# Patient Record
Sex: MALE | Race: WHITE | Employment: FULL TIME | URBAN - METROPOLITAN AREA
[De-identification: names, ages, dates, MRNs, and addresses within clinical notes are randomized per-mention and may not be internally consistent; named-entity substitution may affect disease eponyms.]

---

## 2019-05-06 ENCOUNTER — OFFICE VISIT (OUTPATIENT)
Dept: FAMILY MEDICINE CLINIC | Age: 59
End: 2019-05-06
Payer: COMMERCIAL

## 2019-05-06 VITALS
SYSTOLIC BLOOD PRESSURE: 152 MMHG | BODY MASS INDEX: 37.37 KG/M2 | OXYGEN SATURATION: 94 % | HEIGHT: 73 IN | DIASTOLIC BLOOD PRESSURE: 98 MMHG | HEART RATE: 74 BPM | RESPIRATION RATE: 16 BRPM | TEMPERATURE: 98 F | WEIGHT: 282 LBS

## 2019-05-06 DIAGNOSIS — M70.22 OLECRANON BURSITIS OF LEFT ELBOW: Primary | ICD-10-CM

## 2019-05-06 PROCEDURE — 96372 THER/PROPH/DIAG INJ SC/IM: CPT | Performed by: NURSE PRACTITIONER

## 2019-05-06 PROCEDURE — 99202 OFFICE O/P NEW SF 15 MIN: CPT | Performed by: NURSE PRACTITIONER

## 2019-05-06 RX ORDER — TRIAMCINOLONE ACETONIDE 40 MG/ML
40 INJECTION, SUSPENSION INTRA-ARTICULAR; INTRAMUSCULAR ONCE
Status: COMPLETED | OUTPATIENT
Start: 2019-05-06 | End: 2019-05-06

## 2019-05-06 RX ORDER — PREDNISONE 20 MG/1
40 TABLET ORAL DAILY
Qty: 10 TABLET | Refills: 0 | Status: SHIPPED | OUTPATIENT
Start: 2019-05-06 | End: 2019-05-11

## 2019-05-06 RX ORDER — LISINOPRIL 10 MG/1
TABLET ORAL
COMMUNITY
Start: 2017-02-08 | End: 2021-04-20 | Stop reason: SDUPTHER

## 2019-05-06 RX ORDER — ATORVASTATIN CALCIUM 20 MG/1
TABLET, FILM COATED ORAL
Status: ON HOLD | COMMUNITY
Start: 2017-02-08 | End: 2021-04-20 | Stop reason: HOSPADM

## 2019-05-06 RX ADMIN — TRIAMCINOLONE ACETONIDE 40 MG: 40 INJECTION, SUSPENSION INTRA-ARTICULAR; INTRAMUSCULAR at 11:05

## 2019-05-06 NOTE — PROGRESS NOTES
After obtaining consent, and per orders of [facility] CNP, injection of kenalog 40 given in Left upper quad. gluteus by Ilya Shore. Patient instructed to remain in clinic for 20 minutes afterwards, and to report any adverse reaction to me immediately.

## 2019-05-06 NOTE — PATIENT INSTRUCTIONS
Patient Education        Bursitis: Care Instructions  Your Care Instructions    A bursa is a small sac of fluid that helps the tissues around a joint slide over one another easily. Injury or overuse of a joint can cause pain, redness, and inflammation in the bursa (bursitis). Bursitis usually gets better if you avoid the activity that caused it. You can help prevent bursitis from coming back by doing stretching and strengthening exercises. You may also need to change the way you do some activities. Follow-up care is a key part of your treatment and safety. Be sure to make and go to all appointments, and call your doctor if you are having problems. It's also a good idea to know your test results and keep a list of the medicines you take. How can you care for yourself at home? · Put ice or a cold pack on the area for 10 to 20 minutes at a time. Try to do this every 1 to 2 hours for the next 3 days (when you are awake) or until the swelling goes down. Put a thin cloth between the ice and your skin. · After the 3 days of using ice, you may use heat on the area. You can use a hot water bottle; a warm, moist towel; or a heating pad set on low. You can also try alternating heat and ice. · Rest the area where you have pain. Stop any activities that cause pain. Switch to activities that do not stress the area. · Take pain medicines exactly as directed. ? If the doctor gave you a prescription medicine for pain, take it as prescribed. ? If you are not taking a prescription pain medicine, ask your doctor if you can take an over-the-counter medicine. ? Do not take two or more pain medicines at the same time unless the doctor told you to. Many pain medicines have acetaminophen, which is Tylenol. Too much acetaminophen (Tylenol) can be harmful. · To prevent stiffness, gently move the joint as much as you can without pain every day. As the pain gets better, keep doing range-of-motion exercises.  Ask your doctor for exercises that will make the muscles around the joint stronger. Do these as directed. · You can slowly return to the activity that caused the pain, but do it with less effort until you can do it without pain or swelling. Be sure to warm up before and stretch after you do the activity. When should you call for help? Call your doctor now or seek immediate medical care if:    · You have new or worse symptoms of infection, such as:  ? Increased pain, swelling, warmth, or redness. ? Red streaks leading from the area. ? Pus draining from the area. ? A fever.    Watch closely for changes in your health, and be sure to contact your doctor if:    · You do not get better as expected. Where can you learn more? Go to https://VT Silicon.[employer]. org and sign in to your AdultSpace account. Enter P613 in the Yesware box to learn more about \"Bursitis: Care Instructions. \"     If you do not have an account, please click on the \"Sign Up Now\" link. Current as of: September 20, 2018  Content Version: 11.9  © 4563-5379 [employer]. Care instructions adapted under license by [facility] ([facility]). If you have questions about a medical condition or this instruction, always ask your healthcare professional. [address] any warranty or liability for your use of this information. Patient Education        Bursitis of the Elbow: Care Instructions  Your Care Instructions  Bursitis is pain and swelling of the bursae. These are sacs of fluid that help your joints move smoothly. Olecranon bursitis is a type of bursitis that affects the back of the elbow. This is sometimes called Nicanor elbow because the bump that develops looks like the cartoon character Nicanor's elbow. Injury, overuse, or prolonged pressure on your elbow can cause this form of bursitis. Sometimes it happens when people have arthritis. It also can occur for unknown reasons.   Treatment may include draining fluid from the bursa with a needle. If your doctor thought there was infection, he or she may have prescribed antibiotics. You also may get shots of medicine into the bursa to help the swelling go down. Your elbow should get better in a few days or weeks. Follow-up care is a key part of your treatment and safety. Be sure to make and go to all appointments, and call your doctor if you are having problems. It's also a good idea to know your test results and keep a list of the medicines you take. How can you care for yourself at home? · Take pain medicines exactly as directed. ? If the doctor gave you a prescription medicine for pain, take it as prescribed. ? If you are not taking a prescription pain medicine, ask your doctor if you can take an over-the-counter medicine. ? Do not take two or more pain medicines at the same time unless the doctor told you to. Many pain medicines have acetaminophen, which is Tylenol. Too much acetaminophen (Tylenol) can be harmful. · If your doctor prescribed antibiotics, take them as directed. Do not stop taking them just because you feel better. You need to take the full course of antibiotics. · If your doctor gave you a sling, an elastic bandage, or a compression sleeve, wear it exactly as instructed. · Put ice or a cold pack on your elbow for 10 to 20 minutes at a time. Try to do this every 1 to 2 hours for the next 3 days (when you are awake) or until the swelling goes down. Put a thin cloth between the ice and your skin. · After 3 days, you can try heat, or alternate heat and ice. · Rest your elbow. Try to stop or reduce any activity that causes pain. · Wear elbow pads during physical activity to prevent injury. · Do not lean your elbows on tables or armrests. When should you call for help? Call your doctor now or seek immediate medical care if:    · You have new or worse symptoms of infection, such as:  ? Increased pain, swelling, warmth, or redness.   ? Red streaks leading from the area. ? Pus draining from the area. ? A fever.    Watch closely for changes in your health, and be sure to contact your doctor if:    · You do not get better as expected. Where can you learn more? Go to https://chpepapitoeb.Rivalfox. org and sign in to your FÃƒÂ©vrier 46hart account. Enter  in the [facility] box to learn more about \"Bursitis of the Elbow: Care Instructions. \"     If you do not have an account, please click on the \"Sign Up Now\" link. Current as of: September 20, 2018  Content Version: 11.9  © 1318-3977 [employer]. Care instructions adapted under license by [facility] ([facility]). If you have questions about a medical condition or this instruction, always ask your healthcare professional. [address] any warranty or liability for your use of this information.

## 2019-05-06 NOTE — PROGRESS NOTES
[address] [facility]   [address] [facility]  [address] [facility] [address]  Dept: [phone]  Dept Fax: [phone]    Tadeo Martin is a 59 y.o. male who presents today forhis medical conditions/complaints as noted below. Tadeo Martin is c/o of   Chief Complaint   Patient presents with    Joint Swelling     Woke up Saturday AM with L elbow swelling/mass. Very painful and lack of ROM. Took tylenol and used ice and swelling went down. This AM woke up and was swollen again. Hit elbow 5 days ago but did not relate symptoms per patient.  Hypertension     BP elevated today. Pt states did not take BP meds this AM.      HPI:     Arm Pain    The incident occurred 3 to 5 days ago (L elbow x's 4 days ). The incident occurred at home. The injury mechanism was a direct blow (Hit elbow on file cabinet and developed swelling x's 5 days later ). The pain is present in the left elbow. The quality of the pain is described as shooting. The pain does not radiate. The pain is at a severity of 8/10. The pain is mild. The pain has been fluctuating since the incident. Pertinent negatives include no chest pain, muscle weakness, numbness or tingling. The symptoms are aggravated by movement, palpation and lifting. He has tried acetaminophen and ice for the symptoms. The treatment provided mild relief. Past Medical History:   Diagnosis Date    CAD (coronary artery disease)     Hyperlipidemia     Hypertension       Past Surgical History:   Procedure Laterality Date    APPENDECTOMY      CARDIAC SURGERY      4 stents placed in 2002    TONSILLECTOMY      as a child       History reviewed. No pertinent family history.     Social History     Tobacco Use    Smoking status: Never Smoker    Smokeless tobacco: Never Used   Substance Use Topics    Alcohol use: Not Currently      Current Outpatient Medications   Medication Sig Dispense Refill    atorvastatin (LIPITOR) 20 MG tablet TK 1 T PO QD      lisinopril (PRINIVIL;ZESTRIL) 10 MG tablet TK 1 T PO DAILY      metoprolol tartrate (LOPRESSOR) 25 MG tablet TK ONE-HALF T BID PO      predniSONE (DELTASONE) 20 MG tablet Take 2 tablets by mouth daily for 5 days 10 tablet 0     No current facility-administered medications for this visit. No Known Allergies        Subjective:      Review of Systems   Constitutional: Negative for appetite change, chills, fatigue and fever. HENT: Negative for congestion, postnasal drip and sore throat. Eyes: Negative. Negative for discharge and itching. Respiratory: Negative for cough, chest tightness and shortness of breath. Cardiovascular: Negative for chest pain, palpitations and leg swelling. Gastrointestinal: Negative for abdominal pain, diarrhea, nausea and vomiting. Endocrine: Negative. Genitourinary: Negative for dysuria, frequency and urgency. Musculoskeletal: Positive for arthralgias and joint swelling. Negative for neck pain and neck stiffness. L elbow    Skin: Negative for rash. Allergic/Immunologic: Negative. Neurological: Negative for dizziness, tingling, weakness, light-headedness, numbness and headaches. Hematological: Negative for adenopathy. Psychiatric/Behavioral: Negative for confusion. Objective:      Physical Exam   Constitutional: He is oriented to person, place, and time. Vital signs are normal. He appears well-developed and well-nourished. HENT:   Head: Normocephalic. Right Ear: External ear normal.   Left Ear: External ear normal.   Nose: Nose normal.   Mouth/Throat: Oropharynx is clear and moist.   Eyes: Pupils are equal, round, and reactive to light. Conjunctivae and EOM are normal.   Neck: Normal range of motion. Neck supple. Cardiovascular: Normal rate, regular rhythm and normal heart sounds. Exam reveals no gallop and no friction rub. No murmur heard. Pulmonary/Chest: Effort normal and breath sounds normal. No respiratory distress. Abdominal: Soft. Bowel sounds are normal.   Musculoskeletal: Normal range of motion. Left forearm: He exhibits tenderness and swelling. Moderate swelling noted to L elbow. Tenderness with palpation    Lymphadenopathy:     He has no cervical adenopathy. Neurological: He is alert and oriented to person, place, and time. He has normal reflexes. No cranial nerve deficit. Coordination normal.   Skin: Skin is warm and dry. No rash noted. Psychiatric: He has a normal mood and affect. Thought content normal.     BP (!) 152/98 (Site: Right Upper Arm, Position: Sitting, Cuff Size: Large Adult)   Pulse 74   Temp 98 °F (36.7 °C) (Tympanic)   Resp 16   Ht 6' 1\" (1.854 m)   Wt 282 lb (127.9 kg)   SpO2 94%   BMI 37.21 kg/m²     Assessment:       Diagnosis Orders   1. Olecranon bursitis of left elbow  triamcinolone acetonide (KENALOG-40) injection 40 mg    predniSONE (DELTASONE) 20 MG tablet             Plan:     1.) In office Kenalog IM injection   2.) Start Prednisone   3.) May use Ibuprofen and/or Tylenol PRN pain and swelling   4.) Compression PRN for comfort and swelling   5.) Apply a compressive ACE bandage. Rest and elevate the affected painful area. Apply cold compresses intermittently as needed. As pain recedes, begin normal activities slowly as tolerated. Call if symptoms persist.   6.) Follow-up with PCP     Problem List     None           Patient given educationalmaterials - see patient instructions. Discussed use, benefit, and side effectsof prescribed medications. All patient questions answered. Pt verbalized understanding. Instructed to continue current medications, diet and exercise. Patient agreedwith treatment plan. Follow up as directed.      Electronically signed by NICKI Lyn CNP on 5/8/2019 at 3:52 PM

## 2019-05-08 ASSESSMENT — ENCOUNTER SYMPTOMS
SHORTNESS OF BREATH: 0
COUGH: 0
DIARRHEA: 0
EYES NEGATIVE: 1
EYE ITCHING: 0
ALLERGIC/IMMUNOLOGIC NEGATIVE: 1
NAUSEA: 0
EYE DISCHARGE: 0
CHEST TIGHTNESS: 0
SORE THROAT: 0
VOMITING: 0
ABDOMINAL PAIN: 0

## 2020-11-09 ENCOUNTER — OFFICE VISIT (OUTPATIENT)
Dept: PRIMARY CARE CLINIC | Age: 60
End: 2020-11-09
Payer: COMMERCIAL

## 2020-11-09 ENCOUNTER — HOSPITAL ENCOUNTER (OUTPATIENT)
Age: 60
Setting detail: SPECIMEN
Discharge: HOME | End: 2020-11-09
Payer: COMMERCIAL

## 2020-11-09 VITALS — OXYGEN SATURATION: 93 % | HEART RATE: 87 BPM | TEMPERATURE: 98.6 F

## 2020-11-09 PROCEDURE — 99213 OFFICE O/P EST LOW 20 MIN: CPT | Performed by: NURSE PRACTITIONER

## 2020-11-09 ASSESSMENT — PATIENT HEALTH QUESTIONNAIRE - PHQ9
2. FEELING DOWN, DEPRESSED OR HOPELESS: 0
1. LITTLE INTEREST OR PLEASURE IN DOING THINGS: 0
SUM OF ALL RESPONSES TO PHQ9 QUESTIONS 1 & 2: 0
SUM OF ALL RESPONSES TO PHQ QUESTIONS 1-9: 0

## 2020-11-09 NOTE — PATIENT INSTRUCTIONS
You were tested for COVID today. We will call you with results when they are available. If you have not heard from us in 7 days, please call our office. Patient was evaluated carside today during this pandemic covid 19 situation. Quarantine as directed  Await results  Increase fluids- stay hydrated  Good handwashing  Supportive care as discussed    If having symptoms- you need to be fever free for 24 hours, other symptoms resolved and at least 10 days passed since first symptom appeared before discontinuing  quarantine- CDC guidelines    tylenol as directed as needed over the counter if able to take  go to the ER for chest pain, short of breath, hard time breathing, persistent vomiting, feeling weaker or dehydrated, any worsening, change or concern  Follow up with primary care for re evaluation  [place] covid 19 call center - [phone]-ASK- [address]  Another good resource for information is coronavirus. [place].gov    If exposure, it is recommended 14 day quarantine

## 2020-11-10 ASSESSMENT — ENCOUNTER SYMPTOMS
VOMITING: 0
WHEEZING: 0
DIARRHEA: 0
COUGH: 1
SHORTNESS OF BREATH: 0

## 2020-11-10 NOTE — PROGRESS NOTES
[address]  [address] [id number]  Dept: [phone]  Dept Fax: [phone]    Cl Gage a 56 y.o. male who presents to the urgent care today for his medical conditions/complaintsas noted below. Luis Fernando Gonzalez is c/o of Concern For COVID-19 (Cough, body aches, chest tightness x 3 days )      HPI:     Cc cough, aches, chest tightness for 3 days  Denies vomiting, diarrhea    Cough   This is a new problem. Episode onset: 3 days. The problem has been waxing and waning. The cough is non-productive. Associated symptoms include myalgias and postnasal drip. Pertinent negatives include no chest pain, fever, shortness of breath or wheezing. Nothing aggravates the symptoms. Past Medical History:   Diagnosis Date    CAD (coronary artery disease)     Hyperlipidemia     Hypertension        Current Outpatient Medications   Medication Sig Dispense Refill    atorvastatin (LIPITOR) 20 MG tablet TK 1 T PO QD      lisinopril (PRINIVIL;ZESTRIL) 10 MG tablet TK 1 T PO DAILY      metoprolol tartrate (LOPRESSOR) 25 MG tablet TK ONE-HALF T BID PO       No current facility-administered medications for this visit. No Known Allergies    Subjective:     Review of Systems   Constitutional: Negative for fever. HENT: Positive for postnasal drip. Respiratory: Positive for cough. Negative for shortness of breath and wheezing. Cardiovascular: Negative for chest pain. Gastrointestinal: Negative for diarrhea and vomiting. Musculoskeletal: Positive for myalgias. All other systems reviewed and are negative. Objective:      Physical Exam  Vitals signs and nursing note reviewed. Constitutional:       General: He is not in acute distress. Appearance: Normal appearance. He is well-developed. He is not ill-appearing, toxic-appearing or diaphoretic. HENT:      Head: Normocephalic and atraumatic.       Nose: Nose normal.      Mouth/Throat: Mouth: Mucous membranes are moist.   Eyes:      General: No scleral icterus. Right eye: No discharge. Left eye: No discharge. Neck:      Musculoskeletal: Normal range of motion and neck supple. No neck rigidity. Cardiovascular:      Rate and Rhythm: Normal rate and regular rhythm. Heart sounds: Normal heart sounds. Pulmonary:      Effort: Pulmonary effort is normal. No respiratory distress. Breath sounds: Normal breath sounds. No stridor. No wheezing, rhonchi or rales. Abdominal:      General: Bowel sounds are normal.      Palpations: Abdomen is soft. Musculoskeletal: Normal range of motion. General: No signs of injury. Skin:     General: Skin is warm and dry. Coloration: Skin is not jaundiced or pale. Findings: No erythema or rash. Neurological:      General: No focal deficit present. Mental Status: He is alert and oriented to person, place, and time. Psychiatric:         Mood and Affect: Mood normal.         Behavior: Behavior normal.       Pulse 87   Temp 98.6 °F (37 °C) (Tympanic)   SpO2 93%     Assessment:          Diagnosis Orders   1. Upper respiratory tract infection, unspecified type  COVID-19 Ambulatory       Plan: You were tested for COVID today. We will call you with results when they are available. If you have not heard from us in 7 days, please call our office. Patient was evaluated carside today during this pandemic covid 19 situation.     Quarantine as directed  Await results  Increase fluids- stay hydrated  Good handwashing  Supportive care as discussed    If having symptoms- you need to be fever free for 24 hours, other symptoms resolved and at least 10 days passed since first symptom appeared before discontinuing  quarantine- CDC guidelines    tylenol as directed as needed over the counter if able to take  go to the ER for chest pain, short of breath, hard time breathing, persistent vomiting, feeling weaker or dehydrated, any worsening, change or concern  Follow up with primary care for re evaluation  [place] covid 19 call center - [phone]-ASK- [facility] (1-)  Another good resource for information is coronavirus. [place].gov    If exposure, it is recommended 14 day quarantine  Return for follow up with primary care in 7 days, worsening, change or concern. Patient given educational materials - see patientinstructions. Discussed use, benefit, and side effects of prescribed medications. All patient questions answered. Pt voiced understanding.     Electronically signed by NICKI Dillon 11/10/2020 at 11:04 AM

## 2020-11-11 LAB — SARS-COV-2, NAA: NOT DETECTED

## 2021-04-19 ENCOUNTER — APPOINTMENT (OUTPATIENT)
Dept: CT IMAGING | Age: 61
DRG: 069 | End: 2021-04-19
Payer: COMMERCIAL

## 2021-04-19 ENCOUNTER — APPOINTMENT (OUTPATIENT)
Dept: GENERAL RADIOLOGY | Age: 61
DRG: 069 | End: 2021-04-19
Payer: COMMERCIAL

## 2021-04-19 ENCOUNTER — HOSPITAL ENCOUNTER (INPATIENT)
Age: 61
LOS: 1 days | Discharge: HOME | DRG: 069 | End: 2021-04-20
Attending: EMERGENCY MEDICINE | Admitting: PSYCHIATRY & NEUROLOGY
Payer: COMMERCIAL

## 2021-04-19 DIAGNOSIS — I63.9 CEREBROVASCULAR ACCIDENT (CVA), UNSPECIFIED MECHANISM (HCC): Primary | ICD-10-CM

## 2021-04-19 LAB
% CKMB: 2.3 % (ref 0–3.5)
ABSOLUTE EOS #: 0.14 K/UL (ref 0–0.44)
ABSOLUTE IMMATURE GRANULOCYTE: 0.05 K/UL (ref 0–0.3)
ABSOLUTE LYMPH #: 3.22 K/UL (ref 1.1–3.7)
ABSOLUTE MONO #: 0.52 K/UL (ref 0.1–1.2)
ACETAMINOPHEN LEVEL: <5 UG/ML (ref 10–30)
ACETAMINOPHEN LEVEL: NORMAL UG/ML
ALLEN TEST: NORMAL
AMPHETAMINE: NORMAL
ANION GAP SERPL CALCULATED.3IONS-SCNC: 12 MMOL/L (ref 9–17)
ANION GAP: 8 MMOL/L (ref 7–16)
BARBITURATES: NORMAL
BASOPHILS # BLD: 1 % (ref 0–2)
BASOPHILS ABSOLUTE: 0.05 K/UL (ref 0–0.2)
BENZODIAZEPINES: NORMAL
BUN BLDV-MCNC: 15 MG/DL (ref 8–23)
BUN/CREAT BLD: ABNORMAL (ref 9–20)
BUPRENORPHINE: NORMAL
CALCIUM SERPL-MCNC: 9.7 MG/DL (ref 8.6–10.4)
CARBOXYHEMOGLOBIN: 2.1 % (ref 0–5)
CHLORIDE BLD-SCNC: 103 MMOL/L (ref 98–107)
CK MB: 3.5 NG/ML
CKMB INTERPRETATION: ABNORMAL
CO2: 24 MMOL/L (ref 20–31)
COCAINE: NORMAL
CREAT SERPL-MCNC: 0.76 MG/DL (ref 0.7–1.2)
DIFFERENTIAL TYPE: ABNORMAL
DRUGS OF ABUSE COMMENT: NORMAL
EOSINOPHILS RELATIVE PERCENT: 2 % (ref 1–4)
ETHANOL PERCENT: <0.01 %
ETHANOL PERCENT: NORMAL %
ETHANOL: <10 MG/DL
ETHANOL: NORMAL MG/DL
FIO2: NORMAL
GFR AFRICAN AMERICAN: >60 ML/MIN
GFR NON-AFRICAN AMERICAN: >60 ML/MIN
GFR NON-AFRICAN AMERICAN: >60 ML/MIN
GFR SERPL CREATININE-BSD FRML MDRD: >60 ML/MIN
GFR SERPL CREATININE-BSD FRML MDRD: ABNORMAL ML/MIN/{1.73_M2}
GFR SERPL CREATININE-BSD FRML MDRD: ABNORMAL ML/MIN/{1.73_M2}
GFR SERPL CREATININE-BSD FRML MDRD: NORMAL ML/MIN/{1.73_M2}
GLUCOSE BLD-MCNC: 113 MG/DL (ref 70–99)
GLUCOSE BLD-MCNC: 115 MG/DL (ref 74–100)
HCO3 VENOUS: 28.9 MMOL/L (ref 22–29)
HCT VFR BLD CALC: 50.7 % (ref 40.7–50.3)
HEMOGLOBIN: 16.7 G/DL (ref 13–17)
IMMATURE GRANULOCYTES: 1 %
INR BLD: 1.1
LYMPHOCYTES # BLD: 40 % (ref 24–43)
MCH RBC QN AUTO: 27.9 PG (ref 25.2–33.5)
MCHC RBC AUTO-ENTMCNC: 32.9 G/DL (ref 28.4–34.8)
MCV RBC AUTO: 84.6 FL (ref 82.6–102.9)
METHADONE: NORMAL
METHAMPHETAMINE: NORMAL
MODE: NORMAL
MONOCYTES # BLD: 6 % (ref 3–12)
MYOGLOBIN: 61 NG/ML (ref 28–72)
NEGATIVE BASE EXCESS, VEN: NORMAL (ref 0–2)
NRBC AUTOMATED: 0 PER 100 WBC
O2 DEVICE/FLOW/%: NORMAL
O2 SAT, VEN: 62 % (ref 60–85)
OPIATES: NORMAL
OXYCODONE: NORMAL
PARTIAL THROMBOPLASTIN TIME: 26.1 SEC (ref 20.5–30.5)
PATIENT TEMP: NORMAL
PCO2, VEN: 45.9 MM HG (ref 41–51)
PDW BLD-RTO: 13.2 % (ref 11.8–14.4)
PH VENOUS: 7.41 (ref 7.32–7.43)
PHENCYCLIDINE: NORMAL
PLATELET # BLD: 232 K/UL (ref 138–453)
PLATELET ESTIMATE: ABNORMAL
PMV BLD AUTO: 10.1 FL (ref 8.1–13.5)
PO2, VEN: 32.1 MM HG (ref 30–50)
POC CHLORIDE: 105 MMOL/L (ref 98–107)
POC CREATININE: 1.11 MG/DL (ref 0.51–1.19)
POC HEMATOCRIT: 53 % (ref 41–53)
POC HEMOGLOBIN: 17.9 G/DL (ref 13.5–17.5)
POC IONIZED CALCIUM: 1.12 MMOL/L (ref 1.15–1.33)
POC LACTIC ACID: 1.51 MMOL/L (ref 0.56–1.39)
POC PCO2 TEMP: NORMAL MM HG
POC PH TEMP: NORMAL
POC PO2 TEMP: NORMAL MM HG
POC POTASSIUM: 3.8 MMOL/L (ref 3.5–4.5)
POC SODIUM: 142 MMOL/L (ref 138–146)
POSITIVE BASE EXCESS, VEN: 3 (ref 0–3)
POTASSIUM SERPL-SCNC: 3.9 MMOL/L (ref 3.7–5.3)
PROTHROMBIN TIME: 11.4 SEC (ref 9.1–12.3)
RBC # BLD: 5.99 M/UL (ref 4.21–5.77)
RBC # BLD: ABNORMAL 10*6/UL
SALICYLATE LEVEL: <1 MG/DL (ref 3–10)
SALICYLATE LEVEL: NORMAL MG/DL
SAMPLE SITE: NORMAL
SEG NEUTROPHILS: 50 % (ref 36–65)
SEGMENTED NEUTROPHILS ABSOLUTE COUNT: 4.09 K/UL (ref 1.5–8.1)
SODIUM BLD-SCNC: 139 MMOL/L (ref 135–144)
THC: NORMAL
TOTAL CK: 149 U/L (ref 39–308)
TOTAL CO2, VENOUS: 30 MMOL/L (ref 23–30)
TOXIC TRICYCLIC SC,BLOOD: NEGATIVE
TOXIC TRICYCLIC SC,BLOOD: NORMAL
TROPONIN INTERP: ABNORMAL
TROPONIN T: ABNORMAL NG/ML
TROPONIN, HIGH SENSITIVITY: 8 NG/L (ref 0–22)
TSH SERPL DL<=0.05 MIU/L-ACNC: 1.35 MIU/L (ref 0.3–5)
WBC # BLD: 8.1 K/UL (ref 3.5–11.3)
WBC # BLD: ABNORMAL 10*3/UL

## 2021-04-19 PROCEDURE — 83874 ASSAY OF MYOGLOBIN: CPT

## 2021-04-19 PROCEDURE — 70496 CT ANGIOGRAPHY HEAD: CPT

## 2021-04-19 PROCEDURE — 71045 X-RAY EXAM CHEST 1 VIEW: CPT

## 2021-04-19 PROCEDURE — 82803 BLOOD GASES ANY COMBINATION: CPT

## 2021-04-19 PROCEDURE — 6360000004 HC RX CONTRAST MEDICATION: Performed by: STUDENT IN AN ORGANIZED HEALTH CARE EDUCATION/TRAINING PROGRAM

## 2021-04-19 PROCEDURE — C9248 INJ, CLEVIDIPINE BUTYRATE: HCPCS | Performed by: STUDENT IN AN ORGANIZED HEALTH CARE EDUCATION/TRAINING PROGRAM

## 2021-04-19 PROCEDURE — 85025 COMPLETE CBC W/AUTO DIFF WBC: CPT

## 2021-04-19 PROCEDURE — 99222 1ST HOSP IP/OBS MODERATE 55: CPT | Performed by: PSYCHIATRY & NEUROLOGY

## 2021-04-19 PROCEDURE — 99221 1ST HOSP IP/OBS SF/LOW 40: CPT | Performed by: INTERNAL MEDICINE

## 2021-04-19 PROCEDURE — 82435 ASSAY OF BLOOD CHLORIDE: CPT

## 2021-04-19 PROCEDURE — 6360000002 HC RX W HCPCS: Performed by: STUDENT IN AN ORGANIZED HEALTH CARE EDUCATION/TRAINING PROGRAM

## 2021-04-19 PROCEDURE — 82553 CREATINE MB FRACTION: CPT

## 2021-04-19 PROCEDURE — 93005 ELECTROCARDIOGRAM TRACING: CPT | Performed by: STUDENT IN AN ORGANIZED HEALTH CARE EDUCATION/TRAINING PROGRAM

## 2021-04-19 PROCEDURE — 83605 ASSAY OF LACTIC ACID: CPT

## 2021-04-19 PROCEDURE — 84132 ASSAY OF SERUM POTASSIUM: CPT

## 2021-04-19 PROCEDURE — 85610 PROTHROMBIN TIME: CPT

## 2021-04-19 PROCEDURE — 84443 ASSAY THYROID STIM HORMONE: CPT

## 2021-04-19 PROCEDURE — 85730 THROMBOPLASTIN TIME PARTIAL: CPT

## 2021-04-19 PROCEDURE — 82565 ASSAY OF CREATININE: CPT

## 2021-04-19 PROCEDURE — 70450 CT HEAD/BRAIN W/O DYE: CPT

## 2021-04-19 PROCEDURE — 84484 ASSAY OF TROPONIN QUANT: CPT

## 2021-04-19 PROCEDURE — 84295 ASSAY OF SERUM SODIUM: CPT

## 2021-04-19 PROCEDURE — 85014 HEMATOCRIT: CPT

## 2021-04-19 PROCEDURE — 99284 EMERGENCY DEPT VISIT MOD MDM: CPT

## 2021-04-19 PROCEDURE — 80307 DRUG TEST PRSMV CHEM ANLYZR: CPT

## 2021-04-19 PROCEDURE — BW28ZZZ COMPUTERIZED TOMOGRAPHY (CT SCAN) OF HEAD: ICD-10-PCS | Performed by: EMERGENCY MEDICINE

## 2021-04-19 PROCEDURE — 82947 ASSAY GLUCOSE BLOOD QUANT: CPT

## 2021-04-19 PROCEDURE — B030Y0Z MAGNETIC RESONANCE IMAGING (MRI) OF BRAIN USING OTHER CONTRAST, UNENHANCED AND ENHANCED: ICD-10-PCS | Performed by: EMERGENCY MEDICINE

## 2021-04-19 PROCEDURE — 82550 ASSAY OF CK (CPK): CPT

## 2021-04-19 PROCEDURE — 82375 ASSAY CARBOXYHB QUANT: CPT

## 2021-04-19 PROCEDURE — 2060000000 HC ICU INTERMEDIATE R&B

## 2021-04-19 PROCEDURE — 80179 DRUG ASSAY SALICYLATE: CPT

## 2021-04-19 PROCEDURE — G0480 DRUG TEST DEF 1-7 CLASSES: HCPCS

## 2021-04-19 PROCEDURE — 6370000000 HC RX 637 (ALT 250 FOR IP): Performed by: STUDENT IN AN ORGANIZED HEALTH CARE EDUCATION/TRAINING PROGRAM

## 2021-04-19 PROCEDURE — 80048 BASIC METABOLIC PNL TOTAL CA: CPT

## 2021-04-19 PROCEDURE — 80143 DRUG ASSAY ACETAMINOPHEN: CPT

## 2021-04-19 PROCEDURE — 82330 ASSAY OF CALCIUM: CPT

## 2021-04-19 RX ORDER — CLOPIDOGREL BISULFATE 75 MG/1
75 TABLET ORAL DAILY
Status: DISCONTINUED | OUTPATIENT
Start: 2021-04-20 | End: 2021-04-20 | Stop reason: HOSPADM

## 2021-04-19 RX ORDER — CLOPIDOGREL 300 MG/1
300 TABLET, FILM COATED ORAL ONCE
Status: COMPLETED | OUTPATIENT
Start: 2021-04-19 | End: 2021-04-19

## 2021-04-19 RX ORDER — DIPHENHYDRAMINE HYDROCHLORIDE 50 MG/ML
25 INJECTION INTRAMUSCULAR; INTRAVENOUS ONCE
Status: COMPLETED | OUTPATIENT
Start: 2021-04-19 | End: 2021-04-19

## 2021-04-19 RX ORDER — ONDANSETRON 2 MG/ML
4 INJECTION INTRAMUSCULAR; INTRAVENOUS EVERY 6 HOURS PRN
Status: DISCONTINUED | OUTPATIENT
Start: 2021-04-19 | End: 2021-04-20 | Stop reason: HOSPADM

## 2021-04-19 RX ORDER — DIPHENHYDRAMINE HYDROCHLORIDE 50 MG/ML
25 INJECTION INTRAMUSCULAR; INTRAVENOUS ONCE
Status: DISCONTINUED | OUTPATIENT
Start: 2021-04-19 | End: 2021-04-20 | Stop reason: HOSPADM

## 2021-04-19 RX ORDER — KETOROLAC TROMETHAMINE 30 MG/ML
30 INJECTION, SOLUTION INTRAMUSCULAR; INTRAVENOUS ONCE
Status: COMPLETED | OUTPATIENT
Start: 2021-04-19 | End: 2021-04-19

## 2021-04-19 RX ORDER — PROMETHAZINE HYDROCHLORIDE 12.5 MG/1
12.5 TABLET ORAL EVERY 6 HOURS PRN
Status: DISCONTINUED | OUTPATIENT
Start: 2021-04-19 | End: 2021-04-20 | Stop reason: HOSPADM

## 2021-04-19 RX ORDER — SODIUM CHLORIDE 0.9 % (FLUSH) 0.9 %
5-40 SYRINGE (ML) INJECTION PRN
Status: DISCONTINUED | OUTPATIENT
Start: 2021-04-19 | End: 2021-04-20 | Stop reason: HOSPADM

## 2021-04-19 RX ORDER — PROCHLORPERAZINE EDISYLATE 5 MG/ML
10 INJECTION INTRAMUSCULAR; INTRAVENOUS ONCE
Status: COMPLETED | OUTPATIENT
Start: 2021-04-19 | End: 2021-04-19

## 2021-04-19 RX ORDER — SODIUM CHLORIDE 0.9 % (FLUSH) 0.9 %
5-40 SYRINGE (ML) INJECTION EVERY 12 HOURS SCHEDULED
Status: DISCONTINUED | OUTPATIENT
Start: 2021-04-19 | End: 2021-04-20 | Stop reason: HOSPADM

## 2021-04-19 RX ORDER — SODIUM CHLORIDE 9 MG/ML
25 INJECTION, SOLUTION INTRAVENOUS PRN
Status: DISCONTINUED | OUTPATIENT
Start: 2021-04-19 | End: 2021-04-20 | Stop reason: HOSPADM

## 2021-04-19 RX ORDER — MAGNESIUM SULFATE IN WATER 40 MG/ML
2000 INJECTION, SOLUTION INTRAVENOUS ONCE
Status: COMPLETED | OUTPATIENT
Start: 2021-04-19 | End: 2021-04-19

## 2021-04-19 RX ORDER — ASPIRIN 81 MG/1
81 TABLET ORAL DAILY
Status: DISCONTINUED | OUTPATIENT
Start: 2021-04-20 | End: 2021-04-20 | Stop reason: HOSPADM

## 2021-04-19 RX ORDER — ASPIRIN 300 MG/1
300 SUPPOSITORY RECTAL DAILY
Status: DISCONTINUED | OUTPATIENT
Start: 2021-04-20 | End: 2021-04-20 | Stop reason: HOSPADM

## 2021-04-19 RX ORDER — ASPIRIN 325 MG
325 TABLET ORAL ONCE
Status: COMPLETED | OUTPATIENT
Start: 2021-04-19 | End: 2021-04-19

## 2021-04-19 RX ORDER — POLYETHYLENE GLYCOL 3350 17 G/17G
17 POWDER, FOR SOLUTION ORAL DAILY PRN
Status: DISCONTINUED | OUTPATIENT
Start: 2021-04-19 | End: 2021-04-20 | Stop reason: HOSPADM

## 2021-04-19 RX ADMIN — ASPIRIN 325 MG: 325 TABLET ORAL at 15:15

## 2021-04-19 RX ADMIN — MAGNESIUM SULFATE 2000 MG: 2 INJECTION INTRAVENOUS at 17:55

## 2021-04-19 RX ADMIN — CLOPIDOGREL BISULFATE 300 MG: 300 TABLET, FILM COATED ORAL at 15:15

## 2021-04-19 RX ADMIN — IOPAMIDOL 75 ML: 755 INJECTION, SOLUTION INTRAVENOUS at 14:21

## 2021-04-19 RX ADMIN — DIPHENHYDRAMINE HYDROCHLORIDE 25 MG: 50 INJECTION, SOLUTION INTRAMUSCULAR; INTRAVENOUS at 16:02

## 2021-04-19 RX ADMIN — PROCHLORPERAZINE EDISYLATE 10 MG: 5 INJECTION INTRAMUSCULAR; INTRAVENOUS at 16:02

## 2021-04-19 RX ADMIN — KETOROLAC TROMETHAMINE 30 MG: 30 INJECTION, SOLUTION INTRAMUSCULAR; INTRAVENOUS at 17:54

## 2021-04-19 RX ADMIN — CLEVIPIDINE 5 MG/HR: 0.5 EMULSION INTRAVENOUS at 14:59

## 2021-04-19 ASSESSMENT — ENCOUNTER SYMPTOMS
SORE THROAT: 0
ABDOMINAL PAIN: 0
SHORTNESS OF BREATH: 0
VOMITING: 0
EYE PAIN: 0
COUGH: 0
BACK PAIN: 0
SINUS PAIN: 0
DIARRHEA: 0
NAUSEA: 0

## 2021-04-19 ASSESSMENT — PAIN SCALES - GENERAL: PAINLEVEL_OUTOF10: 7

## 2021-04-19 NOTE — H&P
[id number] [facility] Neurology   [address] [place] [address]    HISTORY AND PHYSICAL EXAMINATION            Date:   4/19/2021  Patient name:  Kash Valenzuela  Date of admission:  4/19/2021  1:35 PM  MRN:   [id number]  Account:  [de-identified]  YOB: 1960  PCP:    No primary care provider on file. Room:   [facility]  Code Status:    No Order    Chief Complaint:     Chief Complaint   Patient presents with    Altered Mental Status       History Obtained From:     patient    History of Present Illness: The patient is a 56 y.o. Non-/non  male who presents with Altered Mental Status   and he is admitted to the hospital for the management of stroke    Kash Valenzuela is a 56 y.o. male who presents with a history of hypertension, hyperlipidemia coronary artery disease scented to the ED with last well-known 1 PM when he was at work and he noticed he could not get the words out and was confused. Given our to return back to his baseline and he decided to come to the ED for evaluation. The patient SBP is to be in 190s, back to his baseline with no neurological deficit. He is to take Lopressor 25 mg and lisinopril 10 mg but has been noncompliant with it he is not on any kind of anticoagulation or antiplatelet at home.        Date last known well: 4/19/2021  Time last known well: 1300  NIHSS:0  Blood Glucose: 113   Blood Pressure:  188/132     At home on AC/AP: No           Past Medical History:     Past Medical History:   Diagnosis Date    CAD (coronary artery disease)     Hyperlipidemia     Hypertension         Past Surgical History:     Past Surgical History:   Procedure Laterality Date    APPENDECTOMY      CARDIAC SURGERY      4 stents placed in 2002    TONSILLECTOMY      as a child        Medications Prior to Admission:     Prior to Admission medications    Medication Sig Start Date End Date Taking?  Authorizing Provider   atorvastatin (LIPITOR) 20 MG tablet TK 1 T PO QD 2/8/17 Historical Provider, MD   lisinopril (PRINIVIL;ZESTRIL) 10 MG tablet TK 1 T PO DAILY 17   Historical Provider, MD   metoprolol tartrate (LOPRESSOR) 25 MG tablet TK ONE-HALF T BID PO 17   Historical Provider, MD        Allergies:     Patient has no known allergies. Social History:     Tobacco:    reports that he has never smoked. He has never used smokeless tobacco.  Alcohol:      reports previous alcohol use. Drug Use:  has no history on file for drug. Family History:     History reviewed. No pertinent family history. Review of Systems:     ROS:  Constitutional  Negative for fever and chills    HEENT  Negative for ear discharge, ear pain, nosebleed    Eyes  Negative for photophobia, pain and discharge    Respiratory  Negative for hemoptysis and sputum    Cardiovascular  Negative for orthopnea, claudication and PND    Gastrointestinal  Negative for abdominal pain, diarrhea, blood in stool    Musculoskeletal  Negative for joint pain, negative for myalgia    Skin  Negative for rash or itching    Endo/heme/allergies  Negative for polydipsia, environmental allergy    Psychiatric/behavioral  Negative for suicidal ideation.  Patient is not anxious        Physical Exam:   BP (!) 223/154   Pulse 106   Temp 98.4 °F (36.9 °C) (Oral)   Resp 17   Ht 6' 1\" (1.854 m)   Wt 270 lb (122.5 kg)   SpO2 95%   BMI 35.62 kg/m²   Temp (24hrs), Av.4 °F (36.9 °C), Min:98.4 °F (36.9 °C), Max:98.4 °F (36.9 °C)    Recent Labs     21  1345   POCGLU 115*     No intake or output data in the 24 hours ending 21 1503            GENERAL  Appears comfortable and in no distress   HEENT  NC/ AT   HEART  S1 and S2 heard; palpation of pulses: radial pulse    NECK  Supple and no bruits heard   MENTAL STATUS:  Alert, oriented, intact memory, no confusion, normal speech, normal language, no hallucination or delusion   CRANIAL NERVES: II     -      Visual fields intact to confrontation  III,IV,VI -  PERR, EOMs full, no Creatinine W/GFR Point of Care    Collection Time: 04/19/21  1:45 PM   Result Value Ref Range    POC Creatinine 1.11 0.51 - 1.19 mg/dL    GFR Comment >60 >60 mL/min    GFR Non-African American >60 >60 mL/min    GFR Comment         SODIUM (POC)    Collection Time: 04/19/21  1:45 PM   Result Value Ref Range    POC Sodium 142 138 - 146 mmol/L   POTASSIUM (POC)    Collection Time: 04/19/21  1:45 PM   Result Value Ref Range    POC Potassium 3.8 3.5 - 4.5 mmol/L   CHLORIDE (POC)    Collection Time: 04/19/21  1:45 PM   Result Value Ref Range    POC Chloride 105 98 - 107 mmol/L   CALCIUM, IONIC (POC)    Collection Time: 04/19/21  1:45 PM   Result Value Ref Range    POC Ionized Calcium 1.12 (L) 1.15 - 1.33 mmol/L   Lactic Acid, POC    Collection Time: 04/19/21  1:45 PM   Result Value Ref Range    POC Lactic Acid 1.51 (H) 0.56 - 1.39 mmol/L   POCT Glucose    Collection Time: 04/19/21  1:45 PM   Result Value Ref Range    POC Glucose 115 (H) 74 - 100 mg/dL   Anion Gap (Calc) POC    Collection Time: 04/19/21  1:45 PM   Result Value Ref Range    Anion Gap 8 7 - 16 mmol/L   STROKE PANEL    Collection Time: 04/19/21  2:00 PM   Result Value Ref Range    Glucose 113 (H) 70 - 99 mg/dL    BUN 15 8 - 23 mg/dL    CREATININE 0.76 0.70 - 1.20 mg/dL    Bun/Cre Ratio NOT REPORTED 9 - 20    Calcium 9.7 8.6 - 10.4 mg/dL    Sodium 139 135 - 144 mmol/L    Potassium 3.9 3.7 - 5.3 mmol/L    Chloride 103 98 - 107 mmol/L    CO2 24 20 - 31 mmol/L    Anion Gap 12 9 - 17 mmol/L    GFR Non-African American >60 >60 mL/min    GFR African American >60 >60 mL/min    GFR Comment          GFR Staging NOT REPORTED     WBC 8.1 3.5 - 11.3 k/uL    RBC 5.99 (H) 4.21 - 5.77 m/uL    Hemoglobin 16.7 13.0 - 17.0 g/dL    Hematocrit 50.7 (H) 40.7 - 50.3 %    MCV 84.6 82.6 - 102.9 fL    MCH 27.9 25.2 - 33.5 pg    MCHC 32.9 28.4 - 34.8 g/dL    RDW 13.2 11.8 - 14.4 %    Platelets 121 913 - 841 k/uL    MPV 10.1 8.1 - 13.5 fL    NRBC Automated 0.0 0.0 per 100 WBC    Total CK 149 39 - 308 U/L    CK-MB 3.5 <10.5 ng/mL    % CKMB 2.3 0.0 - 3.5 %    CKMB Interpretation NORMAL ISOENZYME PATTERN     Differential Type NOT REPORTED     Seg Neutrophils 50 36 - 65 %    Lymphocytes 40 24 - 43 %    Monocytes 6 3 - 12 %    Eosinophils % 2 1 - 4 %    Basophils 1 0 - 2 %    Immature Granulocytes 1 (H) 0 %    Segs Absolute 4.09 1.50 - 8.10 k/uL    Absolute Lymph # 3.22 1.10 - 3.70 k/uL    Absolute Mono # 0.52 0.10 - 1.20 k/uL    Absolute Eos # 0.14 0.00 - 0.44 k/uL    Basophils Absolute 0.05 0.00 - 0.20 k/uL    Absolute Immature Granulocyte 0.05 0.00 - 0.30 k/uL    WBC Morphology NOT REPORTED     RBC Morphology NOT REPORTED     Platelet Estimate NOT REPORTED     Myoglobin 61 28 - 72 ng/mL    Protime 11.4 9.1 - 12.3 sec    INR 1.1     PTT 26.1 20.5 - 30.5 sec    Troponin, High Sensitivity 8 0 - 22 ng/L    Troponin T NOT REPORTED <0.03 ng/mL    Troponin Interp NOT REPORTED        Imaging/Diagnostics:      Assessment CT brain without contrast:  Impression   No acute intracranial abnormality.       Mild chronic microvascular disease.         CTA head/neck with and without contrast:  Impression   No acute abnormality or flow-limiting stenosis of the major arteries of the   head and neck.         MRI brain without contrast (limited stroke protocol):  Pending  Recommendations:      Assessment Recommendations:     Confusion and aphasia lasting an hour then resolving-etiology unclear     Other comorbidities include obesity, hyperlipidemia, hypertension.   DDX include ischemia/TIA versus seizures versus hypertensive urgency  CT head without contrast and CTA head and neck with contrast showed no acute intracranial abnormality or large vessel occlusion  Below the patient with dual antiplatelet therapy followed by aspirin 81 and Plavix 75 mg for 21 days  Follow-up MRI  Follow-up stroke work-up including HbA1c, lipid panel, 2D echo with bubble study  Internal  medicine consulted for blood pressure management  Will

## 2021-04-19 NOTE — ED NOTES
Bed: 14  Expected date:   Expected time:   Means of arrival:   Comments:     Irvin Whitfield RN  04/19/21 [id number]

## 2021-04-19 NOTE — ED NOTES
Pt resting in bed comfortably. Family at the bedside. Lights turned down per patient request. Pt on full cardiac monitor. Call light within reach.  Will continue plan of care     Geri Gomez RN  04/19/21 1864

## 2021-04-19 NOTE — ED PROVIDER NOTES
Faculty Sign-Out Attestation  Handoff taken on the following patient from prior Attending Physician: Archie Aldrich    I was available and discussed any additional care issues that arose and coordinated the management plans with the resident(s) caring for the patient during my duty period. Any areas of disagreement with residents documentation of care or procedures are noted on the chart. I was personally present for the key portions of any/all procedures during my duty period. I have documented in the chart those procedures where I was not present during the key portions. 26-year-old male with sudden onset aphasia, resolved after approximately 10 minutes. Similar episode 2 weeks ago. Blood pressure elevated, being started on Cleviprex drip. Admitting to neuro ICU for TIA work-up. MRI pending.     Michelle Berry MD  Attending Physician       Michelle Berry MD  04/19/21 6793

## 2021-04-19 NOTE — ED NOTES
Dr. Debbie Mahan Neurology notified regarding MRI can not be done d/t the patient not having information on cardiac stents.      Roman Mcknight RN  04/19/21 8731

## 2021-04-19 NOTE — FLOWSHEET NOTE
No.    REGISTRATION STAFF NOTIFIED? Yes    WHAT IS YOUR SPIRITUAL CARE PLAN FOR THIS PATIENT?:  Chaplains are available for further spiritual and emotional support as requested by the patient or his family.        Electronically signed by Naomi Myrick on 4/19/2021 at 4:03 PM.  Ed Aguirre  [phone]

## 2021-04-19 NOTE — ED PROVIDER NOTES
[facility] ED  Emergency Department Encounter  EmergencyMedicineResident     This patient was seen during the COVID-19 crisis. There were limited resources and those resources we did have had to be conserved for the sickest of patients. Pt Name: Spencer Ramirez  MRN: [id number]  [place] 1960  Date of evaluation: 4/19/21  PCP: No primary care provider on file. CHIEF COMPLAINT       Chief Complaint   Patient presents with    Altered Mental Status       HISTORY OF PRESENT ILLNESS  (Location/Symptom, Timing/Onset, Context/Setting, Quality, Duration, Modifying Factors, Severity.)      Spencer Ramirez is a 56 y.o. male who presents for evaluation of altered mental status. Patient reports that he had a episode at work where he was confused unable to provide directions to his employee. He states that he was painting in a closed bathroom. I asked him if he believed that it could be due to intoxication from paint fumes he says no in fact he has been a  for 35 years has never had anything like this happen to him before aside from an episode 2 weeks ago when he also had a bout of confusion. Patient states he has a history of hypertension has been out of his medications. Patient denies any focal neurologic deficits at this time. He states that all of his confusion has resolved. Patient states that he did have a heart attack some 20 years ago where he required 3 stents. PAST MEDICAL / SURGICAL /SOCIAL / FAMILY HISTORY      has a past medical history of CAD (coronary artery disease), Hyperlipidemia, and Hypertension. has a past surgical history that includes Cardiac surgery; Appendectomy; and Tonsillectomy.       Social History     Socioeconomic History    Marital status:      Spouse name: Not on file    Number of children: Not on file    Years of education: Not on file    Highest education level: Not on file   Occupational History    Not on file   Social Needs  Financial resource strain: Not on file    Food insecurity     Worry: Not on file     Inability: Not on file   [employer] needs     Medical: Not on file     Non-medical: Not on file   Tobacco Use    Smoking status: Never Smoker    Smokeless tobacco: Never Used   Substance and Sexual Activity    Alcohol use: Not Currently    Drug use: Not on file    Sexual activity: Not on file   Lifestyle    Physical activity     Days per week: Not on file     Minutes per session: Not on file    Stress: Not on file   Relationships    Social connections     Talks on phone: Not on file     Gets together: Not on file     Attends Oriental orthodox service: Not on file     Active member of club or organization: Not on file     Attends meetings of clubs or organizations: Not on file     Relationship status: Not on file    Intimate partner violence     Fear of current or ex partner: Not on file     Emotionally abused: Not on file     Physically abused: Not on file     Forced sexual activity: Not on file   Other Topics Concern    Not on file   Social History Narrative    Not on file       History reviewed. No pertinent family history. Allergies:  Patient has no known allergies. Home Medications:  Prior to Admission medications    Medication Sig Start Date End Date Taking? Authorizing Provider   atorvastatin (LIPITOR) 20 MG tablet TK 1 T PO QD 2/8/17   Historical Provider, MD   lisinopril (PRINIVIL;ZESTRIL) 10 MG tablet TK 1 T PO DAILY 2/8/17   Historical Provider, MD   metoprolol tartrate (LOPRESSOR) 25 MG tablet TK ONE-HALF T BID PO 2/8/17   Historical Provider, MD       REVIEW OF SYSTEMS    (2-9 systems for level 4, 10 or more forlevel 5)      Review of Systems   Constitutional: Negative for activity change, chills and fever. HENT: Negative for congestion, sinus pain and sore throat. Eyes: Negative for pain and visual disturbance. Respiratory: Negative for cough and shortness of breath.     Cardiovascular: Negative for chest pain. Gastrointestinal: Negative for abdominal pain, diarrhea, nausea and vomiting. Genitourinary: Negative for difficulty urinating, dysuria and hematuria. Musculoskeletal: Negative for back pain and myalgias. Skin: Negative for rash and wound. Neurological: Negative for dizziness, light-headedness and headaches. Psychiatric/Behavioral: Positive for confusion. Negative for agitation. PHYSICAL EXAM   (up to 7 for level 4, 8 or more forlevel 5)      ED TRIAGE VITALS BP: (!) 188/132, Temp: 98.4 °F (36.9 °C), Pulse: 106, Resp: 17, SpO2: 95 %    Vitals:    04/19/21 1341 04/19/21 1342 04/19/21 1343 04/19/21 1451   BP: (!) 188/132   (!) 223/154   Pulse:       Resp:       Temp:  98.4 °F (36.9 °C)     TempSrc:  Oral     SpO2: 95%      Weight:   270 lb (122.5 kg)    Height:   6' 1\" (1.854 m)          Physical Exam  Vitals signs and nursing note reviewed. Constitutional:       Appearance: Normal appearance. He is obese. HENT:      Head: Normocephalic and atraumatic. Nose: Nose normal.      Mouth/Throat:      Mouth: Mucous membranes are moist.   Eyes:      General: No visual field deficit. Extraocular Movements: Extraocular movements intact. Pupils: Pupils are equal, round, and reactive to light. Neck:      Musculoskeletal: Normal range of motion. Cardiovascular:      Rate and Rhythm: Normal rate and regular rhythm. Pulses: Normal pulses. Heart sounds: Normal heart sounds. Pulmonary:      Effort: Pulmonary effort is normal.      Breath sounds: Normal breath sounds. Abdominal:      General: Abdomen is flat. Palpations: Abdomen is soft. Musculoskeletal: Normal range of motion. Skin:     General: Skin is warm and dry. Capillary Refill: Capillary refill takes less than 2 seconds. Neurological:      General: No focal deficit present. Mental Status: He is alert and oriented to person, place, and time. Mental status is at baseline.       GCS: GCS eye subscore is 4. GCS verbal subscore is 5. GCS motor subscore is 6. Cranial Nerves: No cranial nerve deficit, dysarthria or facial asymmetry. Motor: No weakness. Coordination: Romberg sign negative. Coordination normal.      Deep Tendon Reflexes: Reflexes normal.   Psychiatric:         Mood and Affect: Mood normal.         Behavior: Behavior normal.           DIFFERENTIAL  DIAGNOSIS     PLAN (LABS / IMAGING / EKG):  Orders Placed This Encounter   Procedures    XR CHEST PORTABLE    CT Head WO Contrast    CTA HEAD NECK W CONTRAST    MRI BRAIN W WO CONTRAST    STROKE PANEL    Hemoglobin and hematocrit, blood    SODIUM (POC)    POTASSIUM (POC)    CHLORIDE (POC)    CALCIUM, IONIC (POC)    Telemetry Monitoring    Inpatient consult to Stroke Team    Inpatient consult to Internal Medicine    Venous Blood Gas, POC    Creatinine W/GFR Point of Care    Lactic Acid, POC    POCT Glucose    Anion Gap (Calc) POC    EKG 12 Lead    PATIENT STATUS (FROM ED OR OR/PROCEDURAL) Inpatient       MEDICATIONS ORDERED:  ED Medication Orders (From admission, onward)    Start Ordered     Status Ordering Provider    04/19/21 1500 04/19/21 1453  clevidipine (CLEVIPREX) infusion  CONTINUOUS      Last MAR action: New Bag - by Gurdeep Woods on 04/19/21 at 2908 [facility] OPAL [place]    04/19/21 1401 04/19/21 1402  iopamidol (ISOVUE-370) 76 % injection 75 mL  IMG ONCE PRN      Last MAR action: Given - by Edis Gavin on 04/19/21 at 1421 OPAL PAGE          DDX: Hypertensive emergency, CVA    DIAGNOSTIC RESULTS / EMERGENCY DEPARTMENT COURSE / MDM     IMPRESSION & INITIAL PLAN:  This 58-year-old male presenting returned today for evaluation of altered mental status. It has resolved at this time however he did have about 45 minutes ago he was unable to provide instructions for his employer at work. States he has similar so 2 weeks prior to this.   History of hypertension CAD requiring PCI some 20 years ago 3.70 k/uL    Absolute Mono # 0.52 0.10 - 1.20 k/uL    Absolute Eos # 0.14 0.00 - 0.44 k/uL    Basophils Absolute 0.05 0.00 - 0.20 k/uL    Absolute Immature Granulocyte 0.05 0.00 - 0.30 k/uL    WBC Morphology NOT REPORTED     RBC Morphology NOT REPORTED     Platelet Estimate NOT REPORTED     Myoglobin 61 28 - 72 ng/mL    Protime 11.4 9.1 - 12.3 sec    INR 1.1     PTT 26.1 20.5 - 30.5 sec    Troponin, High Sensitivity 8 0 - 22 ng/L    Troponin T NOT REPORTED <0.03 ng/mL    Troponin Interp NOT REPORTED    Hemoglobin and hematocrit, blood   Result Value Ref Range    POC Hemoglobin 17.9 (H) 13.5 - 17.5 g/dL    POC Hematocrit 53 41 - 53 %   SODIUM (POC)   Result Value Ref Range    POC Sodium 142 138 - 146 mmol/L   POTASSIUM (POC)   Result Value Ref Range    POC Potassium 3.8 3.5 - 4.5 mmol/L   CHLORIDE (POC)   Result Value Ref Range    POC Chloride 105 98 - 107 mmol/L   CALCIUM, IONIC (POC)   Result Value Ref Range    POC Ionized Calcium 1.12 (L) 1.15 - 1.33 mmol/L   Venous Blood Gas, POC   Result Value Ref Range    pH, Norman 7.407 7.320 - 7.430    pCO2, Norman 45.9 41.0 - 51.0 mm Hg    pO2, Norman 32.1 30 - 50 mm Hg    HCO3, Venous 28.9 22.0 - 29.0 mmol/L    Total CO2, Venous 30 23.0 - 30.0 mmol/L    Negative Base Excess, Norman NOT REPORTED 0.0 - 2.0    Positive Base Excess, Norman 3 0.0 - 3.0    O2 Sat, Norman 62 60.0 - 85.0 %    O2 Device/Flow/% NOT REPORTED     Lg Test NOT REPORTED     Sample Site NOT REPORTED     Mode NOT REPORTED     FIO2 NOT REPORTED     Pt Temp NOT REPORTED     POC pH Temp NOT REPORTED     POC pCO2 Temp NOT REPORTED mm Hg    POC pO2 Temp NOT REPORTED mm Hg   Creatinine W/GFR Point of Care   Result Value Ref Range    POC Creatinine 1.11 0.51 - 1.19 mg/dL    GFR Comment >60 >60 mL/min    GFR Non-African American >60 >60 mL/min    GFR Comment         Lactic Acid, POC   Result Value Ref Range    POC Lactic Acid 1.51 (H) 0.56 - 1.39 mmol/L   POCT Glucose   Result Value Ref Range    POC Glucose 115 (H) 74 - 100 mg/dL   Anion Gap (Calc) POC   Result Value Ref Range    Anion Gap 8 7 - 16 mmol/L       RADIOLOGY:  CTA HEAD NECK W CONTRAST   Final Result   No acute abnormality or flow-limiting stenosis of the major arteries of the   head and neck. CT Head WO Contrast   Final Result   No acute intracranial abnormality. Mild chronic microvascular disease. Findings reported to Dr. Biju Murray at 2:23 p.m. on April 19, 2021. XR CHEST PORTABLE   Final Result   No acute cardiopulmonary pathology. MRI BRAIN W WO CONTRAST    (Results Pending)       CONSULTS:  IP CONSULT TO STROKE TEAM  IP CONSULT TO INTERNAL MEDICINE    CRITICAL CARE:  See attending physician note    FINAL IMPRESSION      1. Cerebrovascular accident (CVA), unspecified mechanism ([address]          DISPOSITION / [address] Admitted 04/19/2021 02:29:18 PM      PATIENT REFERRED TO:  No follow-up provider specified.     DISCHARGE MEDICATIONS:  New Prescriptions    No medications on file     Modified Medications    No medications on file        Aidan Gutiérrez MD  Emergency Medicine Resident    (Please note that portions of this note were completed with a voice recognition program.  Efforts were made to edit the dictations but occasionally words are mis-transcribed.)       Aidan Gutiérrez MD  Resident  04/19/21 1642

## 2021-04-19 NOTE — ED NOTES
Pt arrived to the ED with complaints of confusion. Pt stated that the confusion started 45 min ago when he was painting. Pt stated the he wasn't able to express in word what he wanted his co worker to do. Pt stated that he has a history of high blood pressure and that he doesn't take his meds. Pt stated that he has no confusion at this time. Pt is alert and oriented x 4. Pt stated he was concerned about the confusion and wants to figure out what is is. Pt stated that he has a cardiac history and has had 4 stents placed. Pt denies any chest pain at this time. Pt has no facial drooping, no weakness and no difficulty with speech. Pt placed on bedside monitor, EKG, performed resident at the bedside.       Artur Saucedo RN  04/19/21 9943

## 2021-04-19 NOTE — CARE COORDINATION
Case Management Initial Discharge Plan  [address],             Met with:patient to discuss discharge plans. Information verified: address, contacts, phone number, , insurance Yes    Emergency Contact/Next of Kin name & number:   Selvin Quintana    Spouse    [phone]         PCP: Dr Emelyn Velazquez  Date of last visit: years    Insurance Provider: [id number] [employer].    Discharge Planning    Living Arrangements:  Spouse/Significant Other   Support Systems:  Family Members    Home has 1 stories  2 stairs to climb to get into front door, 0stairs to climb to reach second floor  Location of bedroom/bathroom in home main    Patient able to perform ADL's:Independent    Current Services (outpatient & in home) none  DME equipment: none  DME provider: none    Receiving oral anticoagulation therapy? No    If indicated:   Physician managing anticoagulation treatment: n/a  Where does patient obtain lab work for ATC treatment? n/a      Potential Assistance Needed:  N/A    Patient agreeable to home care: No  [place] of choice provided:  n/a    Prior SNF/Rehab Placement and Facility: none  Agreeable to SNF/Rehab: No  [place] of choice provided: n/a     Evaluation: no    Expected Discharge date:       Patient expects to be discharged to:  home  Follow Up Appointment: Best Day/ Time:      Transportation provider: wife  Transportation arrangements needed for discharge: No    Readmission Risk              Risk of Unplanned Readmission:        7             Does patient have a readmission risk score greater than 14?: No  If yes, follow-up appointment must be made within 7 days of discharge.      Goals of Care: to get well      Discharge Plan: Plan is to return home independently pcp established has transportation          Electronically signed by Av Rodriguez RN on 21 at 5:12 PM EDT

## 2021-04-19 NOTE — ED NOTES
Pt resting in bed comfortably. Pt on full cardiac monitor. Call light within reach. Family at the bedside. Lights turned down per family request. Bed locked and in the lowest position. No complaints at this time.  Will continue plan of care      Kannan Pace RN  04/19/21 0250

## 2021-04-19 NOTE — CONSULTS
Endovascular Note    [x]   Stroke Alert          []   Stroke Priority          []    Stroke Consult   Paged @ [phone]  ER Room # 14  Arrival to patient bedside @ [phone]  4/19/2021 1:58 PM    Pt Name: Rona Lawrence  MRN: [id number]  YOB: 1960  Date of evaluation: 4/19/2021  Primary Care Physician: No primary care provider on file. Rona Lawrence is a 56 y.o. male who presents with a history of hypertension, hyperlipidemia coronary artery disease scented to the ED with last well-known 1 PM when he was at work and he noticed he could not get the words out and was confused. Given our to return back to his baseline and he decided to come to the ED for evaluation. The patient SBP is to be in 190s, back to his baseline with no neurological deficit. He is to take Lopressor 25 mg and lisinopril 10 mg but has been noncompliant with it he is not on any kind of anticoagulation or antiplatelet at home. Date last known well: 4/19/2021  Time last known well: 1300  NIHSS:0  Blood Glucose: 113   Blood Pressure:  188/132    At home on AC/AP: No                Allergies  has No Known Allergies. Medications  Prior to Admission medications    Medication Sig Start Date End Date Taking? Authorizing Provider   atorvastatin (LIPITOR) 20 MG tablet TK 1 T PO QD 2/8/17   Historical Provider, MD   lisinopril (PRINIVIL;ZESTRIL) 10 MG tablet TK 1 T PO DAILY 2/8/17   Historical Provider, MD   metoprolol tartrate (LOPRESSOR) 25 MG tablet TK ONE-HALF T BID PO 2/8/17   Historical Provider, MD    Scheduled Meds:  Continuous Infusions:  PRN Meds:.    Past Medical History   has a past medical history of CAD (coronary artery disease), Hyperlipidemia, and Hypertension.     Social History:  Social History     Tobacco History     Smoking Status  Never Smoker    Smokeless Tobacco Use  Never Used          Alcohol History     Alcohol Use Status  Not Currently          Drug Use     Drug Use Status  Not Asked          Sexual Activity Sexually Active  Not Asked                  Family History:  History reviewed. No pertinent family history. OBJECTIVE  BP (!) 188/132   Pulse 106   Temp 98.4 °F (36.9 °C) (Oral)   Resp 17   Ht 6' 1\" (1.854 m)   Wt 270 lb (122.5 kg)   SpO2 95%   BMI 35.62 kg/m²     ROS  CONSTITUTIONAL: negative for fatigue and malaise   EYES: negative for double vision and photophobia    HEENT: negative for tinnitus and sore throat   RESPIRATORY: negative for cough, shortness of breath   CARDIOVASCULAR: negative for chest pain, palpitations   GASTROINTESTINAL: negative for nausea, vomiting   GENITOURINARY: negative for incontinence   MUSCULOSKELETAL: negative for neck or back pain   NEUROLOGICAL: negative for seizures   PSYCHIATRIC: negative for agitated     Review of systems otherwise negative.               PHYSICAL EXAM    GENERAL  Appears comfortable and in no distress   HEENT  NC/ AT   HEART  S1 and S2 heard; palpation of pulses: radial pulse    NECK  Supple and no bruits heard   MENTAL STATUS:  Alert, oriented, intact memory, no confusion, normal speech, normal language, no hallucination or delusion   CRANIAL NERVES: II     -      Visual fields intact to confrontation  III,IV,VI -  PERR, EOMs full, no ptosis  V     -     Normal facial sensation   VII    -     Normal facial symmetry  VIII   -     Intact hearing   IX,X -     Symmetrical palate  XI    -     Symmetrical shoulder shrug  XII   -     Midline tongue, no atrophy    MOTOR FUNCTION: RUE: Significant for good strength of grade 5/5 in proximal and distal muscle groups   LUE: Significant for good strength of grade 5/5 in proximal and distal muscle groups   RLE: Significant for good strength of grade 5/5 in proximal and distal muscle groups   LLE: Significant for good strength of grade 5/5 in proximal and distal muscle groups      Normal bulk, normal tone and no involuntary movements, no tremor   SENSORY FUNCTION:  Normal touch, normal pin, normal vibration, normal TIA     Other Symptoms                 Speech Disturbances W/O Weakness   Unilateral Weakness     [x] 0  [] 1  [] 2   Duration of symptoms     < 10 Minutes                                     10-59 Minutes   ? 61 Minutes     [] 0  [x] 1  [] 2   Diabetes     No                    Yes     [x] 0  [] 1   TOTAL 3   0-3 Points: Low Risk -> Work up could be done OPD   2-Day Stroke Risk: 1%   7- Day Stroke Risk: 1.2%   90 Days Stroke Risk: 3.1%    4-5 Points: Moderate Risk    2-Day Stroke Risk: 4.1%   7- Day Stroke Risk: 5.9%    90 Days Stroke Risk: 9.8%    6-7 Points: High Risk -> Warrant Admission for Workup   2-Day Stroke Risk: 8.1%   7- Day Stroke Risk: 11.7%   90 Days Stroke Risk: 17.8%      Imaging:  CT brain without contrast:  Impression   No acute intracranial abnormality.       Mild chronic microvascular disease. CTA head/neck with and without contrast:  Impression   No acute abnormality or flow-limiting stenosis of the major arteries of the   head and neck. MRI brain without contrast (limited stroke protocol):  Pending    Intervention Assessment:  Last Known Well (date and time): 1300  Candidate for IV tPA therapy     Yes []         No  [x] symptoms resolved      Candidate for Thrombectomy    Yes []      No [x] due to the following exclusion criteria: no LVO        ASSESSMENT RECOMMENDATIONS:    Confusion and aphasia lasting an hour then resolving-etiology unclear    Other comorbidities include obesity, hyperlipidemia, hypertension. DDX include ischemia/TIA versus seizures versus hypertensive urgency  CT head without contrast and CTA head and neck with contrast showed no acute intracranial abnormality or large vessel occlusion  . Patient with dual antiplatelet therapy followed by aspirin 81 mg Plavix 75 mg for 21 days then aspirin 81 only  Follow-up EEG to rule out seizures  Follow-up MRI  Follow-up stroke work-up including HbA1c, lipid panel, 2D echo with bubble study  Internal  medicine consulted for blood pressure management  PT/OT/SLP          Additional recommendations may follow    Please contact [facility] with any changes in patients neurologic status.                Disposition:  [x] Neuro stepdown-prefer [facility]  [] ICU Status - prefer Neuro ICU (5b)  [] Internal Medicine Team  [] Observation Status        Discussed with Dr. Horacio FONTANEZ MD  Neurology Resident,PGY-4    Electronically signed 4/19/2021 at 1:58 PM

## 2021-04-19 NOTE — ED PROVIDER NOTES
[facility] [address] [facility]     Emergency Department     Faculty Attestation    I performed a history and physical examination of the patient and discussed management with the resident. I reviewed the residents note and agree with the documented findings and plan of care. Any areas of disagreement are noted on the chart. I was personally present for the key portions of any procedures. I have documented in the chart those procedures where I was not present during the key portions. I have reviewed the emergency nurses triage note. I agree with the chief complaint, past medical history, past surgical history, allergies, medications, social and family history as documented unless otherwise noted below. For Physician Assistant/ Nurse Practitioner cases/documentation I have personally evaluated this patient and have completed at least one if not all key elements of the E/M (history, physical exam, and MDM). Additional findings are as noted. This patient was evaluated in the Emergency Department for symptoms described in the history of present illness. He/she was evaluated in the context of the global COVID-19 pandemic, which necessitated consideration that the patient might be at risk for infection with the SARS-CoV-2 virus that causes COVID-19. Institutional protocols and algorithms that pertain to the evaluation of patients at risk for COVID-19 are in a state of rapid change based on information released by regulatory bodies including the CDC and federal and state organizations. These policies and algorithms were followed during the patient's care in the ED. Here with episode of confusion difficulty speaking that is since resolved. Began suddenly while at work today as a  was trying to explain to his fellow  what needed to be done and the words just \"would not come out. \"  Patient was aware of this was concerned troubled by it has since resolved. No headache, no vision changes no neck pain. Of note patient had a similar episode approximately 2 weeks ago that lasted 15 minutes. He did not seek medical attention at that time. Patient has a history of hypertension but has not been taking his medication for over a year. At baseline now here with the son who agrees normal speech. Normal mental status on exam normal pupils. No neuro deficits. However given onset of symptoms with hypertension will call stroke alert. EKG interpretation: Sinus rhythm 105. Normal intervals. Normal axis no acute ST or T changes.     Critical Care     none    Devante Berry MD, Mary Lozoya  Attending Emergency  Physician             Devante Berry MD  04/19/21 [id number]

## 2021-04-19 NOTE — ED NOTES
Per MRI tech, unable to perform MRI d/t cardiac stent placement (4) 15-18 years ago and pt does not have information/card on him.       Caroline Franco RN  04/19/21 3121

## 2021-04-19 NOTE — CONSULTS
[address]     Department of Internal Medicine - Staff Internal Medicine Teaching Service          ADMISSION NOTE/HISTORY AND PHYSICAL EXAMINATION   Date: 4/19/2021  Patient Name: Elmer Green  Date of admission: 4/19/2021  1:35 PM  YOB: 1960  PCP: No primary care provider on file. History Obtained From:  patient    Consult Reason:     Consult Reason: Management of high blood pressure    HISTORY OF PRESENTING ILLNESS     The patient is a pleasant 56 y.o. male with a history of cardiac stents placed 15 to 18 years ago, hypertension not on medication at home, presents with a chief complaint of confusion for 45 minutes, which resolved spontaneously, is currently alert, oriented x3. Patient reports he was not able to express in words to his coworker whatever he wants to say to him. 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 Patient does complain of headache 7 out of 10 in intensity. Denies chest pain, shortness of breath, fever, chills, nausea, vomiting. No focal deficits. Stroke alert was called. Neurology consulted from the ER. CT head without contrast, CTA head neck with contrast negative for any acute intracranial hemorrhage. MRI brain ordered, but was not done due to incomplete information on his cardiac stents. Of note, patient reports he is supposed to be on Lopressor 25 mg and lisinopril 10 mg at home, is noncompliant with his medication. Does not take any other medication at home. In the ER, patient's blood pressure is 220/141, tachycardic pulse in the 100s, saturating well on room air, afebrile. Chest x-ray reviewed with no acute cardiopulmonary pathology. Physical examination is unremarkable.     Review of Systems:  General ROS: Completed and except as mentioned above were Vitals: BP (!) 157/95   Pulse 85   Temp 98.4 °F (36.9 °C) (Oral)   Resp 19   Ht 6' 1\" (1.854 m)   Wt 270 lb (122.5 kg)   SpO2 94%   BMI 35.62 kg/m²   Tmax: Temp (24hrs), Av.4 °F (36.9 °C), Min:98.4 °F (36.9 °C), Max:98.4 °F (36.9 °C)    Last Body weight:   Wt Readings from Last 3 Encounters:   21 270 lb (122.5 kg)   19 282 lb (127.9 kg)     Body Mass Index : Body mass index is 35.62 kg/m². PHYSICAL EXAMINATION:  Constitutional: This is a well developed, well nourished, 35-39.9 - Obesity Grade II 56y.o. year old male who is alert, oriented, cooperative and in no apparent distress. Head:normocephalic and atraumatic. EENT:  PERRLA. No conjunctival injections. Septum was midline, mucosa was without erythema, exudates or cobblestoning. No thrush was noted. Neck: Supple without thyromegaly. No elevated JVP. Trachea was midline. Respiratory: Chest was symmetrical without dullness to percussion. Breath sounds bilaterally were clear to auscultation. There were no wheezes, rhonchi or rales. There is no intercostal retraction or use of accessory muscles. No egophony noted. Cardiovascular: Regular without murmur, clicks, gallops or rubs. Abdomen: Slightly rounded and soft without organomegaly. No rebound, rigidity or guarding was appreciated. Lymphatic: No lymphadenopathy. Musculoskeletal: Normal curvature of the spine. No gross muscle weakness. Extremities:  No lower extremity edema, ulcerations, tenderness, varicosities or erythema. Muscle size, tone and strength are normal.  No involuntary movements are noted. Skin:  Warm and dry. Good color, turgor and pigmentation. No lesions or scars.   No cyanosis or clubbing  Neurological/Psychiatric: The patient's general behavior, level of consciousness, thought content and emotional status is normal.          INVESTIGATIONS     Laboratory Testing:     Recent Results (from the past 24 hour(s))   Venous Blood Gas, POC Range    Glucose 113 (H) 70 - 99 mg/dL    BUN 15 8 - 23 mg/dL    CREATININE 0.76 0.70 - 1.20 mg/dL    Bun/Cre Ratio NOT REPORTED 9 - 20    Calcium 9.7 8.6 - 10.4 mg/dL    Sodium 139 135 - 144 mmol/L    Potassium 3.9 3.7 - 5.3 mmol/L    Chloride 103 98 - 107 mmol/L    CO2 24 20 - 31 mmol/L    Anion Gap 12 9 - 17 mmol/L    GFR Non-African American >60 >60 mL/min    GFR African American >60 >60 mL/min    GFR Comment          GFR Staging NOT REPORTED     WBC 8.1 3.5 - 11.3 k/uL    RBC 5.99 (H) 4.21 - 5.77 m/uL    Hemoglobin 16.7 13.0 - 17.0 g/dL    Hematocrit 50.7 (H) 40.7 - 50.3 %    MCV 84.6 82.6 - 102.9 fL    MCH 27.9 25.2 - 33.5 pg    MCHC 32.9 28.4 - 34.8 g/dL    RDW 13.2 11.8 - 14.4 %    Platelets 200 914 - 261 k/uL    MPV 10.1 8.1 - 13.5 fL    NRBC Automated 0.0 0.0 per 100 WBC    Total  39 - 308 U/L    CK-MB 3.5 <10.5 ng/mL    % CKMB 2.3 0.0 - 3.5 %    CKMB Interpretation NORMAL ISOENZYME PATTERN     Differential Type NOT REPORTED     Seg Neutrophils 50 36 - 65 %    Lymphocytes 40 24 - 43 %    Monocytes 6 3 - 12 %    Eosinophils % 2 1 - 4 %    Basophils 1 0 - 2 %    Immature Granulocytes 1 (H) 0 %    Segs Absolute 4.09 1.50 - 8.10 k/uL    Absolute Lymph # 3.22 1.10 - 3.70 k/uL    Absolute Mono # 0.52 0.10 - 1.20 k/uL    Absolute Eos # 0.14 0.00 - 0.44 k/uL    Basophils Absolute 0.05 0.00 - 0.20 k/uL    Absolute Immature Granulocyte 0.05 0.00 - 0.30 k/uL    WBC Morphology NOT REPORTED     RBC Morphology NOT REPORTED     Platelet Estimate NOT REPORTED     Myoglobin 61 28 - 72 ng/mL    Protime 11.4 9.1 - 12.3 sec    INR 1.1     PTT 26.1 20.5 - 30.5 sec    Troponin, High Sensitivity 8 0 - 22 ng/L    Troponin T NOT REPORTED <0.03 ng/mL    Troponin Interp NOT REPORTED    TSH with Reflex    Collection Time: 04/19/21  2:00 PM   Result Value Ref Range    TSH 1.35 0.30 - 5.00 mIU/L       Imaging:   Ct Head Wo Contrast    Result Date: 4/19/2021  No acute intracranial abnormality. Mild chronic microvascular disease. Findings reported to Dr. Clifton Watkins at 2:23 p.m. on April 19, 2021. Xr Chest Portable    Result Date: 4/19/2021  No acute cardiopulmonary pathology. Cta Head Neck W Contrast    Result Date: 4/19/2021  No acute abnormality or flow-limiting stenosis of the major arteries of the head and neck. ASSESSMENT & PLAN     ASSESSMENT / PLAN:     Thank you for allowing us to see Chayito Che in consultation for management of high blood pressure in the setting of ischemic stroke vs TIA vs seizures. Hypertensive emergency  -Allow permissive hypertension with SBP titrated to not less than 180 for 24 hours (goal -220)  -Discontinue clevidipine drip, current , discussed with RN in the ED  -CT head, CTA head reviewed. -Follow-up on MRI brain, stroke work-up  -After 24 hours, will start on lisinopril for good long-term management.  -Follow-up on echo, blood tox  -TSH within normal limits  -EKG reviewed -sinus rhythm, no acute ST-T wave changes noted. Leandro Theodore MD  Internal Medicine Resident, PGY-1  [facility];  [place], [place]  4/19/2021, 7:14 PM

## 2021-04-19 NOTE — ED NOTES
Patient is achieving very good glycemic control based upon self-reported home glucose readings. Check glycohemoglobin level today. Continue consistent carbohydrate diet. Pt resting in bed on full monitor. Call light within reach. Pt has no complaints currently. Bed locked and in the lowest position.  Will continue plan of care     Jannette Dumont RN  04/19/21 [id number]

## 2021-04-20 ENCOUNTER — APPOINTMENT (OUTPATIENT)
Dept: CT IMAGING | Age: 61
DRG: 069 | End: 2021-04-20
Payer: COMMERCIAL

## 2021-04-20 VITALS
WEIGHT: 270 LBS | RESPIRATION RATE: 20 BRPM | SYSTOLIC BLOOD PRESSURE: 151 MMHG | TEMPERATURE: 97.8 F | DIASTOLIC BLOOD PRESSURE: 106 MMHG | HEART RATE: 74 BPM | OXYGEN SATURATION: 95 % | HEIGHT: 73 IN | BODY MASS INDEX: 35.78 KG/M2

## 2021-04-20 PROBLEM — G45.9 TIA (TRANSIENT ISCHEMIC ATTACK): Status: ACTIVE | Noted: 2021-04-20

## 2021-04-20 LAB
CHOLESTEROL/HDL RATIO: 4.5
CHOLESTEROL: 165 MG/DL
EKG ATRIAL RATE: 105 BPM
EKG P AXIS: 14 DEGREES
EKG P-R INTERVAL: 148 MS
EKG Q-T INTERVAL: 316 MS
EKG QRS DURATION: 94 MS
EKG QTC CALCULATION (BAZETT): 417 MS
EKG R AXIS: 2 DEGREES
EKG T AXIS: -98 DEGREES
EKG VENTRICULAR RATE: 105 BPM
ESTIMATED AVERAGE GLUCOSE: 111 MG/DL
HBA1C MFR BLD: 5.5 % (ref 4–6)
HCT VFR BLD CALC: 50.5 % (ref 40.7–50.3)
HDLC SERPL-MCNC: 37 MG/DL
HEMOGLOBIN: 16.3 G/DL (ref 13–17)
LDL CHOLESTEROL: 86 MG/DL (ref 0–130)
LV EF: 53 %
LVEF MODALITY: NORMAL
MCH RBC QN AUTO: 27.4 PG (ref 25.2–33.5)
MCHC RBC AUTO-ENTMCNC: 32.3 G/DL (ref 28.4–34.8)
MCV RBC AUTO: 84.9 FL (ref 82.6–102.9)
NRBC AUTOMATED: 0 PER 100 WBC
PDW BLD-RTO: 13.3 % (ref 11.8–14.4)
PLATELET # BLD: 193 K/UL (ref 138–453)
PMV BLD AUTO: 9.4 FL (ref 8.1–13.5)
RBC # BLD: 5.95 M/UL (ref 4.21–5.77)
TRIGL SERPL-MCNC: 212 MG/DL
VLDLC SERPL CALC-MCNC: ABNORMAL MG/DL (ref 1–30)
WBC # BLD: 8.2 K/UL (ref 3.5–11.3)

## 2021-04-20 PROCEDURE — 83036 HEMOGLOBIN GLYCOSYLATED A1C: CPT

## 2021-04-20 PROCEDURE — 92523 SPEECH SOUND LANG COMPREHEN: CPT

## 2021-04-20 PROCEDURE — 97161 PT EVAL LOW COMPLEX 20 MIN: CPT

## 2021-04-20 PROCEDURE — 6370000000 HC RX 637 (ALT 250 FOR IP): Performed by: STUDENT IN AN ORGANIZED HEALTH CARE EDUCATION/TRAINING PROGRAM

## 2021-04-20 PROCEDURE — 99223 1ST HOSP IP/OBS HIGH 75: CPT | Performed by: PSYCHIATRY & NEUROLOGY

## 2021-04-20 PROCEDURE — 6370000000 HC RX 637 (ALT 250 FOR IP): Performed by: FAMILY MEDICINE

## 2021-04-20 PROCEDURE — 97116 GAIT TRAINING THERAPY: CPT

## 2021-04-20 PROCEDURE — 70450 CT HEAD/BRAIN W/O DYE: CPT

## 2021-04-20 PROCEDURE — 93306 TTE W/DOPPLER COMPLETE: CPT

## 2021-04-20 PROCEDURE — 99232 SBSQ HOSP IP/OBS MODERATE 35: CPT | Performed by: INTERNAL MEDICINE

## 2021-04-20 PROCEDURE — 80061 LIPID PANEL: CPT

## 2021-04-20 PROCEDURE — 85027 COMPLETE CBC AUTOMATED: CPT

## 2021-04-20 RX ORDER — ASPIRIN 81 MG/1
81 TABLET, CHEWABLE ORAL DAILY
Qty: 30 TABLET | Refills: 3 | Status: CANCELLED | OUTPATIENT
Start: 2021-04-20

## 2021-04-20 RX ORDER — ATORVASTATIN CALCIUM 40 MG/1
40 TABLET, FILM COATED ORAL NIGHTLY
Qty: 30 TABLET | Refills: 3 | Status: ON HOLD | OUTPATIENT
Start: 2021-04-20 | End: 2021-09-14 | Stop reason: HOSPADM

## 2021-04-20 RX ORDER — ASPIRIN 81 MG/1
81 TABLET, CHEWABLE ORAL DAILY
Qty: 30 TABLET | Refills: 3 | Status: ON HOLD | OUTPATIENT
Start: 2021-04-20 | End: 2022-04-21 | Stop reason: HOSPADM

## 2021-04-20 RX ORDER — ATORVASTATIN CALCIUM 40 MG/1
40 TABLET, FILM COATED ORAL NIGHTLY
Status: DISCONTINUED | OUTPATIENT
Start: 2021-04-20 | End: 2021-04-20 | Stop reason: HOSPADM

## 2021-04-20 RX ORDER — ATORVASTATIN CALCIUM 40 MG/1
40 TABLET, FILM COATED ORAL NIGHTLY
Qty: 30 TABLET | Refills: 3 | Status: CANCELLED | OUTPATIENT
Start: 2021-04-20

## 2021-04-20 RX ORDER — CLOPIDOGREL BISULFATE 75 MG/1
75 TABLET ORAL DAILY
Qty: 19 TABLET | Refills: 0 | Status: SHIPPED | OUTPATIENT
Start: 2021-04-21 | End: 2021-09-13

## 2021-04-20 RX ORDER — LISINOPRIL 10 MG/1
10 TABLET ORAL DAILY
Qty: 30 TABLET | Refills: 3 | Status: SHIPPED | OUTPATIENT
Start: 2021-04-20 | End: 2021-09-13

## 2021-04-20 RX ORDER — ACETAMINOPHEN 325 MG/1
650 TABLET ORAL EVERY 4 HOURS PRN
Status: DISCONTINUED | OUTPATIENT
Start: 2021-04-20 | End: 2021-04-20 | Stop reason: HOSPADM

## 2021-04-20 RX ORDER — CLOPIDOGREL BISULFATE 75 MG/1
75 TABLET ORAL DAILY
Qty: 21 TABLET | Refills: 0 | Status: CANCELLED | OUTPATIENT
Start: 2021-04-21 | End: 2021-05-12

## 2021-04-20 RX ORDER — LISINOPRIL 10 MG/1
10 TABLET ORAL DAILY
Status: DISCONTINUED | OUTPATIENT
Start: 2021-04-20 | End: 2021-04-20 | Stop reason: HOSPADM

## 2021-04-20 RX ORDER — LISINOPRIL 10 MG/1
10 TABLET ORAL DAILY
Qty: 30 TABLET | Refills: 3 | Status: CANCELLED | OUTPATIENT
Start: 2021-04-21

## 2021-04-20 RX ORDER — ASPIRIN 81 MG/1
81 TABLET, CHEWABLE ORAL DAILY
COMMUNITY
End: 2021-04-20 | Stop reason: SDUPTHER

## 2021-04-20 RX ADMIN — METOPROLOL TARTRATE 12.5 MG: 25 TABLET ORAL at 16:43

## 2021-04-20 RX ADMIN — CLOPIDOGREL BISULFATE 75 MG: 75 TABLET ORAL at 10:13

## 2021-04-20 RX ADMIN — ACETAMINOPHEN 650 MG: 325 TABLET ORAL at 02:59

## 2021-04-20 RX ADMIN — LISINOPRIL 10 MG: 10 TABLET ORAL at 13:00

## 2021-04-20 NOTE — PROGRESS NOTES
[address] [facility]  Speech Language Pathology    Date: 4/20/2021  Patient Name: Elmer Green  YOB: 1960   AGE: 56 y.o. MRN: [id number]        Patient Not Available for Speech Therapy     Due to:  [] Testing  [] Hemodialysis  [] Cancelled by RN  [] Surgery   [] Intubation/Sedation/Pain Medication  [] Medical instability  [x] Other: Pt at ECHO.  ST to continue to follow     Next scheduled treatment: 4/20 in PM as able; 4/21   Completed by: CARLOS Anderson

## 2021-04-20 NOTE — PROGRESS NOTES
Present: Yes(wife)  Social/Functional History  Lives With: Spouse  Vision  Vision: Within Functional Limits  Hearing  Hearing: Exceptions to [facility]  Hearing Exceptions: Hard of hearing/hearing concerns        Objective:     Oral/Motor  Oral Motor: Within functional limits       Motor Speech  Motor Speech: Within Functional Limits       Cognition:      Orientation  Overall Orientation Status: Within Normal Limits  Attention  Attention: Within Functional Limits  Memory  Memory: Within Funtional Limits  Problem Solving  Problem Solving: Within Functional Limits  Abstract Reasoning  Abstract Reasoning: Within Functional Limits  Safety/Judgement  Safety/Judgement: Within Functional Limits   Sequencing: Within Functional Limits       Prognosis:  Speech Therapy Prognosis  Prognosis: Good  Prognosis Considerations: Age;Previous Level of Function  Individuals consulted  Consulted and agree with results and recommendations: Patient    Education:  Patient Education: yes  Patient Education Response: Verbalizes understanding          Therapy Time:   Individual Concurrent Group Co-treatment   Time In 1350         Time Out 1400         Minutes 10                 Completed by:  Rebecca Kramer  Clinician    Cosigned By: Krysta Reynolds S.CCC/SLP        4/20/2021 2:38 PM

## 2021-04-20 NOTE — PROGRESS NOTES
 diphenhydrAMINE  25 mg Intravenous Once     PRN Meds include: acetaminophen, sodium chloride flush, sodium chloride, promethazine **OR** ondansetron, polyethylene glycol    Objective:   BP (!) 159/102   Pulse 79   Temp 98.4 °F (36.9 °C) (Oral)   Resp 16   Ht 6' 1\" (1.854 m)   Wt 270 lb (122.5 kg)   SpO2 90%   BMI 35.62 kg/m²     Blood pressure range: Systolic (25QSA), ALN:140 , Min:140 , WGX:320   ; Diastolic (63KIX), TQ, Min:91, Max:154      ROS:  CONSTITUTIONAL: negative for fatigue and malaise   EYES: negative for double vision and photophobia    HEENT: negative for tinnitus and sore throat   RESPIRATORY: negative for cough, shortness of breath   CARDIOVASCULAR: negative for chest pain, palpitations, or syncope   GASTROINTESTINAL: negative for abdominal pain, nausea, vomiting, diarrhea, or constipation    GENITOURINARY: negative for incontinence or retention    MUSCULOSKELETAL: negative for neck or back pain, negative for extremity pain   NEUROLOGICAL: Negative for seizures, headaches, weakness, numbness, confusion, aphasia, dysarthria   PSYCHIATRIC: negative for agitation, hallucination, SI/HI   SKIN Negative for spontaneous contusions, rashes, or lesions        NEUROLOGIC EXAMINATION  GENERAL  Appears comfortable and in no distress   HEENT  NC/ AT   HEART  S1 and S2 heard; palpation of pulses: radial pulse    NECK  Supple and no bruits heard   MENTAL STATUS:  Alert, oriented, intact memory, no confusion, normal speech, normal language, no hallucination or delusion   CRANIAL NERVES: II     -      Visual fields intact to confrontation  III,IV,VI -  PERR, EOMs full, no ptosis  V     -     Normal facial sensation   VII    -     Normal facial symmetry  VIII   -     Intact hearing   IX,X -     Symmetrical palate  XI    -     Symmetrical shoulder shrug  XII   -     Midline tongue, no atrophy    MOTOR FUNCTION: RUE: Significant for good strength of grade 5/5 in proximal and distal muscle groups   LUE: Significant for good strength of grade 5/5 in proximal and distal muscle groups   RLE: Significant for good strength of grade 5/5 in proximal and distal muscle groups   LLE: Significant for good strength of grade 5/5 in proximal and distal muscle groups      Normal bulk, normal tone and no involuntary movements, no tremor   SENSORY FUNCTION:  Normal touch, normal pin, normal vibration, normal proprioception   CEREBELLAR FUNCTION:  Intact fine motor control over upper limbs and lower limbs   REFLEX FUNCTION:  Symmetric in upper and lower extremities, no Babinski sign   STATION and GAIT  Not tested     Data:    Lab Results:   CBC:   Recent Labs     04/19/21  1400 04/20/21  0502   WBC 8.1 8.2   HGB 16.7 16.3    193     BMP:    Recent Labs     04/19/21  1345 04/19/21  1400   NA  --  139   K  --  3.9   CL  --  103   CO2  --  24   BUN  --  15   CREATININE 1.11 0.76   GLUCOSE  --  113*         Lab Results   Component Value Date    CHOL 165 04/20/2021    LDLCHOLESTEROL 86 04/20/2021    HDL 37 (L) 04/20/2021    TRIG 212 (H) 04/20/2021    TSH 1.35 04/19/2021    INR 1.1 04/19/2021    LABA1C 5.5 04/20/2021       No results found for: PHENYTOIN, PHENYTOIN, VALPROATE, CBMZ    IMAGING  CT Head WO Contrast 4/19/2021  Impression   No acute intracranial abnormality.       Mild chronic microvascular disease.       Findings reported to Dr. Katarzyna Mcmullen at 2:23 p.m. on April 19, 2021. CTA Head Neck W Contrast 4/19/2021  Impression   No acute abnormality or flow-limiting stenosis of the major arteries of the   head and neck. Assessment and Plan  20-year-old male with past medical history of hypertension, hyperlipidemia, CAD status post stent placement about 20 yrs ago who presented with transient episode of expressive aphasia with resolution in about 30 to 45 minutes after onset. Noted to also have hypertensive urgency.     // Transient aphasia with resolution, likely TIA  - A1c 5.5, LDL 86  - CT Head mild chronic microvascular disease  - CTA Head Neck no acute abnormality or flow-limiting stenosis  - Unable to obtain MRI brain as patient does not have stent cards and unknown compatibility. We will repeat head CT  - 2D echo pending  - We will continue dual antiplatelet therapy of aspirin 81 and Plavix 75 for 21 days followed by monotherapy of aspirin 81 only thereafter.  - Continue high intensity statin, lipitor 40mg    //HTN, CAD  - IM on board for medical mgmt  - ok to normalize BP    Further recommendations/modifications may follow after discussing with attending physician.     Aman Muhammad MD   Neurology PGY-2  4/20/2021  10:19 AM

## 2021-04-20 NOTE — PROGRESS NOTES
negative   HEENT ROS: Completed and except as mentioned above were negative   Allergy and Immunology ROS:  Completed and except as mentioned above were negative  Hematological and Lymphatic ROS:  Completed and except as mentioned above were negative  Respiratory ROS:  Completed and except as mentioned above were negative  Cardiovascular ROS:  Completed and except as mentioned above were negative  Gastrointestinal ROS: Completed and except as mentioned above were negative  Genito-Urinary ROS:  Completed and except as mentioned above were negative  Musculoskeletal ROS:  Completed and except as mentioned above were negative  Neurological ROS: Improved headache. Positive for confusion, dysarthria for 45 minutes, which is currently resolved. Completed and except as mentioned above were negative  Skin & Dermatological ROS:  Completed and except as mentioned above were negative  Psychological ROS:  Completed and except as mentioned above were negative    PAST MEDICAL HISTORY     Past Medical History:   Diagnosis Date    CAD (coronary artery disease)     Hyperlipidemia     Hypertension        PAST SURGICAL HISTORY     Past Surgical History:   Procedure Laterality Date    APPENDECTOMY      CARDIAC SURGERY      4 stents placed in 2002    TONSILLECTOMY      as a child       ALLERGIES     Patient has no known allergies. MEDICATIONS PRIOR TO ADMISSION     Prior to Admission medications    Medication Sig Start Date End Date Taking? 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 No pertinent family history.     PHYSICAL EXAM     Vitals: BP (!) 159/102   Pulse 79   Temp 98.4 °F (36.9 °C) (Oral)   Resp 16   Ht 6' 1\" (1.854 m)   Wt 270 lb (122.5 kg)   SpO2 90%   BMI 35.62 kg/m²   Tmax: Temp (24hrs), Av.4 °F (36.9 °C), Min:98.4 °F (36.9 °C), Max:98.4 °F (36.9 °C)    Last Body weight:   Wt Readings from Last 3 Encounters:   21 270 lb (122.5 kg)   19 282 lb (127.9 kg)     Body Mass Index : Body mass index is 35.62 kg/m². PHYSICAL EXAMINATION:  Constitutional: This is a well developed, well nourished, 35-39.9 - Obesity Grade II 56y.o. year old male who is alert, oriented, cooperative and in no apparent distress. Head:normocephalic and atraumatic. EENT:  PERRLA. No conjunctival injections. Septum was midline, mucosa was without erythema, exudates or cobblestoning. No thrush was noted. Neck: Supple without thyromegaly. No elevated JVP. Trachea was midline. Respiratory: Chest was symmetrical without dullness to percussion. Breath sounds bilaterally were clear to auscultation. There were no wheezes, rhonchi or rales. There is no intercostal retraction or use of accessory muscles. No egophony noted. Cardiovascular: Regular without murmur, clicks, gallops or rubs. Abdomen: Slightly rounded and soft without organomegaly. No rebound, rigidity or guarding was appreciated. Lymphatic: No lymphadenopathy. Musculoskeletal: Normal curvature of the spine. No gross muscle weakness. Extremities:  No lower extremity edema, ulcerations, tenderness, varicosities or erythema. Muscle size, tone and strength are normal.  No involuntary movements are noted. Skin:  Warm and dry. Good color, turgor and pigmentation. No lesions or scars.   No cyanosis or clubbing  Neurological/Psychiatric: The patient's general behavior, level of consciousness, thought content and emotional status is normal.          INVESTIGATIONS     Laboratory Testing:     Recent Results (from the past 24 hour(s))   EKG 12 Lead    Collection Time: 21 1:39 PM   Result Value Ref Range    Ventricular Rate 105 BPM    Atrial Rate 105 BPM    P-R Interval 148 ms    QRS Duration 94 ms    Q-T Interval 316 ms    QTc Calculation (Bazett) 417 ms    P Axis 14 degrees    R Axis 2 degrees    T Axis -98 degrees   Venous Blood Gas, POC    Collection Time: 04/19/21  1:45 PM   Result Value Ref Range    pH, Norman 7.407 7.320 - 7.430    pCO2, Norman 45.9 41.0 - 51.0 mm Hg    pO2, Norman 32.1 30 - 50 mm Hg    HCO3, Venous 28.9 22.0 - 29.0 mmol/L    Total CO2, Venous 30 23.0 - 30.0 mmol/L    Negative Base Excess, Norman NOT REPORTED 0.0 - 2.0    Positive Base Excess, Norman 3 0.0 - 3.0    O2 Sat, Norman 62 60.0 - 85.0 %    O2 Device/Flow/% NOT REPORTED     Lg Test NOT REPORTED     Sample Site NOT REPORTED     Mode NOT REPORTED     FIO2 NOT REPORTED     Pt Temp NOT REPORTED     POC pH Temp NOT REPORTED     POC pCO2 Temp NOT REPORTED mm Hg    POC pO2 Temp NOT REPORTED mm Hg   Hemoglobin and hematocrit, blood    Collection Time: 04/19/21  1:45 PM   Result Value Ref Range    POC Hemoglobin 17.9 (H) 13.5 - 17.5 g/dL    POC Hematocrit 53 41 - 53 %   Creatinine W/GFR Point of Care    Collection Time: 04/19/21  1:45 PM   Result Value Ref Range    POC Creatinine 1.11 0.51 - 1.19 mg/dL    GFR Comment >60 >60 mL/min    GFR Non-African American >60 >60 mL/min    GFR Comment         SODIUM (POC)    Collection Time: 04/19/21  1:45 PM   Result Value Ref Range    POC Sodium 142 138 - 146 mmol/L   POTASSIUM (POC)    Collection Time: 04/19/21  1:45 PM   Result Value Ref Range    POC Potassium 3.8 3.5 - 4.5 mmol/L   CHLORIDE (POC)    Collection Time: 04/19/21  1:45 PM   Result Value Ref Range    POC Chloride 105 98 - 107 mmol/L   CALCIUM, IONIC (POC)    Collection Time: 04/19/21  1:45 PM   Result Value Ref Range    POC Ionized Calcium 1.12 (L) 1.15 - 1.33 mmol/L   Lactic Acid, POC    Collection Time: 04/19/21  1:45 PM   Result Value Ref Range    POC Lactic Acid 1.51 (H) 0.56 - 1.39 mmol/L   POCT Glucose ng/mL    Troponin Interp NOT REPORTED    TSH with Reflex    Collection Time: 04/19/21  2:00 PM   Result Value Ref Range    TSH 1.35 0.30 - 5.00 mIU/L   TOX SCR, BLD, ED    Collection Time: 04/19/21  2:00 PM   Result Value Ref Range    Acetaminophen Level <5 (L) 10 - 30 ug/mL    Ethanol <10 <10 mg/dL    Ethanol percent <5.662 <3.747 %    Salicylate Lvl <1 (L) 3 - 10 mg/dL    Toxic Tricyclic Sc,Blood NEGATIVE NEGATIVE   Carboxyhemoglobin    Collection Time: 04/19/21  7:11 PM   Result Value Ref Range    Carboxyhemoglobin 2.1 0 - 5 %   TOX SCR, COMPLETE BL    Collection Time: 04/19/21  8:00 PM   Result Value Ref Range    Acetaminophen Level PENDING ug/mL    THC NO SAMPLE RECEIVED     Cocaine NO SAMPLE RECEIVED     Opiates NO SAMPLE RECEIVED     Phencyclidine NO SAMPLE RECEIVED     Amphetamine NO SAMPLE RECEIVED     Barbiturates NO SAMPLE RECEIVED     Benzodiazepines NO SAMPLE RECEIVED     Methadone NO SAMPLE RECEIVED     Oxycodone NO SAMPLE RECEIVED     Methamphetamine NO SAMPLE RECEIVED     Buprenorphine NO SAMPLE RECEIVED     Drugs of Abuse Comment NO SAMPLE RECEIVED     Ethanol PENDING mg/dL    Ethanol percent PENDING %    Salicylate Lvl PENDING mg/dL    Toxic Tricyclic Sc,Blood PENDING    CBC    Collection Time: 04/20/21  5:02 AM   Result Value Ref Range    WBC 8.2 3.5 - 11.3 k/uL    RBC 5.95 (H) 4.21 - 5.77 m/uL    Hemoglobin 16.3 13.0 - 17.0 g/dL    Hematocrit 50.5 (H) 40.7 - 50.3 %    MCV 84.9 82.6 - 102.9 fL    MCH 27.4 25.2 - 33.5 pg    MCHC 32.3 28.4 - 34.8 g/dL    RDW 13.3 11.8 - 14.4 %    Platelets 597 030 - 164 k/uL    MPV 9.4 8.1 - 13.5 fL    NRBC Automated 0.0 0.0 per 100 WBC   Hemoglobin A1c    Collection Time: 04/20/21  5:02 AM   Result Value Ref Range    Hemoglobin A1C 5.5 4.0 - 6.0 %    Estimated Avg Glucose 111 mg/dL   Lipid panel - fasting    Collection Time: 04/20/21  5:02 AM   Result Value Ref Range    Cholesterol 165 <200 mg/dL    HDL 37 (L) >40 mg/dL    LDL Cholesterol 86 0 - 130 mg/dL Chol/HDL Ratio 4.5 <5    Triglycerides 212 (H) <150 mg/dL    VLDL NOT REPORTED (H) 1 - 30 mg/dL       Imaging:   Ct Head Wo Contrast    Result Date: 4/19/2021  No acute intracranial abnormality. Mild chronic microvascular disease. Findings reported to Dr. Octavio Cash at 2:23 p.m. on April 19, 2021. Xr Chest Portable    Result Date: 4/19/2021  No acute cardiopulmonary pathology. Cta Head Neck W Contrast    Result Date: 4/19/2021  No acute abnormality or flow-limiting stenosis of the major arteries of the head and neck. ASSESSMENT & PLAN     ASSESSMENT / PLAN:     Thank you for allowing us to see Doug Burroughs in consultation for management of high blood pressure in the setting of ischemic stroke vs TIA vs seizures. Hypertensive emergency  -Allow permissive hypertension with SBP titrated to not less than 180 for 24 hours (goal -220)  -Discontinue clevidipine drip, current , discussed with RN in the ED  -CT head, CTA head reviewed. -Follow-up on MRI brain, stroke work-up  -After 24 hours, will start on lisinopril for good long-term management.  -Follow-up on echo, blood tox  -TSH within normal limits  -EKG reviewed -sinus rhythm, no acute ST-T wave changes noted. Cristine Vasques MD  Internal Medicine Resident, PGY-1  [facility];  [place], [place]  4/20/2021, 12:12 PM

## 2021-04-20 NOTE — ED NOTES
Pt resting in bed, NAD noted rr even and non labored. Bed locked in lowest position, environment free of clutter. Call light within reach, will continue to monitor.        Galina Collazo RN  04/20/21 1061

## 2021-04-20 NOTE — DISCHARGE SUMMARY
Neurology Discharge Summary     Patient Identification:  Racheal Loving is a 56 y.o. male. :  1960  Admit Date:  2021  Discharge date and time: 2021  7:01 PM   Attending Provider: LUIS ALBERTO Conway MD  Account Number: [de-identified]                                   Admission Diagnoses:   Stroke of unknown etiology [facility]) [I63.9]  Stroke of unknown etiology [facility]) [I63.9]    Discharge Diagnoses:  Principal Problem:    TIA (transient ischemic attack)  Active Problems:    Cerebrovascular accident (CVA) ([address]    Hypertensive urgency  Resolved Problems:    * No resolved hospital problems. *      Discharge Medications:    Discharge Medication List as of 2021  6:44 PM           Details   clopidogrel (PLAVIX) 75 MG tablet Take 1 tablet by mouth daily for 19 days, Disp-19 tablet, R-0Normal              Details   atorvastatin (LIPITOR) 40 MG tablet Take 1 tablet by mouth nightly, Disp-30 tablet, R-3Normal              Details   aspirin 81 MG chewable tablet Take 81 mg by mouth dailyHistorical Med      lisinopril (PRINIVIL;ZESTRIL) 10 MG tablet TK 1 T PO DAILYHistorical Med      metoprolol tartrate (LOPRESSOR) 25 MG tablet TK ONE-HALF T BID POHistorical Med           Discharge Medication List as of 2021  6:44 PM            Consults:   Internal Medicine    Hospital Course:  56 y.o. male admitted on 2021 with past medical history of hypertension, hyperlipidemia, CAD status post stent placement about 20 years ago who reports not taking home medications for at least a few months if not more. He presented after experiencing word finding difficulty or difficulty expressing his thoughts. This lasted for about 30 to 45 minutes. No other associated symptoms or weakness. Was seen as a stroke alert and had a NIHSS of 0  CT head did not show any acute abnormality however did have mild chronic microvascular disease. CTA head and neck showed no acute abnormality or flow-limiting stenosis.   MRI unable to be obtained due to unknown compatibility of cardiac stents and patient does not have stent card. Repeat CT Head done post 24 hrs with no acute abnormality  2D Echo completed: EF 60% and negative bubble study  A1c 5.5, LDL 86  Placed on ASA and Plavix for 21 days followed by monotherapy of ASA 81mg thereafter. Also resumed on atorvastatin 40mg daily  Refilled BP medications of lisinopril 10mg and lopressor 12.5mg BID  Worked with therapies    Significant Diagnostics:   As above    Discharge Condition:  Stable    Disposition:   home    Patient Instructions: Take ASA 81mg and Plavix 75mg for total 21 days duration followed by ASA 81 monotherapy. Follow up: Follow-up With  Details  Why  Contact Info   Chano Patrick MD  Go on 6/2/2021  Neurology follow up @ 2:30PM  [facility] [address]   Gregg King MD  Schedule an appointment as soon as possible for a visit in 2 weeks  follow up of CAD and HTN  [address] [phone]       Time Spent on discharge is  33 mins in patient examination, evaluation, counseling as well as medication reconciliation, prescriptions for required medications, discharge plan and follow up.     Kayleigh Graves MD  Neurology PGY-2  04/20/21

## 2021-04-20 NOTE — PROGRESS NOTES
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 No strength deficits were observed. Since patient negotiates ladders and requires independent gait for return to work, will plan to treat for stair negotiation and gait while in acute care. Prognosis: Good  Decision Making: Low Complexity  Clinical Presentation: stable  PT Education: Goals;Gait Training;Plan of Care;General Safety; Functional Mobility Training  REQUIRES PT FOLLOW UP: Yes  Activity Tolerance  Activity Tolerance: Patient Tolerated treatment well       Patient Diagnosis(es): The encounter diagnosis was Cerebrovascular accident (CVA), unspecified mechanism ([address] has a past medical history of CAD (coronary artery disease), Hyperlipidemia, and Hypertension. has a past surgical history that includes Cardiac surgery; Appendectomy; and Tonsillectomy.     Restrictions  Restrictions/Precautions  Restrictions/Precautions: Up as Tolerated  Position Activity Restriction  Other position/activity restrictions: SBP goal 180-220  Vision/Hearing  Vision: Within Functional Limits(Denies any visual field cuts or blurriness.)     Subjective  General  Chart Reviewed: Yes  Patient assessed for rehabilitation services?: Yes  Family / Caregiver Present: No  Follows Commands: Within Functional Limits  Subjective  Subjective: Agreeable to PT eval  Pain Screening  Patient Currently in Pain: Denies

## 2021-04-22 NOTE — CARE COORDINATION
..Stroke Follow Up Phone Call    Called phone number on record - No answer.      []Attempt #1    [x]Attempt #2 (final attempt)      Electronically signed by Josh Helms RN on 4/22/21 at 2:21 PM EDT

## 2021-09-13 ENCOUNTER — HOSPITAL ENCOUNTER (INPATIENT)
Dept: CARDIAC CATH/INVASIVE PROCEDURES | Age: 61
LOS: 1 days | Discharge: HOME | DRG: 247 | End: 2021-09-14
Attending: INTERNAL MEDICINE | Admitting: INTERNAL MEDICINE
Payer: COMMERCIAL

## 2021-09-13 ENCOUNTER — HOSPITAL ENCOUNTER (OUTPATIENT)
Dept: CT IMAGING | Age: 61
Discharge: HOME | DRG: 247 | End: 2021-09-15
Attending: INTERNAL MEDICINE
Payer: COMMERCIAL

## 2021-09-13 DIAGNOSIS — I25.10 CAD IN NATIVE ARTERY: Primary | ICD-10-CM

## 2021-09-13 LAB
ACTIVATED CLOTTING TIME: 239 SEC (ref 79–149)
CHOLESTEROL/HDL RATIO: 4.1
CHOLESTEROL: 152 MG/DL
GFR NON-AFRICAN AMERICAN: >60 ML/MIN
GFR SERPL CREATININE-BSD FRML MDRD: >60 ML/MIN
GFR SERPL CREATININE-BSD FRML MDRD: NORMAL ML/MIN/{1.73_M2}
GLUCOSE BLD-MCNC: 119 MG/DL (ref 74–100)
HDLC SERPL-MCNC: 37 MG/DL
LDL CHOLESTEROL: 90 MG/DL (ref 0–130)
PLATELET # BLD: 200 K/UL (ref 138–453)
POC BUN: 15 MG/DL (ref 8–26)
POC CHLORIDE: 106 MMOL/L (ref 98–107)
POC CREATININE: 0.85 MG/DL (ref 0.51–1.19)
POC HEMATOCRIT: 48 % (ref 41–53)
POC HEMOGLOBIN: 16.2 G/DL (ref 13.5–17.5)
POC POTASSIUM: 4.4 MMOL/L (ref 3.5–4.5)
POC SODIUM: 143 MMOL/L (ref 138–146)
TRIGL SERPL-MCNC: 125 MG/DL
VLDLC SERPL CALC-MCNC: ABNORMAL MG/DL (ref 1–30)

## 2021-09-13 PROCEDURE — 84295 ASSAY OF SERUM SODIUM: CPT

## 2021-09-13 PROCEDURE — 83036 HEMOGLOBIN GLYCOSYLATED A1C: CPT

## 2021-09-13 PROCEDURE — 36415 COLL VENOUS BLD VENIPUNCTURE: CPT

## 2021-09-13 PROCEDURE — 93458 L HRT ARTERY/VENTRICLE ANGIO: CPT | Performed by: INTERNAL MEDICINE

## 2021-09-13 PROCEDURE — 6360000002 HC RX W HCPCS

## 2021-09-13 PROCEDURE — 6360000004 HC RX CONTRAST MEDICATION

## 2021-09-13 PROCEDURE — C1874 STENT, COATED/COV W/DEL SYS: HCPCS

## 2021-09-13 PROCEDURE — 82947 ASSAY GLUCOSE BLOOD QUANT: CPT

## 2021-09-13 PROCEDURE — 6370000000 HC RX 637 (ALT 250 FOR IP): Performed by: STUDENT IN AN ORGANIZED HEALTH CARE EDUCATION/TRAINING PROGRAM

## 2021-09-13 PROCEDURE — 6370000000 HC RX 637 (ALT 250 FOR IP)

## 2021-09-13 PROCEDURE — 82565 ASSAY OF CREATININE: CPT

## 2021-09-13 PROCEDURE — 7100000001 HC PACU RECOVERY - ADDTL 15 MIN

## 2021-09-13 PROCEDURE — 6360000002 HC RX W HCPCS: Performed by: STUDENT IN AN ORGANIZED HEALTH CARE EDUCATION/TRAINING PROGRAM

## 2021-09-13 PROCEDURE — C1725 CATH, TRANSLUMIN NON-LASER: HCPCS

## 2021-09-13 PROCEDURE — 93005 ELECTROCARDIOGRAM TRACING: CPT | Performed by: STUDENT IN AN ORGANIZED HEALTH CARE EDUCATION/TRAINING PROGRAM

## 2021-09-13 PROCEDURE — 4A023N8 MEASUREMENT OF CARDIAC SAMPLING AND PRESSURE, BILATERAL, PERCUTANEOUS APPROACH: ICD-10-PCS | Performed by: INTERNAL MEDICINE

## 2021-09-13 PROCEDURE — 82435 ASSAY OF BLOOD CHLORIDE: CPT

## 2021-09-13 PROCEDURE — C1760 CLOSURE DEV, VASC: HCPCS

## 2021-09-13 PROCEDURE — 85049 AUTOMATED PLATELET COUNT: CPT

## 2021-09-13 PROCEDURE — 80061 LIPID PANEL: CPT

## 2021-09-13 PROCEDURE — C1894 INTRO/SHEATH, NON-LASER: HCPCS

## 2021-09-13 PROCEDURE — C1887 CATHETER, GUIDING: HCPCS

## 2021-09-13 PROCEDURE — 7100000000 HC PACU RECOVERY - FIRST 15 MIN

## 2021-09-13 PROCEDURE — 1200000000 HC SEMI PRIVATE

## 2021-09-13 PROCEDURE — 99223 1ST HOSP IP/OBS HIGH 75: CPT | Performed by: PSYCHIATRY & NEUROLOGY

## 2021-09-13 PROCEDURE — 85347 COAGULATION TIME ACTIVATED: CPT

## 2021-09-13 PROCEDURE — 2709999900 HC NON-CHARGEABLE SUPPLY

## 2021-09-13 PROCEDURE — 84132 ASSAY OF SERUM POTASSIUM: CPT

## 2021-09-13 PROCEDURE — 2500000003 HC RX 250 WO HCPCS

## 2021-09-13 PROCEDURE — 2580000003 HC RX 258: Performed by: INTERNAL MEDICINE

## 2021-09-13 PROCEDURE — B2151ZZ FLUOROSCOPY OF LEFT HEART USING LOW OSMOLAR CONTRAST: ICD-10-PCS | Performed by: INTERNAL MEDICINE

## 2021-09-13 PROCEDURE — 84520 ASSAY OF UREA NITROGEN: CPT

## 2021-09-13 PROCEDURE — 027035Z DILATION OF CORONARY ARTERY, ONE ARTERY WITH TWO DRUG-ELUTING INTRALUMINAL DEVICES, PERCUTANEOUS APPROACH: ICD-10-PCS | Performed by: INTERNAL MEDICINE

## 2021-09-13 PROCEDURE — 92928 PRQ TCAT PLMT NTRAC ST 1 LES: CPT | Performed by: INTERNAL MEDICINE

## 2021-09-13 PROCEDURE — C1769 GUIDE WIRE: HCPCS

## 2021-09-13 PROCEDURE — 70450 CT HEAD/BRAIN W/O DYE: CPT

## 2021-09-13 PROCEDURE — 85014 HEMATOCRIT: CPT

## 2021-09-13 PROCEDURE — B2111ZZ FLUOROSCOPY OF MULTIPLE CORONARY ARTERIES USING LOW OSMOLAR CONTRAST: ICD-10-PCS | Performed by: INTERNAL MEDICINE

## 2021-09-13 RX ORDER — SODIUM CHLORIDE 9 MG/ML
INJECTION, SOLUTION INTRAVENOUS CONTINUOUS
Status: DISCONTINUED | OUTPATIENT
Start: 2021-09-13 | End: 2021-09-14 | Stop reason: HOSPADM

## 2021-09-13 RX ORDER — SODIUM CHLORIDE 9 MG/ML
25 INJECTION, SOLUTION INTRAVENOUS PRN
Status: DISCONTINUED | OUTPATIENT
Start: 2021-09-13 | End: 2021-09-14 | Stop reason: HOSPADM

## 2021-09-13 RX ORDER — LOSARTAN POTASSIUM 50 MG/1
50 TABLET ORAL DAILY
Status: DISCONTINUED | OUTPATIENT
Start: 2021-09-13 | End: 2021-09-14 | Stop reason: HOSPADM

## 2021-09-13 RX ORDER — ASPIRIN 81 MG/1
81 TABLET, CHEWABLE ORAL DAILY
Status: DISCONTINUED | OUTPATIENT
Start: 2021-09-13 | End: 2021-09-14 | Stop reason: HOSPADM

## 2021-09-13 RX ORDER — HYDRALAZINE HYDROCHLORIDE 20 MG/ML
10 INJECTION INTRAMUSCULAR; INTRAVENOUS ONCE
Status: COMPLETED | OUTPATIENT
Start: 2021-09-13 | End: 2021-09-13

## 2021-09-13 RX ORDER — LOSARTAN POTASSIUM 50 MG/1
50 TABLET ORAL DAILY
COMMUNITY

## 2021-09-13 RX ORDER — HYDRALAZINE HYDROCHLORIDE 20 MG/ML
10 INJECTION INTRAMUSCULAR; INTRAVENOUS EVERY 6 HOURS PRN
Status: DISCONTINUED | OUTPATIENT
Start: 2021-09-13 | End: 2021-09-14 | Stop reason: HOSPADM

## 2021-09-13 RX ORDER — ATORVASTATIN CALCIUM 40 MG/1
40 TABLET, FILM COATED ORAL NIGHTLY
Status: DISCONTINUED | OUTPATIENT
Start: 2021-09-13 | End: 2021-09-14

## 2021-09-13 RX ORDER — SODIUM CHLORIDE 0.9 % (FLUSH) 0.9 %
5-40 SYRINGE (ML) INJECTION PRN
Status: DISCONTINUED | OUTPATIENT
Start: 2021-09-13 | End: 2021-09-14 | Stop reason: HOSPADM

## 2021-09-13 RX ORDER — ACETAMINOPHEN 325 MG/1
650 TABLET ORAL EVERY 4 HOURS PRN
Status: DISCONTINUED | OUTPATIENT
Start: 2021-09-13 | End: 2021-09-14 | Stop reason: HOSPADM

## 2021-09-13 RX ORDER — SODIUM CHLORIDE 0.9 % (FLUSH) 0.9 %
5-40 SYRINGE (ML) INJECTION EVERY 12 HOURS SCHEDULED
Status: DISCONTINUED | OUTPATIENT
Start: 2021-09-13 | End: 2021-09-14 | Stop reason: HOSPADM

## 2021-09-13 RX ADMIN — HYDRALAZINE HYDROCHLORIDE 10 MG: 20 INJECTION, SOLUTION INTRAMUSCULAR; INTRAVENOUS at 13:18

## 2021-09-13 RX ADMIN — DESMOPRESSIN ACETATE 40 MG: 0.2 TABLET ORAL at 21:00

## 2021-09-13 RX ADMIN — SODIUM CHLORIDE: 9 INJECTION, SOLUTION INTRAVENOUS at 08:26

## 2021-09-13 RX ADMIN — ACETAMINOPHEN 650 MG: 325 TABLET ORAL at 18:27

## 2021-09-13 RX ADMIN — ACETAMINOPHEN 650 MG: 325 TABLET ORAL at 12:56

## 2021-09-13 RX ADMIN — METOPROLOL TARTRATE 12.5 MG: 25 TABLET ORAL at 21:05

## 2021-09-13 RX ADMIN — LOSARTAN POTASSIUM 50 MG: 50 TABLET, FILM COATED ORAL at 21:06

## 2021-09-13 ASSESSMENT — PAIN SCALES - GENERAL
PAINLEVEL_OUTOF10: 6
PAINLEVEL_OUTOF10: 0
PAINLEVEL_OUTOF10: 7

## 2021-09-13 NOTE — H&P
[facility]    Consult / H&P               Today's Date: 9/13/2021  Patient Name: Norma Yoo  Date of admission: 9/13/2021  7:40 AM  Patient's age: 59 y.o., 1960  Admission Dx: Abnormal stress ECG [R94.39]    Reason for Consult:  Cardiac evaluation    Requesting Physician: No admitting provider for patient encounter. CHIEF COMPLAINT:  DANIEL     History Obtained From:  patient    HISTORY OF PRESENT ILLNESS:      58 yo presented with SOB   Had stress test done in office and was abnormal.   Has h/o PCI to LAD in past     Past Medical History:   has a past medical history of CAD (coronary artery disease), Cerebral artery occlusion with cerebral infarction ([facility] Hyperlipidemia, and Hypertension. Past Surgical History:   has a past surgical history that includes Appendectomy; Tonsillectomy; Cardiac surgery; and Cardiac catheterization (09/13/2021). Home Medications:    Prior to Admission medications    Medication Sig Start Date End Date Taking? Authorizing Provider   atorvastatin (LIPITOR) 40 MG tablet Take 1 tablet by mouth nightly 4/20/21  Yes Alyssia Yates MD   aspirin 81 MG chewable tablet Take 1 tablet by mouth daily 4/20/21  Yes Carolyn Rivera MD   metoprolol tartrate (LOPRESSOR) 25 MG tablet Take 0.5 tablets by mouth 2 times daily 4/20/21  Yes Carolyn Rivera MD      Current Facility-Administered Medications: 0.9 % sodium chloride infusion, , IntraVENous, Continuous    Allergies:  Patient has no known allergies. Social History:   reports that he has quit smoking. He has never used smokeless tobacco. He reports current alcohol use. Family History: family history is not on file. No h/o sudden cardiac death. No for premature CAD    REVIEW OF SYSTEMS:    Constitutional: there has been no unanticipated weight loss. There's been No change in energy level, No change in activity level. Eyes: No visual changes or diplopia. No scleral icterus.   ENT: No Diagnosis    Cerebrovascular accident (CVA) ([facility] [address] [facility]    Hypertensive urgency    TIA (transient ischemic attack)       1. CAD with PTCA and stent of the LAD in 2002. 2. Repeat heart catheterization in 2005 suggesting patent stent of the LAD, also required PTCA  and stent of the RCA. 3. LV dysfunction with EF about 35%. 4. Hypertension. 5. Hyperlipidemia. 6. Stress test in May 2011 suggesting Mild anterior ischemia, with infero - apical infarction, EF 36%. 7. Stress test April 2017 revealing moderate apical transmural infarct with apical hypokinesis and EF of 41%. 8. Echocardiogram 4/2021: preserved LV function, EF 50%, mild MR.  9. TIA in 4/2021. Intermediate Risk (1-3% annual death or MI)  Impression 1. Moderate sized/intensity apical infarct with mild per infarct ischemia (mixed defect). 2. Moderately reduced LV function with EF 35%, with global hypokinesis. 1. Abnormal stress test   2. CAD s/p PCI to LAD   3. Systolic dysdunction    RECOMMENDATIONS:  Proceed with cardiac cath  Follow post cath orders    Pre Procedure Conscious Sedation Data:  ASA Class:    [] I [] II [] III [] IV    Mallampati Class:  [x] I [] II [] III [] IV      Need cardiac cath for risk stratification. Risk and benefits of cardiac catheterization were discussed in detail. Risk of bleeding, requiring blood transfusion, vascular complication requiring surgery, renal insufficieny with need of dialysis, CVA, MI, death and anesthesia complications including intubation were discussed. Patient agrees to proceed and verbalizes understanding. Discussed with patient and Nurse. Electronically signed by Mercedes Vail MD on 9/13/2021 at 8:31 AM    [facility]      [phone]      I reviewed the patient history personally, and examined by me during the visit. I have reviewed the H&P/Consult / Progress note as completed, and made appropriate changes to the patient exam and treatment plans.       I have reviewed the case in details including physical exam and treatment plan with resident / fellow / NP    Patient treatment plan was explained to patient, correction in notes was made as appropriate, and discussed final arrangement based on  my evaluation and exam.    Additional Recommendations:      Carline Trent MD  [facility]

## 2021-09-13 NOTE — CONSULTS
Department of Neurology                                         Resident Consult Note                      Reason for Consult:  Word finding difficulty   Requesting Physician:  Dr. Michael Peña MD  History Obtained From:  patient, spouse, electronic medical record    CHIEF COMPLAINT:       Word finding difficulty     HISTORY OF PRESENT ILLNESS:       The patient is a 59 y.o. male pmh coronary artery disease s/p multiple stents in the past, also electively admitted for cardiac cath today, s/p 2 RCA stents this morning. History of TIA with word finding difficulty in April, CT CTA unremarkable and unable to get MRI brain due to old stents. Supposed to be on antiplatelets at home, but saying last few weeks has not been taking them. Last well-known 3 PM, when nursing wife noticed patient was having some word find difficulty. Patient also endorsed he feels out of breath at that time especially when dozing off and waking up short of breath, possible sleep apnea already undergoing evaluation. Word finding difficulty resolved in 10 to 15 minutes. No other focal neurological signs or symptoms. Patient was loaded with Brilinta 180 mg today after cardiac cath. NIH Stroke Scale Total 0        ABCD2 Score  (Estimate Risk of Stroke after TIA)   POINTS   Age    < 60   ? 60     [] 0  [x] 1   BP:     SBP <140 or DBP < 90   SBP ? 140 or DBP ? 90       [] 0  [x] 1   Clinical Features of TIA     Other Symptoms                 Speech Disturbances W/O Weakness   Unilateral Weakness     [] 0  [x] 1  [] 2   Duration of symptoms     < 10 Minutes                                     10-59 Minutes   ?  61 Minutes     [] 0  [x] 1  [] 2   Diabetes     No                    Yes     [] 0  [x] 1   TOTAL 5   0-3 Points: Low Risk -> Work up could be done OPD   2-Day Stroke Risk: 1%   7- Day Stroke Risk: 1.2%   90 Days Stroke Risk: 3.1%    4-5 Points: Moderate Risk    2-Day Stroke Risk: 4.1%   7- Day Stroke Risk: 5. 9%    90 Days Stroke Risk: 9.8%    6-7 Points: High Risk -> Warrant Admission for Workup   2-Day Stroke Risk: 8.1%   7- Day Stroke Risk: 11.7%   90 Days Stroke Risk: 17.8%        Modified [place] Score Scale:     [x] Zero: No symptoms at all   [] 1: No significant disability despite symptoms; able to carry out all usual duties and activities   [] 2: Slight disability; unable to carry out all previous activities, but able to look after own affairs without assistance   [] 3:Moderate disability; requiring some help, but able to walk without assistance   [] 4: Moderately severe disability; unable to walk and attend to bodily needs without assistance   [] 5:Severe disability; bedridden, incontinent and requiring constant nursing care and attention         PAST MEDICAL HISTORY :       Past Medical History:        Diagnosis Date    CAD (coronary artery disease)     Cerebral artery occlusion with cerebral infarction ([facility] [address] 04/2021    TIA    Hyperlipidemia     Hypertension        Past Surgical History:        Procedure Laterality Date    APPENDECTOMY      CARDIAC CATHETERIZATION  09/13/2021    LORAINE MID RCA LORIANE DISTAL RCA    CARDIAC SURGERY      4 stents placed in 2002    TONSILLECTOMY      as a child       Social History:   Social History     Socioeconomic History    Marital status:      Spouse name: Not on file    Number of children: Not on file    Years of education: Not on file    Highest education level: Not on file   Occupational History    Not on file   Tobacco Use    Smoking status: Former Smoker    Smokeless tobacco: Never Used   Vaping Use    Vaping Use: Never assessed   Substance and Sexual Activity    Alcohol use: Yes     Comment: OCCAISIONALLY    Drug use: Not on file    Sexual activity: Not on file   Other Topics Concern    Not on file   Social History Narrative    Not on file     Social Determinants of Health     Financial Resource Strain:     Difficulty of Paying Living Expenses: Food Insecurity:     Worried About Running Out of Food in the Last Year:     [address] in the Last Year:    Transportation Needs:     Lack of Transportation (Medical):  Lack of Transportation (Non-Medical):    Physical Activity:     Days of Exercise per Week:     Minutes of Exercise per Session:    Stress:     Feeling of Stress :    Social Connections:     Frequency of Communication with Friends and Family:     Frequency of Social Gatherings with Friends and Family:     Attends Mu-ism Services:     Active Member of Clubs or Organizations:     Attends Club or Organization Meetings:     Marital Status:    Intimate Partner Violence:     Fear of Current or Ex-Partner:     Emotionally Abused:     Physically Abused:     Sexually Abused:        Family History:   History reviewed. No pertinent family history. Allergies:  Patient has no known allergies. Home Medications:  Prior to Admission medications    Medication Sig Start Date End Date Taking?  Authorizing Provider   losartan (COZAAR) 50 MG tablet Take 50 mg by mouth daily   Yes Historical Provider, MD   atorvastatin (LIPITOR) 40 MG tablet Take 1 tablet by mouth nightly 4/20/21  Yes Lesa Lui MD   aspirin 81 MG chewable tablet Take 1 tablet by mouth daily 4/20/21  Yes Florida Barnes MD   metoprolol tartrate (LOPRESSOR) 25 MG tablet Take 0.5 tablets by mouth 2 times daily 4/20/21  Yes Florida Barnes MD       Current Medications:   Current Facility-Administered Medications: 0.9 % sodium chloride infusion, , IntraVENous, Continuous  acetaminophen (TYLENOL) tablet 650 mg, 650 mg, Oral, Q4H PRN    REVIEW OF SYSTEMS:       CONSTITUTIONAL:  Positive for fatigue and malaise   EYES: negative for double vision and photophobia    HEENT: negative for tinnitus and sore throat   RESPIRATORY:  Positive for shortness of breath   CARDIOVASCULAR: negative for chest pain, palpitations   GASTROINTESTINAL: negative for nausea, vomiting GENITOURINARY: negative for incontinence   MUSCULOSKELETAL: negative for neck or back pain   NEUROLOGICAL: negative for seizures   PSYCHIATRIC:  Positive for fatigue     Review of systems otherwise negative. PHYSICAL EXAM:       BP (!) 168/111   Pulse 83   Temp 98.4 °F (36.9 °C) (Oral)   Resp 16   Ht 6' 1\" (1.854 m)   Wt 287 lb (130.2 kg)   SpO2 98%   BMI 37.87 kg/m²     CONSTITUTIONAL:  Well developed, well nourished, alert and oriented x 3, in no acute distress. GCS 15, nontoxic. No dysarthria, no aphasia. EOMI.     HEAD:  normocephalic, atraumatic    EYES:  PERRLA, EOMI.   ENT:  moist mucous membranes   NECK:  supple, symmetric, no midline tenderness to palpation    BACK:  without midline tenderness, step-offs or deformities    LUNGS:  Equal air entry bilaterally   CARDIOVASCULAR:  normal s1 / s2   ABDOMEN:  Soft, no rigidity   NEUROLOGIC:  Mental Status:  A & O x3,awake             Cranial Nerves:    cranial nerves II-XII are grossly intact    Motor Exam:    Drift:  absent  Tone:  normal    Motor exam is symmetrical 5 out of 5 all extremities bilaterally    Sensory:    Touch:    Right Upper Extremity:  normal  Left Upper Extremity:  normal  Right Lower Extremity:  normal  Left Lower Extremity:  normal    Deep Tendon Reflexes:    Right Bicep:  1+  Left Bicep:  1+  Right Knee:  1+  Left Knee:  1+    Plantar Response:  Right:  downgoing  Left:  downgoing    Clonus:  absent  Salinas's:  absent    Coordination/Dysmetria:  Heel to Shin:  Right:  normal  Left:  normal  Finger to Nose:   Right:  normal  Left:  normal   Dysdiadochokinesia:  absent       SKIN:  no rash      LABS AND IMAGING:     CBC with Differential:    Lab Results   Component Value Date    WBC 8.2 04/20/2021    RBC 5.95 04/20/2021    HGB 16.3 04/20/2021    HCT 50.5 04/20/2021     09/13/2021    MCV 84.9 04/20/2021    MCH 27.4 04/20/2021    MCHC 32.3 04/20/2021    RDW 13.3 04/20/2021    LYMPHOPCT 40 04/19/2021    MONOPCT 6 04/19/2021 BASOPCT 1 04/19/2021    MONOSABS 0.52 04/19/2021    LYMPHSABS 3.22 04/19/2021    EOSABS 0.14 04/19/2021    BASOSABS 0.05 04/19/2021    DIFFTYPE NOT REPORTED 04/19/2021     BMP:    Lab Results   Component Value Date     04/19/2021    K 3.9 04/19/2021     04/19/2021    CO2 24 04/19/2021    BUN 15 04/19/2021    CREATININE 0.85 09/13/2021    CREATININE 0.76 04/19/2021    CALCIUM 9.7 04/19/2021    GFRAA >60 04/19/2021    LABGLOM >60 09/13/2021    GLUCOSE 113 04/19/2021       Radiology Review:    CT HEAD WO CONTRAST    Result Date: 9/13/2021  EXAMINATION: CT OF THE HEAD WITHOUT CONTRAST  9/13/2021 4:00 pm TECHNIQUE: CT of the head was performed without the administration of intravenous contrast. Dose modulation, iterative reconstruction, and/or weight based adjustment of the mA/kV was utilized to reduce the radiation dose to as low as reasonably achievable. COMPARISON: CT head without contrast, 04/20/2021 in HISTORY: ORDERING SYSTEM PROVIDED HISTORY: stroke alert TECHNOLOGIST PROVIDED HISTORY: stroke alert FINDINGS: BRAIN/VENTRICLES: No mass effect, edema or hemorrhage is seen. Mild volume loss is seen in the cerebrum with mild chronic microvascular ischemic changes. Vertebrobasilar dolichoectasia is noted. No hydrocephalus or extra-axial fluid is seen. ORBITS: The visualized portion of the orbits demonstrate no acute abnormality. SINUSES: Mild mucosal thickening was seen in the anterior ethmoid air 1. The remainder of the visualized paranasal sinuses and the mastoids are grossly clear. SOFT TISSUES/SKULL:  No acute abnormality of the visualized skull or soft tissues. No acute intracranial abnormality.        ASSESSMENT AND PLAN:       Patient Active Problem List   Diagnosis    Cerebrovascular accident (CVA) ([address]    Hypertensive urgency    TIA (transient ischemic attack)    CAD in native artery       59 y.o. male pmh coronary artery disease s/p multiple stents in the past, also electively admitted for cardiac cath today, s/p 2 RCA stents this morning. History of TIA with word finding difficulty in April too, CT CTA unremarkable then and unable to get MRI brain due to old stents. Supposed to be on antiplatelets at home, but saying last few weeks has not been taking them. Last well-known 3 PM, TIA with word finding difficulty lasting 10-15 minutes, back to baseline by the time of eval. ABCD2 score 5. Supposed to now be on asa and brilinta for cardiac stents for 1 year. Appropriate for TIA too, along with lipitor 40mg. - Discussed with Dr. Allie Bonilla. CT head no acute abnormality. - recommend MRI Brain WO if able. - Fasting Lipid panel, hgba1c. Increase lipitor to 80 mg if LDL > 70   - PT, OT, Speech eval    - Telemetry    - Neuro checks per protocol  - We recommend SBP < 200  - Blood glucose goal less than 180  - Please avoid dextrose containing solutions      Thank you for your consult.        Elisabeth Myers MD   9/13/2021  4:53 PM

## 2021-09-13 NOTE — CONSULTS
Department of Endovascular Neurosurgery                                          Resident Consult Note                      Reason for Consult:  Word finding difficulty   Requesting Physician:  Dr. Rigoberto Del Cid MD  Endovascular Neurosurgeon:   [x]Dr. Dee Dee Gonzales  []Dr. Alexa Heredia    History Obtained From:  patient, spouse, electronic medical record    CHIEF COMPLAINT:       Word finding difficulty     HISTORY OF PRESENT ILLNESS:       The patient is a 59 y.o. male pmh coronary artery disease s/p multiple stents in the past, also electively admitted for cardiac cath today, s/p 2 RCA stents this morning. History of TIA with word finding difficulty in April, CT CTA unremarkable and unable to get MRI brain due to old stents. Supposed to be on antiplatelets at home, but saying last few weeks has not been taking them. Last well-known 3 PM, when nursing wife noticed patient was having some word find difficulty. Patient also endorsed he feels out of breath at that time especially when dozing off and waking up short of breath, possible sleep apnea already undergoing evaluation. Word finding difficulty resolved in 10 to 15 minutes. No other focal neurological signs or symptoms. Patient was loaded with Brilinta 180 mg today after cardiac cath. NIH Stroke Scale Total 0        ABCD2 Score  (Estimate Risk of Stroke after TIA)   POINTS   Age    < 60   ? 60     [] 0  [x] 1   BP:     SBP <140 or DBP < 90   SBP ? 140 or DBP ? 90       [] 0  [x] 1   Clinical Features of TIA     Other Symptoms                 Speech Disturbances W/O Weakness   Unilateral Weakness     [] 0  [x] 1  [] 2   Duration of symptoms     < 10 Minutes                                     10-59 Minutes   ?  61 Minutes     [] 0  [x] 1  [] 2   Diabetes     No                    Yes     [] 0  [x] 1   TOTAL 5   0-3 Points: Low Risk -> Work up could be done OPD   2-Day Stroke Risk: 1%   7- Day Stroke Risk: 1.2%   90 Days Stroke Risk: 3.1%    4-5 Points: Moderate Risk    2-Day Stroke Risk: 4.1%   7- Day Stroke Risk: 5.9%    90 Days Stroke Risk: 9.8%    6-7 Points: High Risk -> Warrant Admission for Workup   2-Day Stroke Risk: 8.1%   7- Day Stroke Risk: 11.7%   90 Days Stroke Risk: 17.8%        Modified [place] Score Scale:     [x] Zero: No symptoms at all   [] 1: No significant disability despite symptoms; able to carry out all usual duties and activities   [] 2: Slight disability; unable to carry out all previous activities, but able to look after own affairs without assistance   [] 3:Moderate disability; requiring some help, but able to walk without assistance   [] 4: Moderately severe disability; unable to walk and attend to bodily needs without assistance   [] 5:Severe disability; bedridden, incontinent and requiring constant nursing care and attention         PAST MEDICAL HISTORY :       Past Medical History:        Diagnosis Date    CAD (coronary artery disease)     Cerebral artery occlusion with cerebral infarction ([facility] [address] 04/2021    TIA    Hyperlipidemia     Hypertension        Past Surgical History:        Procedure Laterality Date    APPENDECTOMY      CARDIAC CATHETERIZATION  09/13/2021    LORAINE MID RCA LORAINE DISTAL RCA    CARDIAC SURGERY      4 stents placed in 2002    TONSILLECTOMY      as a child       Social History:   Social History     Socioeconomic History    Marital status:      Spouse name: Not on file    Number of children: Not on file    Years of education: Not on file    Highest education level: Not on file   Occupational History    Not on file   Tobacco Use    Smoking status: Former Smoker    Smokeless tobacco: Never Used   Vaping Use    Vaping Use: Never assessed   Substance and Sexual Activity    Alcohol use: Yes     Comment: OCCAISIONALLY    Drug use: Not on file    Sexual activity: Not on file   Other Topics Concern    Not on file   Social History Narrative    Not on file     Social Determinants of Health palpitations   GASTROINTESTINAL: negative for nausea, vomiting   GENITOURINARY: negative for incontinence   MUSCULOSKELETAL: negative for neck or back pain   NEUROLOGICAL: negative for seizures   PSYCHIATRIC:  Positive for fatigue     Review of systems otherwise negative. PHYSICAL EXAM:       BP (!) 168/111   Pulse 83   Temp 98.4 °F (36.9 °C) (Oral)   Resp 16   Ht 6' 1\" (1.854 m)   Wt 287 lb (130.2 kg)   SpO2 98%   BMI 37.87 kg/m²     CONSTITUTIONAL:  Well developed, well nourished, alert and oriented x 3, in no acute distress. GCS 15, nontoxic. No dysarthria, no aphasia. EOMI.     HEAD:  normocephalic, atraumatic    EYES:  PERRLA, EOMI.   ENT:  moist mucous membranes   NECK:  supple, symmetric, no midline tenderness to palpation    BACK:  without midline tenderness, step-offs or deformities    LUNGS:  Equal air entry bilaterally   CARDIOVASCULAR:  normal s1 / s2   ABDOMEN:  Soft, no rigidity   NEUROLOGIC:  Mental Status:  A & O x3,awake             Cranial Nerves:    cranial nerves II-XII are grossly intact    Motor Exam:    Drift:  absent  Tone:  normal    Motor exam is symmetrical 5 out of 5 all extremities bilaterally    Sensory:    Touch:    Right Upper Extremity:  normal  Left Upper Extremity:  normal  Right Lower Extremity:  normal  Left Lower Extremity:  normal    Deep Tendon Reflexes:    Right Bicep:  1+  Left Bicep:  1+  Right Knee:  1+  Left Knee:  1+    Plantar Response:  Right:  downgoing  Left:  downgoing    Clonus:  absent  Salinas's:  absent    Coordination/Dysmetria:  Heel to Shin:  Right:  normal  Left:  normal  Finger to Nose:   Right:  normal  Left:  normal   Dysdiadochokinesia:  absent       SKIN:  no rash      LABS AND IMAGING:     CBC with Differential:    Lab Results   Component Value Date    WBC 8.2 04/20/2021    RBC 5.95 04/20/2021    HGB 16.3 04/20/2021    HCT 50.5 04/20/2021     09/13/2021    MCV 84.9 04/20/2021    MCH 27.4 04/20/2021    MCHC 32.3 04/20/2021    RDW 13.3 04/20/2021    LYMPHOPCT 40 04/19/2021    MONOPCT 6 04/19/2021    BASOPCT 1 04/19/2021    MONOSABS 0.52 04/19/2021    LYMPHSABS 3.22 04/19/2021    EOSABS 0.14 04/19/2021    BASOSABS 0.05 04/19/2021    DIFFTYPE NOT REPORTED 04/19/2021     BMP:    Lab Results   Component Value Date     04/19/2021    K 3.9 04/19/2021     04/19/2021    CO2 24 04/19/2021    BUN 15 04/19/2021    CREATININE 0.85 09/13/2021    CREATININE 0.76 04/19/2021    CALCIUM 9.7 04/19/2021    GFRAA >60 04/19/2021    LABGLOM >60 09/13/2021    GLUCOSE 113 04/19/2021       Radiology Review:    CT HEAD WO CONTRAST    Result Date: 9/13/2021  EXAMINATION: CT OF THE HEAD WITHOUT CONTRAST  9/13/2021 4:00 pm TECHNIQUE: CT of the head was performed without the administration of intravenous contrast. Dose modulation, iterative reconstruction, and/or weight based adjustment of the mA/kV was utilized to reduce the radiation dose to as low as reasonably achievable. COMPARISON: CT head without contrast, 04/20/2021 in HISTORY: ORDERING SYSTEM PROVIDED HISTORY: stroke alert TECHNOLOGIST PROVIDED HISTORY: stroke alert FINDINGS: BRAIN/VENTRICLES: No mass effect, edema or hemorrhage is seen. Mild volume loss is seen in the cerebrum with mild chronic microvascular ischemic changes. Vertebrobasilar dolichoectasia is noted. No hydrocephalus or extra-axial fluid is seen. ORBITS: The visualized portion of the orbits demonstrate no acute abnormality. SINUSES: Mild mucosal thickening was seen in the anterior ethmoid air 1. The remainder of the visualized paranasal sinuses and the mastoids are grossly clear. SOFT TISSUES/SKULL:  No acute abnormality of the visualized skull or soft tissues. No acute intracranial abnormality.        ASSESSMENT AND PLAN:       Patient Active Problem List   Diagnosis    C[facility]vascular accident (CVA) ([facility] [address]    Hypertensive urgency    TIA (transient ischemic attack)    CAD in native artery       59 y.o. male pmh coronary artery disease s/p multiple stents in the past, also electively admitted for cardiac cath today, s/p 2 RCA stents this morning. History of TIA with word finding difficulty in April too, CT CTA unremarkable then and unable to get MRI brain due to old stents. Supposed to be on antiplatelets at home, but saying last few weeks has not been taking them. Last well-known 3 PM, TIA with word finding difficulty lasting 10-15 minutes, back to baseline by the time of eval. ABCD2 score 5. Supposed to now be on asa and brilinta for cardiac stents for 1 year. Appropriate for TIA too, along with lipitor 40mg. - Discussed with Dr. Susan Lynn. CT head no acute abnormality. - recommend MRI Brain WO if able. - Fasting Lipid panel, hgba1c. Increase lipitor to 80 mg if LDL > 70   - PT, OT, Speech eval    - Telemetry    - Neuro checks per protocol  - We recommend SBP < 200  - Blood glucose goal less than 180  - Please avoid dextrose containing solutions    Additional recommendations may follow    Please contact [facility] with any changes in patients neurologic status. Thank you for your consult.        Trev Arnett MD   9/13/2021  4:53 PM

## 2021-09-13 NOTE — PROGRESS NOTES
Patient admitted, consent signed and questions answered. Patient ready for procedure. Call light to reach with side rails up 2 of 2. Right wrist and bilat groin hairs clipped. Family at bedside with patient. History and physical needed.

## 2021-09-13 NOTE — PROGRESS NOTES
Speech expressive aphasia and saying incorrect words in sentences. Dr Jacqueline Richmond visited. Stroke alert called. Neuro team visits. Alert and oriented x4 Able to move all extremetries x4 . C/o shortness of breath.

## 2021-09-13 NOTE — PROGRESS NOTES
Returned from Ct scan [facility].  Report to [place]

## 2021-09-13 NOTE — OP NOTE
[facility]    CARDIAC CATHETERIZATION    Date:   9/13/2021  Patient name:  Bhavna Acevedo  Date of admission:  9/13/2021  7:40 AM  MRN:   [id number]  YOB: 1960    Operators:  Primary:   Irma Capps MD (Attending Physician)        Procedure performed:     [x] Left Heart Catheterization. [] Graft Angiography. [x] Left Ventriculography. [] Right Heart Catheterization. [] Coronary Angiography. [] Aortic Valve Studies. [] PCI:      [] Other:       Pre Procedure Conscious Sedation Data:  ASA Class:    [] I [] II [x] III [] IV    Mallampati Class:  [x] I [] II [] III [] IV      Indication:  [] STEMI      [x] + Stress test  [] ACS      [] + EKG Changes  [] Non Q MI       [] Significant Risk Factors  [] Recurrent Angina             [] Diabetes Mellitus    [] New LBBB      [] Uncontrolled HTN. [] CHF / Low EF changes     [] Abnormal CTA / Ca Score      Procedure:  Access:  [] Femoral  [x] Radial  artery       [x] Right  [] Left    Procedure: After informed consent was obtained with explanation of the risks and benefits, patient was brought to the cath lab. The access area was prepped and draped in sterile fashion. 1% lidocaine was used for local block. The artery was cannulated with 6  Fr sheath with brisk arterial blood return. The side port was frequently flushed and aspirated with normal saline. Findings:   Cardiac Arteries and Lesion Findings       LMCA: Normal 0% stenosis. LAD: mID IN STENT STENOSIS 40-50%     LCx: Mild irregularities 10-20%. RCA: Mid in stent stenosis 80%   Distal RCA, distal to previous stent 80% stenosis         Lesion on Dist RCA: Mid subsection. 85% stenosis 20 mm length reduced to     0%. Pre procedure YULIET II flow was noted. Post Procedure YULIET III flow     was present. Good runoff was present. The lesion was diagnosed as High     Risk (C). Comments: It was difficult to engage the guide radial approach and     required Femoral approach with guide liner support     Devices used         - Luge Wire 182 cm. Number of passes: 1.         - 6 fr GuideLiner. Number of passes: 3.         - Luge Wire 182 cm. Number of passes: 2.         - ASAHI Prowater 190 cm. Number of passes: 1.         - Trek Balloon 2.5mm x 8mm. 3 inflation(s) to a max pressure of: 23     rose. - Trek Balloon 3.0mm x 20mm. 3 inflation(s) to a max pressure of: 24     rose. - Xience Taya 2.5 x 28 LORAINE. 1 inflation(s) to a max pressure of: 18     rose. Lesion on Mid RCA: Proximal subsection. 85% stenosis 12 mm length reduced     to 0%. Pre procedure YULIET II flow was noted. Post Procedure YULIET III flow     was present. Good runoff was present. The lesion was diagnosed as High     Risk (C). Devices used         - Trek Balloon 2.5mm x 8mm. 4 inflation(s) to a max pressure of: 15     rose. - Trek Balloon 3.0mm x 20mm. 2 inflation(s) to a max pressure of: 14     rose. - Xience Taya 3.5 x 33 LORAINE. 1 inflation(s) to a max pressure of: 20     rose. Coronary Tree        Dominance: Right       LV Analysis   LV function assessed as:Normal.   Ejection Fraction   +----------------------------------------------------------------------+---+   ! Method                                                                ! EF%! +----------------------------------------------------------------------+---+   ! LV gram                                                               !55 !   +----------------------------------------------------------------------+---+     Estimated Blood Loss: 5 mL    ____________________________________________________________________    History and Risk Factors    [x] Hypertension     [] Family history of CAD  [x] Hyperlipidemia     [] Cerebrovascular Disease   [] Prior MI       [] Peripheral Vascular disease   [x] Prior PCI              [] Diabetes Mellitus    [] Left Main PCI. [] Currently on Dialysis. [] Prior CABG. [] Currently smoker. [] Cardiac Arrest outside of healthcare facility. [] Yes    [] No        Witnessed     [] Yes   [] No     Arrest after arrival of EMS  [] Yes   [] No     [] Cardiac Arrest at other Facility. [] Yes   [] No    Pre-Procedure Information. Heart Failure       [] Yes    [x] No        Class  [] I      [] II  [] III    [] IV. New Diagnosis    [] Yes  [] No    HF Type      [] Systolic   [] Diastolic          [] Unknown. Diagnostic Test:   EKG       [] Normal   [x] Abnormal    New antiarrhythmia medications:    [] Yes   [] No   New onset atrial fibrillation / Flutter     [] Yes   [] No   ECG Abnormalities:      [] V. Fib   [] Sherron V. Tach           [] NS V. T   [] New LBBB           [] T. Inv  []  ST dev > 0.5 mm         [] PVC's freq  [] PVC's infrequent    Stress Test Performed:      [x] Yes    [] No     Type:     [] Stress Echo   [] Exercise Stress Test (no imaging)      [x] Stress Nuclear  [] Stress Imaging     Results   [] Negative   [x] Positive        [] Indeterminate  [] Unavailable     If Positive/ Risk / Extent of Ischemia:       [] Low  [x] Intermediate         [] High  [] Unavailable      Cardiac CTA Performed:     [] Yes    [x] No      Results   [] CAD   [] Non obstructive CAD      [] No CAD   [] Uncertain      [] Unknown   [] Structural Disease. Pre Procedure Medications:   [x] Yes    [] No         [x] ASA   [] Beta Blockers      [] Nitrate   [] Ca Channel Blockers      [] Ranolazine   [] Statin       [] Plavix/Others antiplatelets      Electronically signed on 9/13/2021 at 8:55 AM by:    Robbie Tuttle MD  Fellow, [address]      I have reviewed the case / procedure with resident / fellow  I have examined the patient personally  Patient agree with treatment plan as discussed before, final arrangement based on my evaluation and exam.    Risk and benefit of procedure planned were explained in details.     Procedure was performed by me personally, with all aspect of the procedure being done using standard protocol. Note was modified based on my own assessment and treatment.     Hipolito Yañez MD  [facility]

## 2021-09-14 VITALS
HEART RATE: 89 BPM | TEMPERATURE: 98.2 F | BODY MASS INDEX: 38.54 KG/M2 | RESPIRATION RATE: 18 BRPM | OXYGEN SATURATION: 96 % | HEIGHT: 73 IN | DIASTOLIC BLOOD PRESSURE: 47 MMHG | SYSTOLIC BLOOD PRESSURE: 145 MMHG | WEIGHT: 290.79 LBS

## 2021-09-14 LAB
EKG ATRIAL RATE: 79 BPM
EKG P AXIS: -3 DEGREES
EKG P-R INTERVAL: 170 MS
EKG Q-T INTERVAL: 382 MS
EKG QRS DURATION: 96 MS
EKG QTC CALCULATION (BAZETT): 438 MS
EKG R AXIS: -15 DEGREES
EKG T AXIS: 52 DEGREES
EKG VENTRICULAR RATE: 79 BPM
ESTIMATED AVERAGE GLUCOSE: 114 MG/DL
HBA1C MFR BLD: 5.6 % (ref 4–6)

## 2021-09-14 PROCEDURE — 6370000000 HC RX 637 (ALT 250 FOR IP): Performed by: NURSE PRACTITIONER

## 2021-09-14 PROCEDURE — 92523 SPEECH SOUND LANG COMPREHEN: CPT

## 2021-09-14 PROCEDURE — 6370000000 HC RX 637 (ALT 250 FOR IP): Performed by: STUDENT IN AN ORGANIZED HEALTH CARE EDUCATION/TRAINING PROGRAM

## 2021-09-14 PROCEDURE — 2580000003 HC RX 258: Performed by: STUDENT IN AN ORGANIZED HEALTH CARE EDUCATION/TRAINING PROGRAM

## 2021-09-14 PROCEDURE — 99254 IP/OBS CNSLTJ NEW/EST MOD 60: CPT | Performed by: PSYCHIATRY & NEUROLOGY

## 2021-09-14 RX ORDER — ATORVASTATIN CALCIUM 80 MG/1
80 TABLET, FILM COATED ORAL NIGHTLY
Qty: 30 TABLET | Refills: 3 | Status: SHIPPED | OUTPATIENT
Start: 2021-09-14

## 2021-09-14 RX ORDER — ATORVASTATIN CALCIUM 80 MG/1
80 TABLET, FILM COATED ORAL NIGHTLY
Status: DISCONTINUED | OUTPATIENT
Start: 2021-09-14 | End: 2021-09-14 | Stop reason: HOSPADM

## 2021-09-14 RX ORDER — NITROGLYCERIN 0.4 MG/1
0.4 TABLET SUBLINGUAL EVERY 5 MIN PRN
Qty: 25 TABLET | Refills: 3 | Status: SHIPPED | OUTPATIENT
Start: 2021-09-14 | End: 2022-09-21 | Stop reason: ALTCHOICE

## 2021-09-14 RX ORDER — LISINOPRIL 10 MG/1
TABLET ORAL
Qty: 30 TABLET | Refills: 3 | OUTPATIENT
Start: 2021-09-14

## 2021-09-14 RX ADMIN — ASPIRIN 81 MG: 81 TABLET, CHEWABLE ORAL at 09:16

## 2021-09-14 RX ADMIN — METOPROLOL TARTRATE 12.5 MG: 25 TABLET ORAL at 14:49

## 2021-09-14 RX ADMIN — LOSARTAN POTASSIUM 50 MG: 50 TABLET, FILM COATED ORAL at 09:16

## 2021-09-14 RX ADMIN — METOPROLOL TARTRATE 12.5 MG: 25 TABLET ORAL at 09:16

## 2021-09-14 RX ADMIN — SODIUM CHLORIDE, PRESERVATIVE FREE 10 ML: 5 INJECTION INTRAVENOUS at 09:16

## 2021-09-14 RX ADMIN — TICAGRELOR 90 MG: 90 TABLET ORAL at 09:16

## 2021-09-14 NOTE — DISCHARGE SUMMARY
[facility]  Discharge Note                 Name:  Luis Fernando Gonzalez  YOB: 1960  Social Security Number:  [id number]  Medical Record Number:  [id number]    Date of Admission:  9/13/2021  Date of Discharge:  9/14/2021    Admitting physician: Shy Almonte MD    Discharge Attending: NICKI Shaw CNP, CNP  Primary Care Physician: No primary care provider on file. Consultants: Cardiology  Discharge to [address] [place] [address] LIST:  Patient Active Problem List   Diagnosis    C[facility]vascular accident (CVA) ([facility] [address]    Hypertensive urgency    TIA (transient ischemic attack)    CAD in native artery    Expressive aphasia    Transient ischemic attack    History of TIA (transient ischemic attack)         Procedures:cardiac catheterization    HOSPITAL COURSE :           The patient was admitted for: Elective cardiac cath due to abnormal stress test in office, DANIEL and prior history of PCI in past  Hospital Procedures if any: Cardiac Cath  Medications changes recommendation: See list  Follow Up Plan: DAPT, Medical therapy, risk factory modification. Follow up with Neurology regarding TIA. Discharge exam:   Vitals:    09/14/21 1230   BP: (!) 145/47   Pulse: 89   Resp:    Temp:    SpO2:      Neuro: normal  Chest: Clear to ausculation. No wheezing. Cardiac: Regular rate. s1 and s2 auscultated. No murmur noted. SR  Right radial site soft. Band Aid removed and site left open to air. No drainage. No ecchymosis. Pulses palpable. Abdomen/groin: soft, non-tender, without masses or organomegaly  Lower extremity edema: none     Follow up with primary care provider 1 week  Follow up with cardiology 2 weeks  Follow up with other consultant physicians at their directions.     Discharge Medications:   Misael Palms   Home Medication Instructions MVK:[id number]    Printed on:09/14/21 1421   Medication Information                      aspirin 81 MG chewable tablet  Take 1 tablet by mouth daily             atorvastatin (LIPITOR) 80 MG tablet  Take 1 tablet by mouth nightly             losartan (COZAAR) 50 MG tablet  Take 50 mg by mouth daily             metoprolol tartrate (LOPRESSOR) 25 MG tablet  Take 1 tablet by mouth 2 times daily             nitroGLYCERIN (NITROSTAT) 0.4 MG SL tablet  Place 1 tablet under the tongue every 5 minutes as needed for Chest pain up to max of 3 total doses. If no relief after 1 dose, call 911. ticagrelor (BRILINTA) 90 MG TABS tablet  Take 1 tablet by mouth 2 times daily                Cardiac Cath 9/13/2021     Findings:   Cardiac Arteries and Lesion Findings       LMCA: Normal 0% stenosis. LAD: mID IN STENT STENOSIS 40-50%     LCx: Mild irregularities 10-20%. RCA: Mid in stent stenosis 80%   Distal RCA, distal to previous stent 80% stenosis         Lesion on Dist RCA: Mid subsection. 85% stenosis 20 mm length reduced to     0%. Pre procedure YULIET II flow was noted. Post Procedure YULIET III flow     was present. Good runoff was present. The lesion was diagnosed as High     Risk (C).       Comments: It was difficult to engage the guide radial approach and     required Femoral approach with guide liner support     Devices used         - Luge Wire 182 cm. Number of passes: 1.         - 6 fr GuideLiner. Number of passes: 3.         - Luge Wire 182 cm. Number of passes: 2.         - ASAHI Prowater 190 cm. Number of passes: 1.         - Trek Balloon 2.5mm x 8mm. 3 inflation(s) to a max pressure of: 23     rose.         - Trek Balloon 3.0mm x 20mm. 3 inflation(s) to a max pressure of: 24     rose.         - Xience Taya 2.5 x 28 LORAINE. 1 inflation(s) to a max pressure of: 18     rose.         Lesion on Mid RCA: Proximal subsection. 85% stenosis 12 mm length reduced     to 0%. Pre procedure YULIET II flow was noted. Post Procedure YULIET III flow     was present. Good runoff was present. The lesion was diagnosed as High     Risk (C).       Devices used         - Trek Balloon 2.5mm x 8mm. 4 inflation(s) to a max pressure of: 15     rose.         - Trek Balloon 3.0mm x 20mm. 2 inflation(s) to a max pressure of: 14     rose.         - Xience Taya 3.5 x 33 LORAINE. 1 inflation(s) to a max pressure of: 20     rose.        Coronary Tree        Dominance: Right       LV Analysis   LV function assessed as:Normal.   Ejection Fraction   +----------------------------------------------------------------------+---+   ! Method                                                                !EF%! +----------------------------------------------------------------------+---+   ! LV gram                                                               !54 ! +----------------------------------------------------------------------+---+      Estimated Blood Loss: 5 mL    Coronary Discharge Core Measure: Please indicate the medication given by X, and if not the reasons not given:    Not Given Reason  Given      Beta Blockers x   Cozaar   ACE-I x      Statins x      ASA x      Brilinta. Rx written and given to patient along with samples and co-pay card. Escript sent to pharmacy   OAP (Plavix/Effient/Brilinta) x    SL Nitro   x     Patient had episode of difficulty word finding post cath yesterday and was seen and evaluated by Neurology. Dr. Suzy Monroe Note:   Impression and Plan: Mr. Marshall Elliott is a 59 y.o. male with   Suspected left hemispheric TIA in precentral gyrus of Michael's area; was loaded with Brilinta after cardiac cath; recommended to add daily aspirin and to increase the dose of atorvastatin to 80 mg nightly with a target LDL <70 for secondary stroke prophylaxis. MRI brain could not be done due to stents. Has been on permissive hypertension with target systolic >081-288; ok to normalize pressures now. Speech therapy eval pending and ok to discharge from neurological perspective afterwards.    Elective admit for cardiac cath;s/p RCA stents (9/13/2021)  Comorbid conditions include hypertension, hyperlipidemia  Follow-up appointment in neurology clinic in 4-6 weeks. Neurology service is signing off. Please call us again if there are any significant changes or questions. Discussed in detail with patient post cath POC including but not limited to medications, diet, exercise, right radial artery site care, and follow-up. Questions and concerns addressed. OK for discharge home today. F/U in office in 2 weeks. Discussed with patient and spouse and nursing. Medications and discharge instructions reviewed with patient and nursing.     Electronically signed by NICKI Mckeon CNP on 9/14/2021 at 2:21 [facility]      [phone]

## 2021-09-14 NOTE — CARE COORDINATION
Discharge [facility] Case Management Department  Written by: Patrica Agarwal RN    Patient Name: Abrahan Ohara  Attending Provider: No att. providers found  Admit Date: 2021  5:34 PM  MRN: [id number]  Account: [de-identified]                     : 1960  Discharge Date: 2021      Disposition: home    Patrica Agarwal RN

## 2021-09-14 NOTE — PROGRESS NOTES
Patients BP elevated. Paged neurology to clarify BP goal. Per Dr. Selma Capone, with neuro, the goal is normotensive. Will make cardiology aware.

## 2021-09-14 NOTE — PROGRESS NOTES
Patient new admission into room 2007 from cath lab. Right femoral and right radial cath sites soft. No signs of bleeding or hematoma. Patient oriented to room. Call light within reach. Will continue to monitor.

## 2021-09-14 NOTE — PLAN OF CARE
Call light within reach. Bed in lowest position. Patient on telemetry. Cath site assessed. Pain assessed.

## 2021-09-14 NOTE — CARE COORDINATION
Met with pt to assess for support and complete PHQ-9. Pt stated he lives with his wife. Stated he has 3 adult children and they all live within 2 miles of he and his wife. He reports family is very supportive. Pt stated he works - he is a  and a . Pt stated he is looking forward to going home and hopes it is today. Pt denies any hx or recent feelings of depression  Patient Health Questionnaire-9 (PHQ-9)    Over the last 2 weeks, how often have you been bothered by any of the following problems? 1. Little Interest or pleasure in doing things? [x] Not at all  [] Several Days  [] More than half the day  []  Nearly every day    2. Feeling down, depressed or hopeless? [x] Not at all  [] Several Days  [] More than half the day  []  Nearly every day    3. Trouble falling or staying asleep, or sleeping too much? [x] Not at all  [] Several Days  [] More than half the day  []  Nearly every day    4. Feeling tired or having little energy? [x] Not at all  [] Several Days  [] More than half the day  []  Nearly every day    5. Poor apettite or overeating? [x] Not at all  [] Several Days  [] More than half the day  []  Nearly every day    6. Feeling bad about yourself-or that you are a failure or have let yourself or your family down? [x] Not at all  [] Several Days  [] More than half the day  []  Nearly every day    7. Trouble concentrating on things, such as reading the newspaper or watching television? [x] Not at all  [] Several Days  [] More than half the day  []  Nearly every day    8. Moving or speaking so slowly that other people could have noticed? Or the opposite-being so fidgety or restless that you have been moving around a lot more than usual?   [x] Not at all  [] Several Days  [] More than half the day  []  Nearly every day    9. Thoughts that you would be better off dead or of hurting yourself in some way?    [x] Not at all  [] Several Days  [] More than half the day  []  Nearly every day    Total Score: 0  If you checked off any problems, how difficult have these problems made it for you to do your work, take care of things at home, or get along with other people?    [] Not difficult at all  [] Somewhat Difficult  [] Very Difficult  []  Extremely Difficult

## 2021-09-14 NOTE — PROGRESS NOTES
Speech Language Pathology  Facility/Department: [facility]  Initial Speech/Language/Cognitive Assessment    NAME: May Farley  : 1960   MRN: [id number]  ADMISSION DATE: 2021  ADMITTING DIAGNOSIS: has Cerebrovascular accident (CVA) ([facility] [address] Hypertensive urgency; TIA (transient ischemic attack); CAD in native artery; Expressive aphasia; Transient ischemic attack; and History of TIA (transient ischemic attack) on their problem list.    Date of Eval: 2021   Evaluating Therapist: Haylee Lujan      Primary Complaint: 58-year-old gentleman history of CAD and cardiac stents with elective cardiac cath today and 2 RCA stents on aspirin and Brilinta with history of TIA work-up in April with unremarkable CT CTA. Word finding difficulty postprocedure and last known well 1500. Resolved after 10 to 15 minutes. Pain:  Pain Assessment  Pain Assessment: 0-10  Pain Level: 0    Assessment:  Pt presents with no apparent cognitive deficits and no word finding difficulties at this time. No dysarthria noted, no oral motor deficits. Pt. Reports he feels he is at baseline and has no concerns at this time. No further ST is recommended. Verbal education provided. Recommendations:  Requires SLP Intervention: No  D/C Recommendations: No therapy recommended at discharge. Subjective:     General  Chart Reviewed: Yes  Family / Caregiver Present: Yes  Social/Functional History  Lives With: Spouse  Vision  Vision: Within Functional Limits  Hearing  Hearing: Within functional limits           Objective:     Oral/Motor  Oral Motor: Within functional limits    Auditory Comprehension  Comprehension: Within Functional Limits         Expression  Primary Mode of Expression: Verbal      Motor Speech  Motor Speech:  Within Functional Limits         Cognition:      Orientation  Overall Orientation Status: Within Normal Limits  Attention  Attention: Within Functional Limits  Memory  Memory: Within Funtional Limits  Problem Solving  Problem Solving: Within Functional Limits  Safety/Judgement  Safety/Judgement: Within Functional Limits   Word Generation: WFL   Word Associations: WFL   Verbal Sequencing: WFL    Prognosis:  Speech Therapy Prognosis  Prognosis: Good  Individuals consulted  Consulted and agree with results and recommendations: Patient    Education:  Patient Education: yes  Patient Education Response: Verbalizes understanding          Therapy Time:   Individual Concurrent Group Co-treatment   Time In 0432         Time Out 3467         Minutes 10                       Completed by Sylvia Curry,  Clinician            Co-signed by Chris Gonzalez A.CCC/SLP    9/14/2021 2:43 PM

## 2021-09-15 NOTE — CARE COORDINATION
..Stroke Follow Up Phone Call    Called phone number on record for discharge follow up- No answer.      [x]Attempt #1    []Attempt #2 (final attempt)      Electronically signed by Fatimah Guerra RN on 9/15/21 at 2:02 PM EDT

## 2021-09-16 NOTE — CARE COORDINATION
..Stroke Follow Up Phone Call    Called phone number on record for discharge follow up- No answer.      []Attempt #1    [x]Attempt #2 (final attempt)      Electronically signed by Narinder Marques RN on 9/16/21 at 12:12 PM EDT

## 2021-11-03 ENCOUNTER — OFFICE VISIT (OUTPATIENT)
Dept: PRIMARY CARE CLINIC | Age: 61
End: 2021-11-03
Payer: COMMERCIAL

## 2021-11-03 VITALS
SYSTOLIC BLOOD PRESSURE: 138 MMHG | DIASTOLIC BLOOD PRESSURE: 78 MMHG | HEART RATE: 78 BPM | OXYGEN SATURATION: 95 % | BODY MASS INDEX: 37.52 KG/M2 | WEIGHT: 284.4 LBS

## 2021-11-03 DIAGNOSIS — I25.10 CAD IN NATIVE ARTERY: ICD-10-CM

## 2021-11-03 DIAGNOSIS — R47.01 EXPRESSIVE APHASIA: ICD-10-CM

## 2021-11-03 DIAGNOSIS — Z86.73 HISTORY OF TIA (TRANSIENT ISCHEMIC ATTACK): ICD-10-CM

## 2021-11-03 DIAGNOSIS — G47.33 OSA (OBSTRUCTIVE SLEEP APNEA): Primary | ICD-10-CM

## 2021-11-03 DIAGNOSIS — I63.81 CEREBROVASCULAR ACCIDENT (CVA) DUE TO OCCLUSION OF SMALL ARTERY (HCC): ICD-10-CM

## 2021-11-03 DIAGNOSIS — Z23 NEEDS FLU SHOT: ICD-10-CM

## 2021-11-03 DIAGNOSIS — G45.9 TIA (TRANSIENT ISCHEMIC ATTACK): ICD-10-CM

## 2021-11-03 PROCEDURE — 90471 IMMUNIZATION ADMIN: CPT | Performed by: FAMILY MEDICINE

## 2021-11-03 PROCEDURE — 90674 CCIIV4 VAC NO PRSV 0.5 ML IM: CPT | Performed by: FAMILY MEDICINE

## 2021-11-03 PROCEDURE — 99203 OFFICE O/P NEW LOW 30 MIN: CPT | Performed by: FAMILY MEDICINE

## 2021-11-03 SDOH — ECONOMIC STABILITY: FOOD INSECURITY: WITHIN THE PAST 12 MONTHS, THE FOOD YOU BOUGHT JUST DIDN'T LAST AND YOU DIDN'T HAVE MONEY TO GET MORE.: NEVER TRUE

## 2021-11-03 SDOH — ECONOMIC STABILITY: FOOD INSECURITY: WITHIN THE PAST 12 MONTHS, YOU WORRIED THAT YOUR FOOD WOULD RUN OUT BEFORE YOU GOT MONEY TO BUY MORE.: NEVER TRUE

## 2021-11-03 ASSESSMENT — PATIENT HEALTH QUESTIONNAIRE - PHQ9
SUM OF ALL RESPONSES TO PHQ QUESTIONS 1-9: 0
2. FEELING DOWN, DEPRESSED OR HOPELESS: 0
SUM OF ALL RESPONSES TO PHQ9 QUESTIONS 1 & 2: 0
SUM OF ALL RESPONSES TO PHQ QUESTIONS 1-9: 0
1. LITTLE INTEREST OR PLEASURE IN DOING THINGS: 0
SUM OF ALL RESPONSES TO PHQ QUESTIONS 1-9: 0

## 2021-11-03 ASSESSMENT — SOCIAL DETERMINANTS OF HEALTH (SDOH): HOW HARD IS IT FOR YOU TO PAY FOR THE VERY BASICS LIKE FOOD, HOUSING, MEDICAL CARE, AND HEATING?: NOT HARD AT ALL

## 2021-11-03 NOTE — PROGRESS NOTES
[address] [facility] CARE  [address] [facility] [address]  Dept: Tim Nuñez is a 59 y.o. male New patient, who presents today for his medical conditions/complaintsas noted below. Chief Complaint   Patient presents with    \[facility]\"" Care    Memory Loss     Patients wife seems to think he is forgetting things more often. HPI:     HPI  Had recent cath and TIA ( couldn't speak) was post cath 2 hrs and had bad headache  TIA in April also similar sx ( couldn't say words correctly)    Wife notices : he doesn't remember distant past as well ( 3-5 years ago)  He states he does not have any memory problems that he is aware of. Discussed with him important memory issues that could occur, forgetting conversations that were recent, forgetting important people's names, forgetting how to drive places  that he knows well. He denies any symptoms. Working full time, owns his own business,  plus a part time      Has sleep apnea test: did a home test and it was +    He would like to loose weight.      Reviewed prior notes Cardiology  Reviewed previous Labs and Imaging    LDL Cholesterol (mg/dL)   Date Value   09/13/2021 90   04/20/2021 86       (goal LDL is <100)   BUN (mg/dL)   Date Value   04/19/2021 15     Hemoglobin A1C (%)   Date Value   09/13/2021 5.6     TSH (mIU/L)   Date Value   04/19/2021 1.35     BP Readings from Last 3 Encounters:   11/03/21 138/78   09/14/21 (!) 145/47   04/20/21 (!) 151/106          (goal 120/80)    Past Medical History:   Diagnosis Date    CAD (coronary artery disease)     Cerebral artery occlusion with cerebral infarction ([facility] 04/2021    TIA    Hyperlipidemia     Hypertension       Past Surgical History:   Procedure Laterality Date    APPENDECTOMY      CARDIAC CATHETERIZATION  09/13/2021    LORAINE MID RCA LORAINE DISTAL RCA    CARDIAC CATHETERIZATION      CARDIAC SURGERY      4 stents placed in 2002    TONSILLECTOMY      as a child       Family History   Problem Relation Age of Onset    Stroke Mother     Heart Disease Mother     Heart Disease Father     Diabetes Father     Stroke Father        Social History     Tobacco Use    Smoking status: Former Smoker     Packs/day: 2.00     Years: 15.00     Pack years: 30.00     Types: Cigarettes     Start date:      Quit date:      Years since quittin.8    Smokeless tobacco: Never Used   Substance Use Topics    Alcohol use: Yes     Comment: OCCAISIONALLY      Current Outpatient Medications   Medication Sig Dispense Refill    atorvastatin (LIPITOR) 80 MG tablet Take 1 tablet by mouth nightly 30 tablet 3    ticagrelor (BRILINTA) 90 MG TABS tablet Take 1 tablet by mouth 2 times daily 60 tablet 11    nitroGLYCERIN (NITROSTAT) 0.4 MG SL tablet Place 1 tablet under the tongue every 5 minutes as needed for Chest pain up to max of 3 total doses. If no relief after 1 dose, call 911. 25 tablet 3    metoprolol tartrate (LOPRESSOR) 25 MG tablet Take 1 tablet by mouth 2 times daily 60 tablet 5    losartan (COZAAR) 50 MG tablet Take 50 mg by mouth daily      aspirin 81 MG chewable tablet Take 1 tablet by mouth daily 30 tablet 3     No current facility-administered medications for this visit.      No Known Allergies    Health Maintenance   Topic Date Due    Hepatitis C screen  Never done    HIV screen  Never done    Colon cancer screen colonoscopy  Never done    DTaP/Tdap/Td vaccine (1 - Tdap) 2003    Shingles Vaccine (1 of 2) Never done    Flu vaccine (1) Never done    COVID-19 Vaccine (3 - Pfizer booster) 2021    Lipid screen  2022    Potassium monitoring  2022    Creatinine monitoring  2022    Diabetes screen  2024    Hepatitis A vaccine  Aged Out    Hepatitis B vaccine  Aged Out    Hib vaccine  Aged Out    Meningococcal (ACWY) vaccine  Aged Out    Pneumococcal 0-64 years Vaccine  Aged Out       Subjective: Review of Systems   Constitutional: Negative. HENT: Negative. Objective:     /78   Pulse 78   Wt 284 lb 6.4 oz (129 kg)   SpO2 95%   BMI 37.52 kg/m²   Physical Exam  Vitals and nursing note reviewed. Constitutional:       General: He is not in acute distress. Appearance: He is well-developed. He is not ill-appearing. HENT:      Head: Normocephalic and atraumatic. Right Ear: External ear normal.      Left Ear: External ear normal.   Eyes:      General: No scleral icterus. Right eye: No discharge. Left eye: No discharge. Conjunctiva/sclera: Conjunctivae normal.   Neck:      Thyroid: No thyromegaly. Trachea: No tracheal deviation. Cardiovascular:      Rate and Rhythm: Normal rate and regular rhythm. Heart sounds: Normal heart sounds. Pulmonary:      Effort: Pulmonary effort is normal. No respiratory distress. Breath sounds: Normal breath sounds. No wheezing. Musculoskeletal:      Right lower leg: No edema. Left lower leg: No edema. Lymphadenopathy:      Cervical: No cervical adenopathy. Skin:     General: Skin is warm. Findings: No rash. Neurological:      Mental Status: He is alert and oriented to person, place, and time. Psychiatric:         Mood and Affect: Mood normal.         Behavior: Behavior normal.         Thought Content: Thought content normal.         Assessment:       Diagnosis Orders   1. DELLA (obstructive sleep apnea)     2. Needs flu shot  INFLUENZA, MDCK QUADV, 2 YRS AND OLDER, IM, PF, PREFILL SYR OR SDV, 0.5ML (FLUCELVAX QUADV, PF)   3. CAD in native artery     4. Cerebrovascular accident (CVA) due to occlusion of small artery ([address]     5. TIA (transient ischemic attack)     6. Expressive aphasia     7. History of TIA (transient ischemic attack)          Plan:      Return in about 3 months (around 2/3/2022). Try intermittent fasting, discussed cutting back carbohydrates.     Discussed DELLA and importance of

## 2021-11-03 NOTE — PATIENT INSTRUCTIONS
Patient Education        Learning About Low-Carbohydrate Foods  What foods are low in carbohydrate? The foods you eat contain nutrients, such as vitamins and minerals. Carbohydrate is a nutrient. Your body needs the right amount to stay healthy and work as it should. You can use the list below to help you make choices about which foods to eat. Some foods that are lower in carbohydrate include:  Dairy and dairy alternatives  · Cheese  · Cottage cheese  · Cream cheese  · Nut milk (unsweetened)  · Soy milk (unsweetened)  · Yogurt (Greek, plain)  Fruits  · Avocado  · [employer] and other protein foods  · Almonds  · Beef  · Chicken  · Cod  · Eggs  · Halibut  · Peanut butter and other nut butters  · Pistachios  · Pork  · Pumpkin seeds  · Tofu  · Trout  · [place]  · [place] [facility]  · Walnuts  Vegetables  · Broccoli  · Carrots  · Cauliflower  · Green beans  · Mushrooms  · Peppers  · Salad greens  · Spinach  · Tomatoes  Work with your doctor to find out how much of this nutrient you need. Depending on your health, you may need more or less of it in your diet. Where can you learn more? Go to https://Arclight Media Technologypepiceweb.StarChase. org and sign in to your Tivra account. Enter [phone] in the [facility] box to learn more about \"Learning About Low-Carbohydrate Foods. \"     If you do not have an account, please click on the \"Sign Up Now\" link. Current as of: December 17, 2020               Content Version: 13.0  © 2006-2021 Healthwise, Incorporated. Care instructions adapted under license by [facility] ([facility]). If you have questions about a medical condition or this instruction, always ask your healthcare professional. [place] [address] any warranty or liability for your use of this information. Patient Education        Learning About Low-Carbohydrate Diets  What is a low-carbohydrate diet? A low-carbohydrate (or \"low-carb\") diet limits foods and drinks that have carbohydrates.  This includes grains, fruits, milk and yogurt, and starchy vegetables like potatoes, beans, and corn. It also avoids foods and drinks that have added sugar. Instead, low-carb diets include foods that are high in protein and fat. Why might you follow a low-carb diet? Low-carb diets may be used for a variety of reasons, such as for weight loss. People who have diabetes may use a low-carb diet to help manage their blood sugar levels. What should you do before you start the diet? Talk to your doctor before you try any diet. This is even more important if you have health problems like kidney disease, heart disease, or diabetes. Your doctor may suggest that you meet with a registered dietitian. A dietitian can help you make an eating plan that works for you. What foods do you eat on a low-carb diet? On a low-carb diet, you choose foods that are high in protein and fat. Examples of these are:  · Meat, poultry, and fish. · Eggs. · Nuts, such as walnuts, pecans, almonds, and peanuts. · Peanut butter and other nut butters. · Tofu. · Avocado. · Miranda Deng. · Non-starchy vegetables like broccoli, cauliflower, green beans, mushrooms, peppers, lettuce, and spinach. · Unsweetened non-dairy milks like almond milk and coconut milk. · Cheese, cottage cheese, and cream cheese. Current as of: December 17, 2020               Content Version: 13.0  © 2006-2021 [facility]. Care instructions adapted under license by [facility] ([facility]). If you have questions about a medical condition or this instruction, always ask your healthcare professional. [place] [address] any warranty or liability for your use of this information.

## 2022-03-04 PROBLEM — G47.33 OSA (OBSTRUCTIVE SLEEP APNEA): Chronic | Status: ACTIVE | Noted: 2022-03-04

## 2022-03-07 ENCOUNTER — OFFICE VISIT (OUTPATIENT)
Dept: PRIMARY CARE CLINIC | Age: 62
End: 2022-03-07
Payer: COMMERCIAL

## 2022-03-07 VITALS
OXYGEN SATURATION: 95 % | HEART RATE: 81 BPM | SYSTOLIC BLOOD PRESSURE: 136 MMHG | WEIGHT: 289.6 LBS | BODY MASS INDEX: 38.21 KG/M2 | DIASTOLIC BLOOD PRESSURE: 88 MMHG

## 2022-03-07 DIAGNOSIS — R05.3 CHRONIC COUGH: ICD-10-CM

## 2022-03-07 DIAGNOSIS — Z12.11 COLON CANCER SCREENING: ICD-10-CM

## 2022-03-07 DIAGNOSIS — R05.9 COUGH: ICD-10-CM

## 2022-03-07 DIAGNOSIS — I63.81 CEREBROVASCULAR ACCIDENT (CVA) DUE TO OCCLUSION OF SMALL ARTERY (HCC): ICD-10-CM

## 2022-03-07 DIAGNOSIS — I25.10 CAD IN NATIVE ARTERY: Primary | ICD-10-CM

## 2022-03-07 PROCEDURE — 99214 OFFICE O/P EST MOD 30 MIN: CPT | Performed by: FAMILY MEDICINE

## 2022-03-07 RX ORDER — OMEPRAZOLE 40 MG/1
40 CAPSULE, DELAYED RELEASE ORAL
Qty: 30 CAPSULE | Refills: 5 | Status: SHIPPED | OUTPATIENT
Start: 2022-03-07 | End: 2022-04-07 | Stop reason: ALTCHOICE

## 2022-03-07 ASSESSMENT — ENCOUNTER SYMPTOMS: SHORTNESS OF BREATH: 1

## 2022-03-07 NOTE — PATIENT INSTRUCTIONS
Patient Education        Chronic Cough: Care Instructions  Your Care Instructions     A cough is your body's response to something that bothers your throat or airways. Many things can cause a cough. You might cough because of a cold or the flu, bronchitis, or asthma. Smoking, postnasal drip, allergies, and stomach acid that backs up into your throat also can cause a cough. A cough can be short-term (acute) or long-term (chronic). A chronic cough lasts more than 3 weeks. A chronic cough is often caused by a long-term problem, such as asthma. Another cause might be a medicine, such as an ACE inhibitor. A cough is a symptom, not a disease. To treat a chronic cough, you may need to treat the problem that causes it. You can take a few steps at home to cough less and feel better. Some people cough or clear their throat out of habit for no clear reason. Follow-up care is a key part of your treatment and safety. Be sure to make and go to all appointments, and call your doctor if you are having problems. It's also a good idea to know your test results and keep a list of the medicines you take. How can you care for yourself at home? · Drink plenty of water and other fluids. This may help soothe a dry or sore throat. Honey or lemon juice in hot water or tea may ease a dry cough. · Prop up your head on pillows to help you breathe and ease a cough. · Do not smoke or allow others to smoke around you. Smoke can make a cough worse. If you need help quitting, talk to your doctor about stop-smoking programs and medicines. These can increase your chances of quitting for good. · Avoid exposure to smoke, dust, or other pollutants, or wear a face mask. Check with your doctor or pharmacist to find out which type of face mask will give you the most benefit. · Take cough medicine as directed by your doctor. · Try cough drops to soothe a dry or sore throat. Cough drops don't stop a cough.  Medicine-flavored cough drops are no better than candy-flavored drops or hard candy. Throat clearing  When you have a chronic cough or a disease that may cause this type of cough, you may often feel like you want to clear your throat. This helps bring up mucus. But throat clearing does not always have a cause. Throat clearing can become a habit. The more you do it, the more you feel like you need to do it. But frequent throat clearing can be hard on your vocal cords. It's like slamming them together. To help lessen throat clearing, you can try:  · Taking small sips of water. · Not clearing your throat when you feel you need to. · Swallowing hard when you want to clear your throat. You may want to ask your doctor if a medicine that thins mucus would help. When should you call for help? Call 911 anytime you think you may need emergency care. For example, call if:    · You have severe trouble breathing. Call your doctor now or seek immediate medical care if:    · You cough up blood.     · You have new or worse trouble breathing.     · You have a new or higher fever. Watch closely for changes in your health, and be sure to contact your doctor if:    · You cough more deeply or more often, especially if you notice more mucus or a change in the color of your mucus.     · You do not get better as expected. Where can you learn more? Go to https://uBeampeSensus Energy.Xeneta. org and sign in to your ZALP account. Enter K147 in the ZangZing box to learn more about \"Chronic Cough: Care Instructions. \"     If you do not have an account, please click on the \"Sign Up Now\" link. Current as of: October 6, 2021               Content Version: 13.1  © 9821-1673 Healthwise, Incorporated. Care instructions adapted under license by [facility] ([facility]).  If you have questions about a medical condition or this instruction, always ask your healthcare professional. Dami Reid any warranty or liability for your use of this information.

## 2022-03-07 NOTE — PROGRESS NOTES
[facility] PRIMARY CARE  [address] [facility] [address]  Dept: [phone]    Matilda Claude is a 59 y.o. male Established patient, who presents today for his medical conditions/complaintsas noted below. Chief Complaint   Patient presents with    Hypertension     Patient doesn't check B/P at home    Medication Check    Shortness of Breath    Cough     Dry cough x 2-3 months        HPI:     HPI  Cough for more than 6 months. Started prior to his cath  Was taken off lisinopril. SOB, with laying down in bed and with walking around  No chest pain or pressure. Hasn't seen heart doctor since cath. No memory issues per patient. Has been watching diet for 3 weeks better and loosing weight. Eating fruits and veggies avoiding carbs. Eating chicken and porkchops. Reviewed prior notes Cardiology  Reviewed previous Labs    Hx of previous Stroke, CAD with cath last fall.      LDL Cholesterol (mg/dL)   Date Value   09/13/2021 90   04/20/2021 86       (goal LDL is <100)   BUN (mg/dL)   Date Value   04/19/2021 15     Hemoglobin A1C (%)   Date Value   09/13/2021 5.6     TSH (mIU/L)   Date Value   04/19/2021 1.35     BP Readings from Last 3 Encounters:   03/07/22 136/88   11/03/21 138/78   09/14/21 (!) 145/47          (goal 120/80)    Past Medical History:   Diagnosis Date    CAD (coronary artery disease)     Cerebral artery occlusion with cerebral infarction ([facility] [address] 04/2021    TIA    Hyperlipidemia     Hypertension       Past Surgical History:   Procedure Laterality Date    APPENDECTOMY      CARDIAC CATHETERIZATION  09/13/2021    LORAINE MID RCA LORAINE DISTAL RCA    CARDIAC CATHETERIZATION      CARDIAC SURGERY      4 stents placed in 2002    TONSILLECTOMY      as a child       Family History   Problem Relation Age of Onset    Stroke Mother     Heart Disease Mother     Heart Disease Father     Diabetes Father     Stroke Father        Social History Tobacco Use    Smoking status: Former Smoker     Packs/day: 2.00     Years: 15.00     Pack years: 30.00     Types: Cigarettes     Start date: 63     Quit date:      Years since quittin.2    Smokeless tobacco: Never Used   Substance Use Topics    Alcohol use: Yes     Comment: OCCAISIONALLY      Current Outpatient Medications   Medication Sig Dispense Refill    omeprazole (PRILOSEC) 40 MG delayed release capsule Take 1 capsule by mouth every morning (before breakfast) 30 capsule 5    atorvastatin (LIPITOR) 80 MG tablet Take 1 tablet by mouth nightly 30 tablet 3    ticagrelor (BRILINTA) 90 MG TABS tablet Take 1 tablet by mouth 2 times daily 60 tablet 11    nitroGLYCERIN (NITROSTAT) 0.4 MG SL tablet Place 1 tablet under the tongue every 5 minutes as needed for Chest pain up to max of 3 total doses. If no relief after 1 dose, call 911. 25 tablet 3    metoprolol tartrate (LOPRESSOR) 25 MG tablet Take 1 tablet by mouth 2 times daily 60 tablet 5    losartan (COZAAR) 50 MG tablet Take 50 mg by mouth daily      aspirin 81 MG chewable tablet Take 1 tablet by mouth daily 30 tablet 3     No current facility-administered medications for this visit.      No Known Allergies    Health Maintenance   Topic Date Due    Hepatitis C screen  Never done    HIV screen  Never done    Colorectal Cancer Screen  Never done    DTaP/Tdap/Td vaccine (1 - Tdap) 2003    Shingles Vaccine (1 of 2) Never done    COVID-19 Vaccine (3 - Booster for Pfizer series) 2021    Lipid screen  2022    Potassium monitoring  2022    Creatinine monitoring  2022    Depression Screen  2022    Diabetes screen  2024    Flu vaccine  Completed    Hepatitis A vaccine  Aged Out    Hepatitis B vaccine  Aged Out    Hib vaccine  Aged Out    Meningococcal (ACWY) vaccine  Aged Out    Pneumococcal 0-64 years Vaccine  Aged Out       Subjective:      Review of Systems   Respiratory: Positive for shortness of breath. Cardiovascular: Negative. Neurological: Negative for dizziness and light-headedness. Objective:     /88   Pulse 81   Wt 289 lb 9.6 oz (131.4 kg)   SpO2 95%   BMI 38.21 kg/m²   Physical Exam  Vitals and nursing note reviewed. Constitutional:       General: He is not in acute distress. Appearance: He is well-developed. He is not ill-appearing. HENT:      Head: Normocephalic and atraumatic. Right Ear: External ear normal.      Left Ear: External ear normal.   Eyes:      General: No scleral icterus. Right eye: No discharge. Left eye: No discharge. Conjunctiva/sclera: Conjunctivae normal.      Pupils: Pupils are equal, round, and reactive to light. Neck:      Thyroid: No thyromegaly. Trachea: No tracheal deviation. Cardiovascular:      Rate and Rhythm: Normal rate and regular rhythm. Heart sounds: Normal heart sounds. Pulmonary:      Effort: Pulmonary effort is normal. No respiratory distress. Breath sounds: Normal breath sounds. No wheezing. Musculoskeletal:      Right lower leg: Edema present. Left lower leg: Edema present. Comments: Mild lower leg edema     Lymphadenopathy:      Cervical: No cervical adenopathy. Skin:     General: Skin is warm. Findings: No rash. Neurological:      Mental Status: He is alert and oriented to person, place, and time. Psychiatric:         Mood and Affect: Mood normal.         Behavior: Behavior normal.         Thought Content: Thought content normal.         Assessment:       Diagnosis Orders   1. CAD in native artery  CBC with Auto Differential    Basic Metabolic Panel   2. Cerebrovascular accident (CVA) due to occlusion of small artery (HCC)  CBC with Auto Differential    Basic Metabolic Panel   3. Cough  XR CHEST (2 VW)    CBC with Auto Differential    Basic Metabolic Panel    omeprazole (PRILOSEC) 40 MG delayed release capsule   4.  Chronic cough  omeprazole (PRILOSEC) 40 MG delayed release capsule   5. Colon cancer screening  Cologuard        Plan:      Return in about 4 weeks (around 4/4/2022) for chronic cough. Try GERD medicine for a month and see if it helps Cough  Check labs and cxr  See cardiologist  If cough not better consider weaning off losartan. Orders Placed This Encounter   Procedures    Cologuard    XR CHEST (2 VW)     Standing Status:   Future     Standing Expiration Date:   3/7/2023    CBC with Auto Differential     Standing Status:   Future     Standing Expiration Date:   3/7/2023    Basic Metabolic Panel     Standing Status:   Future     Standing Expiration Date:   3/7/2023     Orders Placed This Encounter   Medications    omeprazole (PRILOSEC) 40 MG delayed release capsule     Sig: Take 1 capsule by mouth every morning (before breakfast)     Dispense:  30 capsule     Refill:  5       Patient given educationalmaterials - see patient instructions. Discussed use, benefit, and side effectsof prescribed medications. All patient questions answered. Pt voiced understanding. Reviewed health maintenance. Instructed to continue current medications, watch diet. Patient agreed with treatment plan. Follow up as directed.      Electronicallysigned by Jj Kay MD on 3/7/2022 at 9:12 AM

## 2022-03-14 ENCOUNTER — HOSPITAL ENCOUNTER (OUTPATIENT)
Age: 62
Discharge: HOME | End: 2022-03-16
Payer: COMMERCIAL

## 2022-03-14 ENCOUNTER — HOSPITAL ENCOUNTER (OUTPATIENT)
Age: 62
Discharge: HOME | End: 2022-03-14
Payer: COMMERCIAL

## 2022-03-14 ENCOUNTER — HOSPITAL ENCOUNTER (OUTPATIENT)
Dept: GENERAL RADIOLOGY | Age: 62
Discharge: HOME | End: 2022-03-16
Payer: COMMERCIAL

## 2022-03-14 DIAGNOSIS — R05.9 COUGH: ICD-10-CM

## 2022-03-14 DIAGNOSIS — I63.81 CEREBROVASCULAR ACCIDENT (CVA) DUE TO OCCLUSION OF SMALL ARTERY (HCC): ICD-10-CM

## 2022-03-14 DIAGNOSIS — I25.10 CAD IN NATIVE ARTERY: ICD-10-CM

## 2022-03-14 LAB
ABSOLUTE EOS #: 0.1 K/UL (ref 0–0.4)
ABSOLUTE LYMPH #: 2.1 K/UL (ref 1–4.8)
ABSOLUTE MONO #: 0.6 K/UL (ref 0.1–1.3)
ANION GAP SERPL CALCULATED.3IONS-SCNC: 10 MMOL/L (ref 9–17)
BASOPHILS # BLD: 1 % (ref 0–2)
BASOPHILS ABSOLUTE: 0.1 K/UL (ref 0–0.2)
BUN BLDV-MCNC: 16 MG/DL (ref 8–23)
CALCIUM SERPL-MCNC: 9.1 MG/DL (ref 8.6–10.4)
CHLORIDE BLD-SCNC: 101 MMOL/L (ref 98–107)
CO2: 27 MMOL/L (ref 20–31)
CREAT SERPL-MCNC: 0.83 MG/DL (ref 0.7–1.2)
EOSINOPHILS RELATIVE PERCENT: 2 % (ref 0–4)
GFR AFRICAN AMERICAN: >60 ML/MIN
GFR NON-AFRICAN AMERICAN: >60 ML/MIN
GFR SERPL CREATININE-BSD FRML MDRD: ABNORMAL ML/MIN/{1.73_M2}
GLUCOSE BLD-MCNC: 105 MG/DL (ref 70–99)
HCT VFR BLD CALC: 46.6 % (ref 41–53)
HEMOGLOBIN: 15.6 G/DL (ref 13.5–17.5)
LYMPHOCYTES # BLD: 31 % (ref 24–44)
MCH RBC QN AUTO: 28 PG (ref 26–34)
MCHC RBC AUTO-ENTMCNC: 33.4 G/DL (ref 31–37)
MCV RBC AUTO: 83.8 FL (ref 80–100)
MONOCYTES # BLD: 8 % (ref 1–7)
PDW BLD-RTO: 14 % (ref 11.5–14.9)
PLATELET # BLD: 215 K/UL (ref 150–450)
PMV BLD AUTO: 7.6 FL (ref 6–12)
POTASSIUM SERPL-SCNC: 4.6 MMOL/L (ref 3.7–5.3)
RBC # BLD: 5.56 M/UL (ref 4.5–5.9)
SEG NEUTROPHILS: 58 % (ref 36–66)
SEGMENTED NEUTROPHILS ABSOLUTE COUNT: 4.1 K/UL (ref 1.3–9.1)
SODIUM BLD-SCNC: 138 MMOL/L (ref 135–144)
WBC # BLD: 7 K/UL (ref 3.5–11)

## 2022-03-14 PROCEDURE — 80048 BASIC METABOLIC PNL TOTAL CA: CPT

## 2022-03-14 PROCEDURE — 85025 COMPLETE CBC W/AUTO DIFF WBC: CPT

## 2022-03-14 PROCEDURE — 36415 COLL VENOUS BLD VENIPUNCTURE: CPT

## 2022-03-14 PROCEDURE — 71046 X-RAY EXAM CHEST 2 VIEWS: CPT

## 2022-04-07 ENCOUNTER — OFFICE VISIT (OUTPATIENT)
Dept: PRIMARY CARE CLINIC | Age: 62
End: 2022-04-07
Payer: COMMERCIAL

## 2022-04-07 VITALS
BODY MASS INDEX: 37.89 KG/M2 | OXYGEN SATURATION: 92 % | DIASTOLIC BLOOD PRESSURE: 82 MMHG | WEIGHT: 287.2 LBS | HEART RATE: 84 BPM | SYSTOLIC BLOOD PRESSURE: 136 MMHG

## 2022-04-07 DIAGNOSIS — R05.9 COUGH: Primary | ICD-10-CM

## 2022-04-07 DIAGNOSIS — I25.10 CAD IN NATIVE ARTERY: ICD-10-CM

## 2022-04-07 PROCEDURE — 99213 OFFICE O/P EST LOW 20 MIN: CPT | Performed by: FAMILY MEDICINE

## 2022-04-07 RX ORDER — FUROSEMIDE 20 MG/1
TABLET ORAL
COMMUNITY
Start: 2022-03-31

## 2022-04-07 RX ORDER — CLOPIDOGREL BISULFATE 75 MG/1
TABLET ORAL
Status: ON HOLD | COMMUNITY
Start: 2022-03-31 | End: 2022-04-21 | Stop reason: HOSPADM

## 2022-04-07 NOTE — PROGRESS NOTES
[facility] PRIMARY CARE  [address] [facility] [address]  Dept: [phone]    Bhavna Acevedo is a 59 y.o. male Established patient, who presents today for his medical conditions/complaintsas noted below. Chief Complaint   Patient presents with    Shortness of Breath     Patient is here for a follow up on SOB and cough. Patient did see a cardiologist        HPI:     HPI  Cough better: a few ( 2-3)  coughs a few times a day, much better.    Reviewed prior notes None   Cardiology started lasix and SOB is better, he was started on them last week  ( Dr Tami Taylor and [facility])   Herlene [place] and on plavix now  Reviewed previous Labs and Imaging    LDL Cholesterol (mg/dL)   Date Value   2021 90   2021 86       (goal LDL is <100)   BUN (mg/dL)   Date Value   2022 16     Hemoglobin A1C (%)   Date Value   2021 5.6     TSH (mIU/L)   Date Value   2021 1.35     BP Readings from Last 3 Encounters:   22 136/82   22 136/88   21 138/78          (goal 120/80)    Past Medical History:   Diagnosis Date    CAD (coronary artery disease)     Cerebral artery occlusion with cerebral infarction ([facility] [address] 2021    TIA    Hyperlipidemia     Hypertension       Past Surgical History:   Procedure Laterality Date    APPENDECTOMY      CARDIAC CATHETERIZATION  2021    LORAINE MID RCA LORAINE DISTAL RCA    CARDIAC CATHETERIZATION      CARDIAC SURGERY      4 stents placed in     TONSILLECTOMY      as a child       Family History   Problem Relation Age of Onset    Stroke Mother     Heart Disease Mother     Heart Disease Father     Diabetes Father     Stroke Father        Social History     Tobacco Use    Smoking status: Former Smoker     Packs/day: 2.00     Years: 15.00     Pack years: 30.00     Types: Cigarettes     Start date:      Quit date:      Years since quittin.2    Smokeless tobacco: Never Used   Substance Use Topics  Alcohol use: Yes     Comment: OCCAISIONALLY      Current Outpatient Medications   Medication Sig Dispense Refill    furosemide (LASIX) 20 MG tablet take 1 tablet by mouth once daily      clopidogrel (PLAVIX) 75 MG tablet take 1 tablet by mouth once daily      atorvastatin (LIPITOR) 80 MG tablet Take 1 tablet by mouth nightly 30 tablet 3    nitroGLYCERIN (NITROSTAT) 0.4 MG SL tablet Place 1 tablet under the tongue every 5 minutes as needed for Chest pain up to max of 3 total doses. If no relief after 1 dose, call 911. 25 tablet 3    metoprolol tartrate (LOPRESSOR) 25 MG tablet Take 1 tablet by mouth 2 times daily 60 tablet 5    losartan (COZAAR) 50 MG tablet Take 50 mg by mouth daily      aspirin 81 MG chewable tablet Take 1 tablet by mouth daily 30 tablet 3    ticagrelor (BRILINTA) 90 MG TABS tablet Take 1 tablet by mouth 2 times daily (Patient not taking: Reported on 4/7/2022) 60 tablet 11     No current facility-administered medications for this visit. No Known Allergies    Health Maintenance   Topic Date Due    Hepatitis C screen  Never done    HIV screen  Never done    Colorectal Cancer Screen  Never done    DTaP/Tdap/Td vaccine (1 - Tdap) 06/28/2003    Shingles Vaccine (1 of 2) Never done    COVID-19 Vaccine (3 - Booster for Pfizer series) 08/23/2021    Lipid screen  09/13/2022    Depression Screen  11/03/2022    Potassium monitoring  03/14/2023    Creatinine monitoring  03/14/2023    Diabetes screen  09/13/2024    Flu vaccine  Completed    Hepatitis A vaccine  Aged Out    Hepatitis B vaccine  Aged Out    Hib vaccine  Aged Out    Meningococcal (ACWY) vaccine  Aged Out    Pneumococcal 0-64 years Vaccine  Aged Out       Subjective:      Review of Systems    Objective:     /82   Pulse 84   Wt 287 lb 3.2 oz (130.3 kg)   SpO2 92%   BMI 37.89 kg/m²   Physical Exam  Vitals and nursing note reviewed. Constitutional:       General: He is not in acute distress.      Appearance: He is well-developed. He is not ill-appearing. HENT:      Head: Normocephalic and atraumatic. Right Ear: External ear normal.      Left Ear: External ear normal.   Eyes:      General: No scleral icterus. Right eye: No discharge. Left eye: No discharge. Conjunctiva/sclera: Conjunctivae normal.   Neck:      Thyroid: No thyromegaly. Trachea: No tracheal deviation. Cardiovascular:      Rate and Rhythm: Normal rate and regular rhythm. Heart sounds: Normal heart sounds. Pulmonary:      Effort: Pulmonary effort is normal. No respiratory distress. Breath sounds: Normal breath sounds. No wheezing. Musculoskeletal:      Right lower leg: Edema present. Left lower leg: Edema present. Comments: Mild lower leg edema   Lymphadenopathy:      Cervical: No cervical adenopathy. Skin:     General: Skin is warm. Findings: No rash. Neurological:      Mental Status: He is alert and oriented to person, place, and time. Psychiatric:         Mood and Affect: Mood normal.         Behavior: Behavior normal.         Thought Content: Thought content normal.         Assessment:       Diagnosis Orders   1. Cough     2. CAD in native artery          Plan:      Return in about 6 months (around 10/7/2022) for cough. Says cough is better try of the PPI medication and see how his cough does. Will need copy of notes from his cardiologist.  Berl Iqra in 6 months  Call as needed  No orders of the defined types were placed in this encounter. No orders of the defined types were placed in this encounter. Patient given educationalmaterials - see patient instructions. Discussed use, benefit, and side effectsof prescribed medications. All patient questions answered. Pt voiced understanding. Reviewed health maintenance. Instructed to continue current medications, diet andexercise. Patient agreed with treatment plan. Follow up as directed.      Electronicallysigned by Holly Bro MD on 4/7/2022 at 8:48 AM

## 2022-04-07 NOTE — PATIENT INSTRUCTIONS
Patient Education        A Healthy Lifestyle: Care Instructions  Your Care Instructions     A healthy lifestyle can help you feel good, stay at a healthy weight, and have plenty of energy for both work and play. A healthy lifestyle is something youcan share with your whole family. A healthy lifestyle also can lower your risk for serious health problems, suchas high blood pressure, heart disease, and diabetes. You can follow a few steps listed below to improve your health and the healthof your family. Follow-up care is a key part of your treatment and safety. Be sure to make and go to all appointments, and call your doctor if you are having problems. It's also a good idea to know your test results and keep alist of the medicines you take. How can you care for yourself at home?  Do not eat too much sugar, fat, or fast foods. You can still have dessert and treats now and then. The goal is moderation.  Start small to improve your eating habits. Pay attention to portion sizes, drink less juice and soda pop, and eat more fruits and vegetables. ? Eat a healthy amount of food. A 3-ounce serving of meat, for example, is about the size of a deck of cards. Fill the rest of your plate with vegetables and whole grains. ? Limit the amount of soda and sports drinks you have every day. Drink more water when you are thirsty. ? Eat plenty of fruits and vegetables every day. Have an apple or some carrot sticks as an afternoon snack instead of a candy bar. Try to have fruits and/or vegetables at every meal.   Make exercise part of your daily routine. You may want to start with simple activities, such as walking, bicycling, or slow swimming. Try to be active 30 to 60 minutes every day. You do not need to do all 30 to 60 minutes all at once. For example, you can exercise 3 times a day for 10 or 20 minutes.  Moderate exercise is safe for most people, but it is always a good idea to talk to your doctor before starting an exercise program.   Keep moving. [place] Mora the lawn, work in the garden, or [employer]. Take the stairs instead of the elevator at work.  If you smoke, quit. People who smoke have an increased risk for heart attack, stroke, cancer, and other lung illnesses. Quitting is hard, but there are ways to boost your chance of quitting tobacco for good. ? Use nicotine gum, patches, or lozenges. ? Ask your doctor about stop-smoking programs and medicines. ? Keep trying. In addition to reducing your risk of diseases in the future, you will notice some benefits soon after you stop using tobacco. If you have shortness of breath or asthma symptoms, they will likely get better within a few weeks after you quit.  Limit how much alcohol you drink. Moderate amounts of alcohol (up to 2 drinks a day for men, 1 drink a day for women) are okay. But drinking too much can lead to liver problems, high blood pressure, and other health problems. Family health  If you have a family, there are many things you can do together to improve yourhealth.  Eat meals together as a family as often as possible.  Eat healthy foods. This includes fruits, vegetables, lean meats and dairy, and whole grains.  Include your family in your fitness plan. Most people think of activities such as jogging or tennis as the way to fitness, but there are many ways you and your family can be more active. Anything that makes you breathe hard and gets your heart pumping is exercise. Here are some tips:  ? Walk to do errands or to take your child to school or the bus.  ? Go for a family bike ride after dinner instead of watching TV. Where can you learn more? Go to https://Nu3laura.[employer]. org and sign in to your NaHere account. Enter L095 in the [employer] box to learn more about \"A Healthy Lifestyle: Care Instructions. \"     If you do not have an account, please click on the \"Sign Up Now\" link.   Current as of: June 16, 2021               Content Version: 13.2  © 7605-6184 Healthwise, Incorporated. Care instructions adapted under license by [facility] ([facility]). If you have questions about a medical condition or this instruction, always ask your healthcare professional. [address] any warranty or liability for your use of this information.

## 2022-04-13 LAB — NONINV COLON CA DNA+OCC BLD SCRN STL QL: NEGATIVE

## 2022-04-15 ENCOUNTER — OFFICE VISIT (OUTPATIENT)
Dept: PRIMARY CARE CLINIC | Age: 62
End: 2022-04-15
Payer: COMMERCIAL

## 2022-04-15 VITALS
HEART RATE: 79 BPM | WEIGHT: 292 LBS | DIASTOLIC BLOOD PRESSURE: 86 MMHG | OXYGEN SATURATION: 95 % | HEIGHT: 73 IN | BODY MASS INDEX: 38.7 KG/M2 | SYSTOLIC BLOOD PRESSURE: 138 MMHG | TEMPERATURE: 97.2 F

## 2022-04-15 DIAGNOSIS — K59.00 CONSTIPATION, UNSPECIFIED CONSTIPATION TYPE: ICD-10-CM

## 2022-04-15 DIAGNOSIS — R07.81 RIB PAIN ON RIGHT SIDE: Primary | ICD-10-CM

## 2022-04-15 PROCEDURE — 99213 OFFICE O/P EST LOW 20 MIN: CPT | Performed by: FAMILY MEDICINE

## 2022-04-15 RX ORDER — DOCUSATE SODIUM 100 MG/1
100 CAPSULE, LIQUID FILLED ORAL 2 TIMES DAILY
Qty: 60 CAPSULE | Refills: 0 | Status: ON HOLD | OUTPATIENT
Start: 2022-04-15 | End: 2022-04-21 | Stop reason: HOSPADM

## 2022-04-15 RX ORDER — KETOROLAC TROMETHAMINE 10 MG/1
10 TABLET, FILM COATED ORAL EVERY 6 HOURS PRN
Qty: 20 TABLET | Refills: 0 | Status: SHIPPED | OUTPATIENT
Start: 2022-04-15 | End: 2022-09-21 | Stop reason: ALTCHOICE

## 2022-04-15 ASSESSMENT — PATIENT HEALTH QUESTIONNAIRE - PHQ9
1. LITTLE INTEREST OR PLEASURE IN DOING THINGS: 0
2. FEELING DOWN, DEPRESSED OR HOPELESS: 0
SUM OF ALL RESPONSES TO PHQ QUESTIONS 1-9: 0
SUM OF ALL RESPONSES TO PHQ9 QUESTIONS 1 & 2: 0
SUM OF ALL RESPONSES TO PHQ QUESTIONS 1-9: 0

## 2022-04-15 NOTE — PATIENT INSTRUCTIONS
Patient Education        Musculoskeletal Chest Pain: Care Instructions  Your Care Instructions     Chest pain is not always a sign that something is wrong with your heart or that you have another serious problem. The doctor thinks your chest pain is caused by strained muscles or ligaments, inflamed chest cartilage, or another problem in your chest, rather than by your heart. You may need more tests to find thecause of your chest pain. Follow-up care is a key part of your treatment and safety. Be sure to make and go to all appointments, and call your doctor if you are having problems. It's also a good idea to know your test results and keep alist of the medicines you take. How can you care for yourself at home?  Take pain medicines exactly as directed. ? If the doctor gave you a prescription medicine for pain, take it as prescribed. ? If you are not taking a prescription pain medicine, ask your doctor if you can take an over-the-counter medicine.  Rest and protect the sore area.  Stop, change, or take a break from any activity that may be causing your pain or soreness.  Put ice or a cold pack on the sore area for 10 to 20 minutes at a time. Try to do this every 1 to 2 hours for the next 3 days (when you are awake) or until the swelling goes down. Put a thin cloth between the ice and your skin.  After 2 or 3 days, apply a heating pad set on low or a warm cloth to the area that hurts. Some doctors suggest that you go back and forth between hot and cold.  Do not wrap or tape your ribs for support. This may cause you to take smaller breaths, which could increase your risk of lung problems.  Mentholated creams such as Bengay or Icy Hot may soothe sore muscles. Follow the instructions on the package.  Follow your doctor's instructions for exercising.  Gentle stretching and massage may help you get better faster.  Stretch slowly to the point just before pain begins, and hold the stretch for at least 15 to 30 seconds. Do this 3 or 4 times a day. Stretch just after you have applied heat.  As your pain gets better, slowly return to your normal activities. Any increased pain may be a sign that you need to rest a while longer. When should you call for help? Call 911 anytime you think you may need emergency care. For example, call if:     You have chest pain or pressure. This may occur with:  ? Sweating. ? Shortness of breath. ? Nausea or vomiting. ? Pain that spreads from the chest to the neck, jaw, or one or both shoulders or arms. ? Dizziness or lightheadedness. ? A fast or uneven pulse. After calling 911, chew 1 adult-strength aspirin. Wait for an ambulance. Do not try to drive yourself.      You have sudden chest pain and shortness of breath, or you cough up blood. Call your doctor now or seek immediate medical care if:     You have any trouble breathing.      Your chest pain gets worse.      Your chest pain occurs consistently with exercise and is relieved by rest.   Watch closely for changes in your health, and be sure to contact your doctor if:     Your chest pain does not get better after 1 week. Where can you learn more? Go to https://[employer] Display.[employer]. org and sign in to your VenueBook account. Enter 1473 [id number] in the [employer] box to learn more about \"Musculoskeletal Chest Pain: Care Instructions. \"     If you do not have an account, please click on the \"Sign Up Now\" link. Current as of: July 1, 2021               Content Version: 13.2  © 2006-2022 Healthwise, Incorporated. Care instructions adapted under license by [facility] ([facility]). If you have questions about a medical condition or this instruction, always ask your healthcare professional. [place] [address] any warranty or liability for your use of this information.

## 2022-04-15 NOTE — PROGRESS NOTES
Dimitri Lara is a 59 y.o. Karalee Drown presents today for his medical conditions/complaints as noted below. Chief Complaint   Patient presents with    Abdominal Pain     Pt states he hasn't had a bowel movment since Tuesday. Pt states every time he coughs he has a severe RT upper abd pain. x4 days    Cough         HPI:     HPI  Cough, no temp about 1 week. Monday : had a coughing fit and then got severe right lower chest pain during the coughing fit  Severe pain any time he coughs  Decreased apettite since last week  Passing flatus well. Worse pain with bending over and laying down and coughing. Has been sleeping on the cough  Using otc cough meds and lozenges  Tried tylenol : no help. Heat helps. + constipation, small BM on Tuesday  Good BM on Sunday       Current Outpatient Medications   Medication Sig Dispense Refill    ketorolac (TORADOL) 10 MG tablet Take 1 tablet by mouth every 6 hours as needed for Pain 20 tablet 0    docusate sodium (COLACE) 100 MG capsule Take 1 capsule by mouth 2 times daily 60 capsule 0    furosemide (LASIX) 20 MG tablet take 1 tablet by mouth once daily      clopidogrel (PLAVIX) 75 MG tablet take 1 tablet by mouth once daily      atorvastatin (LIPITOR) 80 MG tablet Take 1 tablet by mouth nightly 30 tablet 3    nitroGLYCERIN (NITROSTAT) 0.4 MG SL tablet Place 1 tablet under the tongue every 5 minutes as needed for Chest pain up to max of 3 total doses. If no relief after 1 dose, call 911. 25 tablet 3    metoprolol tartrate (LOPRESSOR) 25 MG tablet Take 1 tablet by mouth 2 times daily 60 tablet 5    losartan (COZAAR) 50 MG tablet Take 50 mg by mouth daily      aspirin 81 MG chewable tablet Take 1 tablet by mouth daily 30 tablet 3     No current facility-administered medications for this visit. No Known Allergies    Subjective:     Review of Systems   Constitutional: Negative.         Objective:     /86   Pulse 79   Temp 97.2 °F (36.2 °C) (Infrared)   Ht 6' 1\" (1.854 m)   Wt 292 lb (132.5 kg)   SpO2 95%   BMI 38.52 kg/m²   Physical Exam  Vitals and nursing note reviewed. Constitutional:       General: He is not in acute distress. Appearance: Normal appearance. He is well-developed. He is not ill-appearing. HENT:      Head: Normocephalic and atraumatic. Right Ear: External ear normal.      Left Ear: External ear normal.   Eyes:      General: No scleral icterus. Right eye: No discharge. Left eye: No discharge. Conjunctiva/sclera: Conjunctivae normal.   Neck:      Thyroid: No thyromegaly. Trachea: No tracheal deviation. Cardiovascular:      Rate and Rhythm: Normal rate and regular rhythm. Heart sounds: Normal heart sounds. Pulmonary:      Effort: Pulmonary effort is normal. No respiratory distress. Breath sounds: Normal breath sounds. No wheezing. Chest:      Comments: Extremely tender in the right mid to lateral clavicular line of the ribs. Ribs are tender in the mid chest down to 1 or 2 ribs. He cannot lay flat because the pain in the ribs gets severe. He has no pain with lateral chest compression of the ribs and no pain with anterior lateral compression of the midsternal and back but is exquisitely tender as stated above  Lymphadenopathy:      Cervical: No cervical adenopathy. Skin:     General: Skin is warm. Findings: No rash. Neurological:      Mental Status: He is alert and oriented to person, place, and time. Psychiatric:         Mood and Affect: Mood normal.         Behavior: Behavior normal.         Thought Content: Thought content normal.         Assessment:       Diagnosis Orders   1. Rib pain on right side  ketorolac (TORADOL) 10 MG tablet   2. Constipation, unspecified constipation type  docusate sodium (COLACE) 100 MG capsule        Plan:      No follow-ups on file.   Cold or warm packs to the chest.  Try Toradol for pain call as needed  No orders of the defined types were placed in this encounter. Orders Placed This Encounter   Medications    ketorolac (TORADOL) 10 MG tablet     Sig: Take 1 tablet by mouth every 6 hours as needed for Pain     Dispense:  20 tablet     Refill:  0    docusate sodium (COLACE) 100 MG capsule     Sig: Take 1 capsule by mouth 2 times daily     Dispense:  60 capsule     Refill:  0      Reviewed medications and possibleside effects.        Electronically signed by Roxann Whitman MD on 4/15/2022 at 11:49 AM

## 2022-04-16 ENCOUNTER — APPOINTMENT (OUTPATIENT)
Dept: GENERAL RADIOLOGY | Age: 62
DRG: 871 | End: 2022-04-16
Payer: COMMERCIAL

## 2022-04-16 ENCOUNTER — APPOINTMENT (OUTPATIENT)
Dept: CT IMAGING | Age: 62
DRG: 871 | End: 2022-04-16
Payer: COMMERCIAL

## 2022-04-16 ENCOUNTER — HOSPITAL ENCOUNTER (INPATIENT)
Age: 62
LOS: 5 days | Discharge: HOME | DRG: 871 | End: 2022-04-21
Attending: STUDENT IN AN ORGANIZED HEALTH CARE EDUCATION/TRAINING PROGRAM | Admitting: INTERNAL MEDICINE
Payer: COMMERCIAL

## 2022-04-16 DIAGNOSIS — J90 PLEURAL EFFUSION: Primary | ICD-10-CM

## 2022-04-16 DIAGNOSIS — S30.1XXA HEMATOMA OF RECTUS SHEATH, INITIAL ENCOUNTER: ICD-10-CM

## 2022-04-16 DIAGNOSIS — R65.21 SEPSIS WITH ACUTE RENAL FAILURE AND SEPTIC SHOCK, DUE TO UNSPECIFIED ORGANISM, UNSPECIFIED ACUTE RENAL FAILURE TYPE (HCC): ICD-10-CM

## 2022-04-16 DIAGNOSIS — A41.9 SEPSIS WITH ACUTE RENAL FAILURE AND SEPTIC SHOCK, DUE TO UNSPECIFIED ORGANISM, UNSPECIFIED ACUTE RENAL FAILURE TYPE (HCC): ICD-10-CM

## 2022-04-16 DIAGNOSIS — N17.9 SEPSIS WITH ACUTE RENAL FAILURE AND SEPTIC SHOCK, DUE TO UNSPECIFIED ORGANISM, UNSPECIFIED ACUTE RENAL FAILURE TYPE (HCC): ICD-10-CM

## 2022-04-16 DIAGNOSIS — N17.9 AKI (ACUTE KIDNEY INJURY) (HCC): ICD-10-CM

## 2022-04-16 LAB
ABSOLUTE EOS #: 0 K/UL (ref 0–0.4)
ABSOLUTE LYMPH #: 4.05 K/UL (ref 1–4.8)
ABSOLUTE MONO #: 1.07 K/UL (ref 0.1–1.3)
ALBUMIN SERPL-MCNC: 3.9 G/DL (ref 3.5–5.2)
ALP BLD-CCNC: 62 U/L (ref 40–129)
ALT SERPL-CCNC: 17 U/L (ref 5–41)
ANION GAP SERPL CALCULATED.3IONS-SCNC: 14 MMOL/L (ref 9–17)
AST SERPL-CCNC: 11 U/L
BASOPHILS # BLD: 1 % (ref 0–2)
BASOPHILS ABSOLUTE: 0.21 K/UL (ref 0–0.2)
BILIRUB SERPL-MCNC: 0.47 MG/DL (ref 0.3–1.2)
BUN BLDV-MCNC: 35 MG/DL (ref 8–23)
CALCIUM SERPL-MCNC: 8.8 MG/DL (ref 8.6–10.4)
CHLORIDE BLD-SCNC: 97 MMOL/L (ref 98–107)
CO2: 22 MMOL/L (ref 20–31)
CREAT SERPL-MCNC: 1.86 MG/DL (ref 0.7–1.2)
EOSINOPHILS RELATIVE PERCENT: 0 % (ref 0–4)
GFR AFRICAN AMERICAN: 45 ML/MIN
GFR NON-AFRICAN AMERICAN: 37 ML/MIN
GFR SERPL CREATININE-BSD FRML MDRD: ABNORMAL ML/MIN/{1.73_M2}
GLUCOSE BLD-MCNC: 130 MG/DL (ref 70–99)
HCT VFR BLD CALC: 27.2 % (ref 41–53)
HCT VFR BLD CALC: 31 % (ref 41–53)
HEMOGLOBIN: 10.7 G/DL (ref 13.5–17.5)
HEMOGLOBIN: 8.8 G/DL (ref 13.5–17.5)
INFLUENZA A: NOT DETECTED
INFLUENZA B: NOT DETECTED
INR BLD: 1.1
LACTIC ACID, SEPSIS: 2.4 MMOL/L (ref 0.5–1.9)
LACTIC ACID, SEPSIS: 2.8 MMOL/L (ref 0.5–1.9)
LIPASE: 19 U/L (ref 13–60)
LYMPHOCYTES # BLD: 19 % (ref 24–44)
MCH RBC QN AUTO: 28.3 PG (ref 26–34)
MCHC RBC AUTO-ENTMCNC: 34.4 G/DL (ref 31–37)
MCV RBC AUTO: 82.2 FL (ref 80–100)
MONOCYTES # BLD: 5 % (ref 1–7)
MORPHOLOGY: ABNORMAL
PARTIAL THROMBOPLASTIN TIME: 31.8 SEC (ref 24–36)
PDW BLD-RTO: 13.4 % (ref 11.5–14.9)
PLATELET # BLD: 360 K/UL (ref 150–450)
PMV BLD AUTO: 7.1 FL (ref 6–12)
POTASSIUM SERPL-SCNC: 4.6 MMOL/L (ref 3.7–5.3)
PRO-BNP: 253 PG/ML
PROTHROMBIN TIME: 13.8 SEC (ref 11.8–14.6)
RBC # BLD: 3.77 M/UL (ref 4.5–5.9)
SARS-COV-2 RNA, RT PCR: NOT DETECTED
SEG NEUTROPHILS: 75 % (ref 36–66)
SEGMENTED NEUTROPHILS ABSOLUTE COUNT: 15.97 K/UL (ref 1.3–9.1)
SODIUM BLD-SCNC: 133 MMOL/L (ref 135–144)
SOURCE: NORMAL
SPECIMEN DESCRIPTION: NORMAL
TOTAL PROTEIN: 6.2 G/DL (ref 6.4–8.3)
TROPONIN, HIGH SENSITIVITY: 16 NG/L (ref 0–22)
TROPONIN, HIGH SENSITIVITY: 16 NG/L (ref 0–22)
WBC # BLD: 21.3 K/UL (ref 3.5–11)

## 2022-04-16 PROCEDURE — 86900 BLOOD TYPING SEROLOGIC ABO: CPT

## 2022-04-16 PROCEDURE — 87641 MR-STAPH DNA AMP PROBE: CPT

## 2022-04-16 PROCEDURE — 2580000003 HC RX 258: Performed by: SURGERY

## 2022-04-16 PROCEDURE — 74174 CTA ABD&PLVS W/CONTRAST: CPT

## 2022-04-16 PROCEDURE — 85018 HEMOGLOBIN: CPT

## 2022-04-16 PROCEDURE — 86901 BLOOD TYPING SEROLOGIC RH(D): CPT

## 2022-04-16 PROCEDURE — 85730 THROMBOPLASTIN TIME PARTIAL: CPT

## 2022-04-16 PROCEDURE — 2060000000 HC ICU INTERMEDIATE R&B

## 2022-04-16 PROCEDURE — 85014 HEMATOCRIT: CPT

## 2022-04-16 PROCEDURE — 99285 EMERGENCY DEPT VISIT HI MDM: CPT

## 2022-04-16 PROCEDURE — 93005 ELECTROCARDIOGRAM TRACING: CPT | Performed by: STUDENT IN AN ORGANIZED HEALTH CARE EDUCATION/TRAINING PROGRAM

## 2022-04-16 PROCEDURE — 85610 PROTHROMBIN TIME: CPT

## 2022-04-16 PROCEDURE — 87636 SARSCOV2 & INF A&B AMP PRB: CPT

## 2022-04-16 PROCEDURE — 83605 ASSAY OF LACTIC ACID: CPT

## 2022-04-16 PROCEDURE — 83880 ASSAY OF NATRIURETIC PEPTIDE: CPT

## 2022-04-16 PROCEDURE — 87040 BLOOD CULTURE FOR BACTERIA: CPT

## 2022-04-16 PROCEDURE — 85025 COMPLETE CBC W/AUTO DIFF WBC: CPT

## 2022-04-16 PROCEDURE — 84484 ASSAY OF TROPONIN QUANT: CPT

## 2022-04-16 PROCEDURE — 6370000000 HC RX 637 (ALT 250 FOR IP): Performed by: STUDENT IN AN ORGANIZED HEALTH CARE EDUCATION/TRAINING PROGRAM

## 2022-04-16 PROCEDURE — 6360000002 HC RX W HCPCS: Performed by: STUDENT IN AN ORGANIZED HEALTH CARE EDUCATION/TRAINING PROGRAM

## 2022-04-16 PROCEDURE — 96375 TX/PRO/DX INJ NEW DRUG ADDON: CPT

## 2022-04-16 PROCEDURE — 2580000003 HC RX 258: Performed by: STUDENT IN AN ORGANIZED HEALTH CARE EDUCATION/TRAINING PROGRAM

## 2022-04-16 PROCEDURE — 80053 COMPREHEN METABOLIC PANEL: CPT

## 2022-04-16 PROCEDURE — 86850 RBC ANTIBODY SCREEN: CPT

## 2022-04-16 PROCEDURE — 06HY33Z INSERTION OF INFUSION DEVICE INTO LOWER VEIN, PERCUTANEOUS APPROACH: ICD-10-PCS | Performed by: STUDENT IN AN ORGANIZED HEALTH CARE EDUCATION/TRAINING PROGRAM

## 2022-04-16 PROCEDURE — 96365 THER/PROPH/DIAG IV INF INIT: CPT

## 2022-04-16 PROCEDURE — 71045 X-RAY EXAM CHEST 1 VIEW: CPT

## 2022-04-16 PROCEDURE — 83690 ASSAY OF LIPASE: CPT

## 2022-04-16 PROCEDURE — 86920 COMPATIBILITY TEST SPIN: CPT

## 2022-04-16 PROCEDURE — 6360000004 HC RX CONTRAST MEDICATION: Performed by: STUDENT IN AN ORGANIZED HEALTH CARE EDUCATION/TRAINING PROGRAM

## 2022-04-16 PROCEDURE — 96376 TX/PRO/DX INJ SAME DRUG ADON: CPT

## 2022-04-16 PROCEDURE — 36415 COLL VENOUS BLD VENIPUNCTURE: CPT

## 2022-04-16 RX ORDER — ONDANSETRON 2 MG/ML
4 INJECTION INTRAMUSCULAR; INTRAVENOUS EVERY 6 HOURS PRN
Status: DISCONTINUED | OUTPATIENT
Start: 2022-04-16 | End: 2022-04-21 | Stop reason: HOSPADM

## 2022-04-16 RX ORDER — SODIUM CHLORIDE 0.9 % (FLUSH) 0.9 %
5-40 SYRINGE (ML) INJECTION PRN
Status: DISCONTINUED | OUTPATIENT
Start: 2022-04-16 | End: 2022-04-21 | Stop reason: HOSPADM

## 2022-04-16 RX ORDER — SODIUM CHLORIDE 0.9 % (FLUSH) 0.9 %
5-40 SYRINGE (ML) INJECTION EVERY 12 HOURS SCHEDULED
Status: DISCONTINUED | OUTPATIENT
Start: 2022-04-16 | End: 2022-04-21 | Stop reason: HOSPADM

## 2022-04-16 RX ORDER — FENTANYL CITRATE 50 UG/ML
50 INJECTION, SOLUTION INTRAMUSCULAR; INTRAVENOUS ONCE
Status: COMPLETED | OUTPATIENT
Start: 2022-04-16 | End: 2022-04-16

## 2022-04-16 RX ORDER — 0.9 % SODIUM CHLORIDE 0.9 %
1000 INTRAVENOUS SOLUTION INTRAVENOUS ONCE
Status: COMPLETED | OUTPATIENT
Start: 2022-04-16 | End: 2022-04-16

## 2022-04-16 RX ORDER — ACETAMINOPHEN 325 MG/1
650 TABLET ORAL EVERY 4 HOURS PRN
Status: DISCONTINUED | OUTPATIENT
Start: 2022-04-16 | End: 2022-04-21 | Stop reason: HOSPADM

## 2022-04-16 RX ORDER — ACETAMINOPHEN 325 MG/1
650 TABLET ORAL EVERY 4 HOURS PRN
Status: CANCELLED | OUTPATIENT
Start: 2022-04-16

## 2022-04-16 RX ORDER — SODIUM CHLORIDE 9 MG/ML
INJECTION, SOLUTION INTRAVENOUS CONTINUOUS
Status: CANCELLED | OUTPATIENT
Start: 2022-04-16

## 2022-04-16 RX ORDER — BENZONATATE 100 MG/1
100 CAPSULE ORAL ONCE
Status: COMPLETED | OUTPATIENT
Start: 2022-04-16 | End: 2022-04-16

## 2022-04-16 RX ORDER — ONDANSETRON 4 MG/1
4 TABLET, ORALLY DISINTEGRATING ORAL EVERY 8 HOURS PRN
Status: DISCONTINUED | OUTPATIENT
Start: 2022-04-16 | End: 2022-04-21 | Stop reason: HOSPADM

## 2022-04-16 RX ORDER — 0.9 % SODIUM CHLORIDE 0.9 %
80 INTRAVENOUS SOLUTION INTRAVENOUS ONCE
Status: COMPLETED | OUTPATIENT
Start: 2022-04-16 | End: 2022-04-16

## 2022-04-16 RX ORDER — SODIUM CHLORIDE 0.9 % (FLUSH) 0.9 %
5-40 SYRINGE (ML) INJECTION EVERY 12 HOURS SCHEDULED
Status: CANCELLED | OUTPATIENT
Start: 2022-04-16

## 2022-04-16 RX ORDER — ACETAMINOPHEN 325 MG/1
650 TABLET ORAL EVERY 4 HOURS PRN
Status: DISCONTINUED | OUTPATIENT
Start: 2022-04-16 | End: 2022-04-16 | Stop reason: SDUPTHER

## 2022-04-16 RX ORDER — ONDANSETRON 4 MG/1
4 TABLET, ORALLY DISINTEGRATING ORAL EVERY 8 HOURS PRN
Status: CANCELLED | OUTPATIENT
Start: 2022-04-16

## 2022-04-16 RX ORDER — FENTANYL CITRATE 50 UG/ML
50 INJECTION, SOLUTION INTRAMUSCULAR; INTRAVENOUS
Status: DISCONTINUED | OUTPATIENT
Start: 2022-04-16 | End: 2022-04-17

## 2022-04-16 RX ORDER — ONDANSETRON 2 MG/ML
4 INJECTION INTRAMUSCULAR; INTRAVENOUS EVERY 6 HOURS PRN
Status: CANCELLED | OUTPATIENT
Start: 2022-04-16

## 2022-04-16 RX ORDER — SODIUM CHLORIDE 0.9 % (FLUSH) 0.9 %
10 SYRINGE (ML) INJECTION PRN
Status: DISCONTINUED | OUTPATIENT
Start: 2022-04-16 | End: 2022-04-21 | Stop reason: HOSPADM

## 2022-04-16 RX ORDER — FENTANYL CITRATE 50 UG/ML
50 INJECTION, SOLUTION INTRAMUSCULAR; INTRAVENOUS
Status: CANCELLED | OUTPATIENT
Start: 2022-04-16

## 2022-04-16 RX ORDER — FENTANYL CITRATE 50 UG/ML
50 INJECTION, SOLUTION INTRAMUSCULAR; INTRAVENOUS
Status: DISCONTINUED | OUTPATIENT
Start: 2022-04-16 | End: 2022-04-21

## 2022-04-16 RX ORDER — SODIUM CHLORIDE 9 MG/ML
INJECTION, SOLUTION INTRAVENOUS PRN
Status: CANCELLED | OUTPATIENT
Start: 2022-04-16

## 2022-04-16 RX ORDER — ONDANSETRON 2 MG/ML
4 INJECTION INTRAMUSCULAR; INTRAVENOUS EVERY 6 HOURS PRN
Status: DISCONTINUED | OUTPATIENT
Start: 2022-04-16 | End: 2022-04-16 | Stop reason: SDUPTHER

## 2022-04-16 RX ORDER — SODIUM CHLORIDE 9 MG/ML
INJECTION, SOLUTION INTRAVENOUS CONTINUOUS
Status: DISCONTINUED | OUTPATIENT
Start: 2022-04-16 | End: 2022-04-20

## 2022-04-16 RX ORDER — SODIUM CHLORIDE 0.9 % (FLUSH) 0.9 %
5-40 SYRINGE (ML) INJECTION PRN
Status: CANCELLED | OUTPATIENT
Start: 2022-04-16

## 2022-04-16 RX ORDER — SODIUM CHLORIDE 9 MG/ML
INJECTION, SOLUTION INTRAVENOUS CONTINUOUS
Status: DISCONTINUED | OUTPATIENT
Start: 2022-04-16 | End: 2022-04-16 | Stop reason: SDUPTHER

## 2022-04-16 RX ORDER — SODIUM CHLORIDE 9 MG/ML
INJECTION, SOLUTION INTRAVENOUS PRN
Status: DISCONTINUED | OUTPATIENT
Start: 2022-04-16 | End: 2022-04-21 | Stop reason: HOSPADM

## 2022-04-16 RX ORDER — FENTANYL CITRATE 50 UG/ML
100 INJECTION, SOLUTION INTRAMUSCULAR; INTRAVENOUS
Status: DISCONTINUED | OUTPATIENT
Start: 2022-04-16 | End: 2022-04-17

## 2022-04-16 RX ADMIN — FENTANYL CITRATE 50 MCG: 50 INJECTION, SOLUTION INTRAMUSCULAR; INTRAVENOUS at 18:20

## 2022-04-16 RX ADMIN — SODIUM CHLORIDE 1000 ML: 9 INJECTION, SOLUTION INTRAVENOUS at 19:18

## 2022-04-16 RX ADMIN — IOPAMIDOL 100 ML: 755 INJECTION, SOLUTION INTRAVENOUS at 18:31

## 2022-04-16 RX ADMIN — FENTANYL CITRATE 50 MCG: 50 INJECTION INTRAMUSCULAR; INTRAVENOUS at 20:45

## 2022-04-16 RX ADMIN — BENZONATATE 100 MG: 100 CAPSULE ORAL at 18:34

## 2022-04-16 RX ADMIN — FENTANYL CITRATE 50 MCG: 50 INJECTION, SOLUTION INTRAMUSCULAR; INTRAVENOUS at 17:51

## 2022-04-16 RX ADMIN — SODIUM CHLORIDE, PRESERVATIVE FREE 10 ML: 5 INJECTION INTRAVENOUS at 18:31

## 2022-04-16 RX ADMIN — SODIUM CHLORIDE: 9 INJECTION, SOLUTION INTRAVENOUS at 23:43

## 2022-04-16 RX ADMIN — SODIUM CHLORIDE 1000 ML: 9 INJECTION, SOLUTION INTRAVENOUS at 17:52

## 2022-04-16 RX ADMIN — CEFEPIME HYDROCHLORIDE 1000 MG: 1 INJECTION, POWDER, FOR SOLUTION INTRAMUSCULAR; INTRAVENOUS at 19:01

## 2022-04-16 RX ADMIN — SODIUM CHLORIDE: 9 INJECTION, SOLUTION INTRAVENOUS at 20:53

## 2022-04-16 RX ADMIN — SODIUM CHLORIDE 80 ML: 9 INJECTION, SOLUTION INTRAVENOUS at 18:31

## 2022-04-16 RX ADMIN — VANCOMYCIN HYDROCHLORIDE 2500 MG: 1 INJECTION, POWDER, LYOPHILIZED, FOR SOLUTION INTRAVENOUS at 19:56

## 2022-04-16 RX ADMIN — FENTANYL CITRATE 50 MCG: 50 INJECTION INTRAMUSCULAR; INTRAVENOUS at 23:07

## 2022-04-16 ASSESSMENT — ENCOUNTER SYMPTOMS
DIARRHEA: 0
VOMITING: 0
SORE THROAT: 0
TROUBLE SWALLOWING: 0
SHORTNESS OF BREATH: 1
SINUS PRESSURE: 0
SINUS PAIN: 0
FACIAL SWELLING: 0
CONSTIPATION: 0
NAUSEA: 1
ABDOMINAL PAIN: 1
BLOOD IN STOOL: 0
COLOR CHANGE: 1
CHEST TIGHTNESS: 1
COUGH: 1
WHEEZING: 0

## 2022-04-16 ASSESSMENT — PAIN SCALES - GENERAL
PAINLEVEL_OUTOF10: 10
PAINLEVEL_OUTOF10: 0
PAINLEVEL_OUTOF10: 9
PAINLEVEL_OUTOF10: 7
PAINLEVEL_OUTOF10: 10

## 2022-04-16 ASSESSMENT — PAIN DESCRIPTION - PAIN TYPE: TYPE: ACUTE PAIN

## 2022-04-16 ASSESSMENT — PAIN DESCRIPTION - DESCRIPTORS: DESCRIPTORS: DISCOMFORT

## 2022-04-16 ASSESSMENT — PAIN DESCRIPTION - ORIENTATION: ORIENTATION: MID

## 2022-04-16 ASSESSMENT — PAIN DESCRIPTION - LOCATION: LOCATION: ABDOMEN

## 2022-04-16 ASSESSMENT — PAIN DESCRIPTION - FREQUENCY: FREQUENCY: CONTINUOUS

## 2022-04-16 ASSESSMENT — PAIN - FUNCTIONAL ASSESSMENT: PAIN_FUNCTIONAL_ASSESSMENT: 0-10

## 2022-04-16 NOTE — ED PROVIDER NOTES
extremely large right-sided effusion with underlying pneumonia as well as a left chest wall hematoma [CD]   [employer] called- CT scan demonstrating L right pleural effusion, w/ R basilar consolidation. Large R rectus sheath hematoma 20t26zo no active bleeding.  [CD]   1913 Will reach out to internal medicine for admission. [CD]   80 Spoke with Dr. Anamaria Navarrete, plan for admission for intermediate ICU. She would like consult placed to interventional radiology. Also spoke to Dr. Laurie Molina from critical care. He is recommending to obtain MRSA swab and then discontinue the vancomycin. Also recommending placement of a central line prior to going upstairs. [CD]   2009 Patient's blood pressure does appear to be improving with the fluid. Will hold off on central line at this time. Patient and wife updated on plan of care, CT scan was shown to them, questions were answered [CD]   2021 Lactic Acid, Sepsis(!): 2.8 [CD]   2031 Repeat sepsis exam.  Patient conscious, alert, and oriented. Blood pressure improving. Residual extra fluid. Does not require pressors. Antibiotics infusing. Patient admitted to ICU. [AP]      ED Course User Index  [AP] Magy Weiss DO  [CD] Jorge Cardozo DO     Vitals:   Vitals:    04/16/22 1928 04/16/22 1937 04/16/22 1952 04/16/22 2017   BP:  91/70 102/61 116/71   Pulse: 102 103 99 104   Resp: 19 25 22 23   Temp:       TempSrc:       SpO2: 97% 98% 100% 97%         I personally saw and examined the patient. I have reviewed and agree with the resident's findings, including all diagnostic interpretations and treatment plan as written. I was present for the key portions of any procedures performed and the inclusive time noted for any critical care statement. The care is provided during an unprecedented national emergency due to the novel coronavirus, COVID 19.   Magy Weiss DO  Attending Emergency Physician           Magy Weiss DO  04/16/22 2032

## 2022-04-16 NOTE — ED PROVIDER NOTES
[facility] ED  Emergency Department Encounter  Emergency Medicine Resident     Pt Name: Mychal Grover  MRN: [id number]  [place] 1960  Date of evaluation: 4/16/22  PCP:  Red Pham MD    CHIEF COMPLAINT       Chief Complaint   Patient presents with    Cough       HISTORY [place]  (Location/Symptom, Timing/Onset, Context/Setting, Quality, Duration, Modifying Factors,Severity.)      Mychal Grover is a 59 y. o.yo male who presents with complaints of cough and shortness of breath. Patient states that he has seen his primary care provider multiple times for this cough that has been persistent for the last few weeks. He states it is gotten worse today and he became dizzy. Patient reports severe pain with the cough. He states the pain occurs every time he coughs or moves. He also noted bruising noted in the epigastric region. He states that he had no falls or injuries and he is not sure where the bruising came from. Patient does feel short of breath. He is also reporting of right upper quadrant abdominal pain. Patient is unable to lay flat secondary to the pain and shortness of breath. Patient admits to history of CHF presents CAD with stent placement as well as history of stroke in the past.    PAST MEDICAL / SURGICAL / SOCIAL / FAMILY HISTORY      has a past medical history of CAD (coronary artery disease), Cerebral artery occlusion with cerebral infarction ([address] CHF (congestive heart failure) ([address] Hx of heart artery stent, Hyperlipidemia, Hypertension, and MI (myocardial infarction) ([address] has a past surgical history that includes Appendectomy; Tonsillectomy; Cardiac surgery; Cardiac catheterization (09/13/2021); Cardiac catheterization; and Cardiac catheterization.      Social History     Socioeconomic History    Marital status:      Spouse name: Not on file    Number of children: Not on file    Years of education: Not on file    Highest education level: Not on file   Occupational History    Not on file   Tobacco Use    Smoking status: Former Smoker     Packs/day: 2.00     Years: 15.00     Pack years: 30.00     Types: Cigarettes     Start date:      Quit date:      Years since quittin.3    Smokeless tobacco: Never Used   Vaping Use    Vaping Use: Never used   Substance and Sexual Activity    Alcohol use: Yes     Comment: OCCAISIONALLY    Drug use: Never    Sexual activity: Yes     Partners: Female   Other Topics Concern    Not on file   Social History Narrative    Not on file     Social Determinants of Health     Financial Resource Strain: Low Risk     Difficulty of Paying Living Expenses: Not hard at all   Food Insecurity: No Food Insecurity    Worried About [employer] in the Last Year: Never true    [facility] [employer] in the Last Year: Never true   Transportation Needs:     Lack of Transportation (Medical): Not on file    Lack of Transportation (Non-Medical):  Not on file   Physical Activity:     Days of Exercise per Week: Not on file    Minutes of Exercise per Session: Not on file   Stress:     Feeling of Stress : Not on file   Social Connections:     Frequency of Communication with Friends and Family: Not on file    Frequency of Social Gatherings with Friends and Family: Not on file    Attends Anabaptism Services: Not on file    Active Member of [address] [place] [address] or Organizations: Not on file    Attends Club or Organization Meetings: Not on file    Marital Status: Not on file   Intimate Partner Violence:     Fear of Current or Ex-Partner: Not on file    Emotionally Abused: Not on file    Physically Abused: Not on file    Sexually Abused: Not on file   Housing Stability:     Unable to Pay for Housing in the Last Year: Not on file    Number of [place] in the Last Year: Not on file    Unstable Housing in the Last Year: Not on file       Family History   Problem Relation Age of Onset    Stroke Mother     Heart Disease Mother     Heart Disease Father     Diabetes Father     Stroke Father         Allergies:  Patient has no known allergies. Home Medications:  Prior to Admission medications    Medication Sig Start Date End Date Taking? Authorizing Provider   ketorolac (TORADOL) 10 MG tablet Take 1 tablet by mouth every 6 hours as needed for Pain 4/15/22 4/15/23  Doug Saxena MD   docusate sodium (COLACE) 100 MG capsule Take 1 capsule by mouth 2 times daily 4/15/22 5/15/22  Doug Saxena MD   furosemide (LASIX) 20 MG tablet take 1 tablet by mouth once daily 3/31/22   Historical Provider, MD   clopidogrel (PLAVIX) 75 MG tablet take 1 tablet by mouth once daily 3/31/22   Historical Provider, MD   atorvastatin (LIPITOR) 80 MG tablet Take 1 tablet by mouth nightly 9/14/21   NICKI Billy CNP   nitroGLYCERIN (NITROSTAT) 0.4 MG SL tablet Place 1 tablet under the tongue every 5 minutes as needed for Chest pain up to max of 3 total doses. If no relief after 1 dose, call 911. 9/14/21   NICKI Billy CNP   metoprolol tartrate (LOPRESSOR) 25 MG tablet Take 1 tablet by mouth 2 times daily 9/14/21   NICKI Billy CNP   losartan (COZAAR) 50 MG tablet Take 50 mg by mouth daily    Historical Provider, MD   aspirin 81 MG chewable tablet Take 1 tablet by mouth daily 4/20/21   Marciano Bernheim, MD       REVIEW OFSYSTEMS    (2-9 systems for level 4, 10 or more for level 5)      Review of Systems   Constitutional: Positive for appetite change. Negative for chills, diaphoresis, fatigue and fever. HENT: Positive for hearing loss. Negative for facial swelling, nosebleeds, sinus pressure, sinus pain, sore throat and trouble swallowing. Respiratory: Positive for cough, chest tightness and shortness of breath. Negative for wheezing. Cardiovascular: Positive for chest pain. Negative for palpitations and leg swelling. Gastrointestinal: Positive for abdominal pain and nausea.  Negative for blood in stool, constipation, diarrhea and vomiting. Endocrine: Negative for polydipsia, polyphagia and polyuria. Genitourinary: Negative for dysuria, flank pain and hematuria. Musculoskeletal: Negative for arthralgias, gait problem, neck pain and neck stiffness. Skin: Positive for color change and pallor. Negative for rash and wound. Neurological: Positive for dizziness and light-headedness. Negative for tremors, syncope, weakness and headaches. PHYSICAL EXAM   (up to 7 for level 4, 8 or more forlevel 5)      INITIAL VITALS:   ED Triage Vitals   BP Temp Temp Source Pulse Resp SpO2 Height Weight   04/16/22 1730 04/16/22 1735 04/16/22 1735 04/16/22 1730 04/16/22 1730 04/16/22 1730 -- --   (!) 61/51 97.8 °F (36.6 °C) Temporal 118 18 95 %         Physical Exam  Constitutional:       Appearance: He is obese. He is ill-appearing. He is not diaphoretic. HENT:      Head: Normocephalic and atraumatic. Right Ear: External ear normal.      Left Ear: External ear normal. There is no impacted cerumen. Nose: Nose normal.      Mouth/Throat:      Mouth: Mucous membranes are moist.      Pharynx: Oropharynx is clear. Eyes:      Extraocular Movements: Extraocular movements intact. Conjunctiva/sclera: Conjunctivae normal.      Pupils: Pupils are equal, round, and reactive to light. Cardiovascular:      Rate and Rhythm: Regular rhythm. Tachycardia present. Pulses: Normal pulses. Heart sounds: Normal heart sounds. Pulmonary:      Breath sounds: No stridor. No wheezing or rhonchi. Comments: Increased work of breathing, splinting with respiration  Chest:      Chest wall: Tenderness present. Abdominal:      General: There is distension. Tenderness: There is abdominal tenderness. There is guarding. Comments: Obese, distended abdomen, is soft. Tenderness to palpation in the epigastric and in the right upper quadrant. Musculoskeletal:         General: Normal range of motion.       Cervical back: Normal range of motion. Right lower leg: No edema. Left lower leg: No edema. Skin:     Capillary Refill: Capillary refill takes 2 to 3 seconds. Coloration: Skin is pale. Findings: Bruising present. Comments: Bruising noted to the epigastrium as well as the right lateral chest wall. Neurological:      General: No focal deficit present. Mental Status: He is oriented to person, place, and time.          DIFFERENTIAL  DIAGNOSIS     PLAN (LABS / IMAGING / EKG):  Orders Placed This Encounter   Procedures    Culture, Blood 1    Culture, Blood 2    Culture, Urine    COVID-19 & Influenza Combo    MRSA DNA Probe, Nasal    XR CHEST PORTABLE    CTA CHEST ABDOMEN PELVIS W CONTRAST    CBC with Auto Differential    Comprehensive Metabolic Panel w/ Reflex to MG    Lipase    Troponin    Brain Natriuretic Peptide    Protime-INR    APTT    Lactate, Sepsis    Urinalysis    Hemoglobin and Hematocrit    CBC with Auto Differential    Basic Metabolic Panel    Protime-INR    Hemoglobin and Hematocrit    Diet NPO Exceptions are: Ice Chips    Telemetry monitoring - continuous duration    Vital signs per unit routine    Notify physician    Notify physician    Telemetry monitoring - continuous duration    Up with assistance    Place intermittent pneumatic compression device    Place sequential compression device    Insert indwelling urinary catheter    Discontinue indwelling urinary catheter when documented indications no longer apply per hospital policy    Verify hospital blood product consent form has been signed and witnessed    Vital Signs For Blood Product Transfusion    Transfusion Reaction Management    Full Code    Pharmacy to Dose: Vancomycin    Inpatient consult to Internal Medicine    Inpatient consult to Critical Care    Inpatient consult to IR    Inpatient consult to General Surgery    EKG 12 Lead    TYPE AND SCREEN    TYPE AND SCREEN    PREPARE RBC (CROSSMATCH), 4 Units    ADMIT TO INPATIENT    ADMIT TO INPATIENT       MEDICATIONS ORDERED:  Orders Placed This Encounter   Medications    fentaNYL (SUBLIMAZE) injection 50 mcg    0.9 % sodium chloride bolus    fentaNYL (SUBLIMAZE) injection 50 mcg    benzonatate (TESSALON) capsule 100 mg    cefepime (MAXIPIME) 1000 mg IVPB minibag     Order Specific Question:   Antimicrobial Indications     Answer:   Sepsis of Unknown Etiology    0.9 % sodium chloride bolus    sodium chloride flush 0.9 % injection 10 mL    iopamidol (ISOVUE-370) 76 % injection 100 mL    0.9 % sodium chloride bolus    DISCONTD: vancomycin (VANCOCIN) 2,000 mg in dextrose 5 % 500 mL IVPB     Order Specific Question:   Antimicrobial Indications     Answer:   Sepsis of Unknown Etiology     Order Specific Question:   Antimicrobial Indications     Answer:   Pneumonia (CAP)    vancomycin (VANCOCIN) intermittent dosing (placeholder)     Order Specific Question:   Antimicrobial Indications     Answer:   Sepsis of Unknown Etiology    DISCONTD: vancomycin (VANCOCIN) 2,500 mg in dextrose 5 % 500 mL IVPB     Order Specific Question:   Antimicrobial Indications     Answer:   Sepsis of Unknown Etiology    DISCONTD: vancomycin (VANCOCIN) 750 mg in dextrose 5 % 250 mL IVPB     Order Specific Question:   Antimicrobial Indications     Answer:   Sepsis of Unknown Etiology    FOLLOWED BY Linked Order Group     vancomycin (VANCOCIN) 1,250 mg in dextrose 5 % 250 mL IVPB (ADDAVIAL)      Order Specific Question:   Antimicrobial Indications      Answer:   Sepsis of Unknown Etiology     vancomycin (VANCOCIN) 2,500 mg in dextrose 5 % 500 mL IVPB      Order Specific Question:   Antimicrobial Indications      Answer:   Sepsis of Unknown Etiology    sodium chloride flush 0.9 % injection 5-40 mL    sodium chloride flush 0.9 % injection 5-40 mL    0.9 % sodium chloride infusion    DISCONTD: acetaminophen (TYLENOL) tablet 650 mg    OR Linked Order Group    Li ondansetron (ZOFRAN-ODT) disintegrating tablet 4 mg     ondansetron (ZOFRAN) injection 4 mg    DISCONTD: 0.9 % sodium chloride infusion    fentaNYL (SUBLIMAZE) injection 50 mcg    0.9 % sodium chloride infusion    pantoprazole (PROTONIX) 40 mg in sodium chloride 0.9 % 50 mL bolus    DISCONTD: ondansetron (ZOFRAN) injection 4 mg    acetaminophen (TYLENOL) tablet 650 mg    fentaNYL (SUBLIMAZE) injection 50 mcg    fentaNYL (SUBLIMAZE) injection 100 mcg       DDX: Pneumonia, pleural effusion, aortic dissection, AAA, acute anemia, hemorrhagic shock, small bowel obstruction, ACS, pulmonary embolus, NSTEMI, hemothorax    Initial MDM/Plan: 59 y.o. male who presents with concerns of cough that has been persistent with severe pain. Patient reports severe chest pain and right upper quadrant pain with movement and cough, he does have increased work of breathing. Triage vital signs were concerning for hypotension with blood pressure in the 60s he patient was tachycardic appears pale. He was initially brought back to the room. There is concern for acute anemia. Bedside FAST exam was negative for free fluid in the abdomen, there was an abnormality in the right upper quadrant. Patient increased work of breathing. We will plan for broad based work-up including CTA chest abdomen pelvis to rule out AAA. Given the patient's body habitus it is difficult to visualize aorta on ultrasound.     DIAGNOSTIC RESULTS / EMERGENCYDEPARTMENT COURSE / MDM     LABS:  Labs Reviewed   CBC WITH AUTO DIFFERENTIAL - Abnormal; Notable for the following components:       Result Value    WBC 21.3 (*)     RBC 3.77 (*)     Hemoglobin 10.7 (*)     Hematocrit 31.0 (*)     Seg Neutrophils 75 (*)     Lymphocytes 19 (*)     Segs Absolute 15.97 (*)     Basophils Absolute 0.21 (*)     All other components within normal limits   COMPREHENSIVE METABOLIC PANEL W/ REFLEX TO MG FOR LOW K - Abnormal; Notable for the following components:    Glucose 130 (*) BUN 35 (*)     CREATININE 1.86 (*)     Sodium 133 (*)     Chloride 97 (*)     Total Protein 6.2 (*)     GFR Non- 37 (*)     GFR  45 (*)     All other components within normal limits   LACTATE, SEPSIS - Abnormal; Notable for the following components:    Lactic Acid, Sepsis 2.8 (*)     All other components within normal limits   HEMOGLOBIN AND HEMATOCRIT - Abnormal; Notable for the following components:    Hemoglobin 8.8 (*)     Hematocrit 27.2 (*)     All other components within normal limits   COVID-19 & INFLUENZA COMBO   CULTURE, BLOOD 1   CULTURE, BLOOD 2   CULTURE, URINE   MRSA DNA PROBE, NASAL   LIPASE   TROPONIN   TROPONIN   BRAIN NATRIURETIC PEPTIDE   PROTIME-INR   APTT   LACTATE, SEPSIS   URINALYSIS   CBC WITH AUTO DIFFERENTIAL   BASIC METABOLIC PANEL   PROTIME-INR   HEMOGLOBIN AND HEMATOCRIT   TYPE AND SCREEN   TYPE AND SCREEN   PREPARE RBC (CROSSMATCH)         RADIOLOGY:  XR CHEST PORTABLE    Result Date: 4/16/2022  EXAMINATION: ONE XRAY VIEW OF THE CHEST 4/16/2022 2:49 pm COMPARISON: March 14 2022 HISTORY: ORDERING SYSTEM PROVIDED HISTORY: hypotension, SOB TECHNOLOGIST PROVIDED HISTORY: hypotension, SOB Reason for Exam: hypotension, SOB FINDINGS: Marginal inspiration is noted. A large right pleural effusion is noted, left basilar opacification, likely due to a combination of effusion, atelectasis and or consolidation. Cardiomegaly is noted. Vascular markings are distinct. No pneumothorax noted. No acute osseous abnormalities present. 1. Large right pleural effusion 2. Right basilar opacification, which may be due to a combination of atelectasis, effusion and or consolidation.      CTA CHEST ABDOMEN PELVIS W CONTRAST    Result Date: 4/16/2022  EXAMINATION: CTA OF THE CHEST, ABDOMEN AND PELVIS WITH CONTRAST 4/16/2022 3:30 pm: TECHNIQUE: CTA of the chest, abdomen and pelvis was performed after the administration of intravenous contrast.  Multiplanar reformatted images are provided for review. MIP images are provided for review. Dose modulation, iterative reconstruction, and/or weight based adjustment of the mA/kV was utilized to reduce the radiation dose to as low as reasonably achievable. COMPARISON: Chest x-ray dated April 16, 2022 HISTORY: ORDERING SYSTEM PROVIDED HISTORY: hypotension, Chest pain, r/o disection TECHNOLOGIST PROVIDED HISTORY: hypotension, Chest pain, r/o disection Decision Support Exception - unselect if not a suspected or confirmed emergency medical condition->Emergency Medical Condition (MA) Reason for Exam: hypotension, Chest pain, r/o disection Relevant Medical/Surgical History: chf, stents, cardiac cath, appendectomy FINDINGS: CTA CHEST: Thoracic aorta: No evidence of thoracic aortic aneurysm or dissection. No acute abnormality of the aorta. Mediastinum: No adenopathy is present. Normal enhancement of the pulmonary arterial system is present. Extensive coronary arterial calcifications are noted. Small pericardial effusion is present. Fatty changes are present involving the apex of the left ventricle, related to prior infarct. Lungs/Pleura: A large right pleural effusion is present, with compressive atelectasis involving the right lower lobe. Superimposed infection is difficult to exclude. The trachea and mainstem bronchi appear normal. Paraseptal emphysema is present within the right apex. Minimal patchy airspace disease is present within the left upper lobe, likely infectious in etiology. Soft Tissues/Bones: No acute osseous abnormality is present involving the thorax. Asymmetric enlargement of the right serratus anterior muscle is present, compared to the left. This may represent intramuscular hematoma or inflammation/myositis. CTA ABDOMEN: Abdominal aorta/Branches: Normal enhancement of the abdominal aorta and branch vessels is present without evidence of aneurysm formation or dissection. Organs: Hepatic steatosis is present.   The gallbladder, pancreas, spleen, adrenal glands, and kidneys demonstrate no acute abnormality. GI/Bowel: Stomach appears normal.  Small bowel appears normal without evidence of obstruction. No evidence of appendicitis is present. Scattered colonic diverticula, without evidence of diverticulitis. Peritoneum/Retroperitoneum: No adenopathy is present. No free fluid is present within the abdomen. No free air is noted. Bones/Soft Tissues: No acute osseous abnormality is present involving the abdomen. Marked enlargement of the right rectus sheath and rectus abdominus muscle is present, consistent with a large rectus sheath hematoma. Hematoma extends superiorly, along the anterolateral right chest wall, likely involving the serrated anterior muscle is well. Right rectus sheath hematoma measures approximately 15-16 cm in transverse dimension, 4-5 cm in AP dimension, and approximately 16 cm in cephalocaudal dimension. No evidence of active bleeding is present. Soft tissue stranding is present throughout the subcutaneous fat of the right flank and anterior abdominal wall. CTA PELVIS: Aorta/Iliacs: Normal enhancement of the aortic bifurcation, common iliac, internal and external iliac arteries common femoral arteries is noted bilaterally without evidence of aneurysm formation or dissection. Other: Urinary bladder is nondistended. Prostate gland is normal size. No free fluid or free air is present within the pelvis. Bones/Soft Tissues: No acute osseous abnormality is present involving the pelvis. A small umbilical hernia is present containing fat. 1. No evidence of dissection, aneurysm formation, or intramural hematoma formation involving the thoracic or abdominal aorta 2. Large right pleural effusion, with atelectasis involving the right lower lobe. Superimposed infection is difficult to exclude 3.  Large right rectus sheath hematoma, measuring approximately 15-16 cm in transverse dimension, 4-5 cm in AP dimension, and approximately 16 cm in cephalocaudal dimension 4. Asymmetric enlargement of the right serratus anterior muscle alyce may related to intramuscular hematoma formation, or myositis. 5.  Critical results were called by Dr. Daniela Jimenes to Dr. Lucero Argueta on 4/16/2022 at 7:11 p.m. hours. EKG      All EKG's are interpreted by the Emergency Department Physicianwho either signs or Co-signs this chart in the absence of a cardiologist.    EMERGENCY DEPARTMENT COURSE:  ED Course as of 04/16/22 2142   Sat Apr 16, 2022   1746 Patient seen immediately on room arrival.  Vital signs in triage show hypotension tachycardia. Hooked up to monitor. Repeat blood pressure slightly improved. Bedside FAST exam negative [CD]   1756 WBC(!): 21.3 [CD]   1756 Concern for infectious process, will initate sepsis work up  [CD]   1758 Hemoglobin Quant(!): 10.7 [CD]   1808 Repeat FAST exam completed by myself negative for intra-abdominal fluid but did demonstrate a fair amount of fluid in the right thoracic space, concerning for a large pleural effusion as demonstrated on x-ray [AP]   5244 Will not wait for patient's renal function to result. Blood pressure becoming lower despite fluid resuscitation. High concern for hemorrhagic shock of unknown etiology at this point, will go directly to CTA [AP]   6838 ISA noted Cr 1.86 [CD]   1816 Concern for septic shock at this point given patient's pressure heart rate and lab work. There does appear to be a large right-sided pleural effusion. Will start broad-spectrum antibiotics. Patient taken to CT scanner [CD]   1835 CT scan appears to have extremely large right-sided effusion with underlying pneumonia as well as a left chest wall hematoma [CD]   2972 [facility] called- CT scan demonstrating L right pleural effusion, w/ R basilar consolidation. Large R rectus sheath hematoma [id number] no active bleeding.  [CD]   1913 Will reach out to internal medicine for admission.  [CD] lactate >  4.0 OR hypotension (MAP<65 mmHg) (with 2 BP readings) 30ml/kg crystalloid MUST be ordered. Must have documented in the medical record:   Administration of 30 mL/kg of crystalloid fluids would be detrimental or harmful for the patient;    AND that the patient has one of the following conditions, OR that a portion of the crystalloid fluid volume was administered as colloids (if a portion consisted of colloids, there must be an order and documentation that colloids were started or noted as given);  o Patient responded to a lesser amount. (If a portion consisted of colloids, there must be an order and documentation that colloids were started or noted as given.)   o the volume of crystalloid fluids in place of 30 mL/kg the patient was to receive is 2,000mL, (Order for this amount of fluid must be placed)     Fluids must be initiated within 3 hours of sepsis identification. These fluids must have a rate > 125 ml/hr. · Is the patient Morbidly Obese (BMI > 30): Morbidly obese, ordering 30ml/kg fluid bolus based on IBW of 132 kg  · IV Fluids given prior to arrival can be used in this calculation but the following information must be documented:  Start time: 1918, Type of fluid 0.9 NS, Volume of fluid 2,000L, and Rate (Duration or End time) 999cc/hr. · Does the patient or patient advocate refuse the entire 30 ml/kg IV fluid bolus? No      Septic Shock Identified (Initial lactate > 4.0 or 2 Hypotensive Blood Pressure readings within the first 6 hours): For septic shock sepsis focus physical exam must be completed AND documented (within 6 hours). Date: 4/16/2022 Time: 1756      Sepsis focus exam completed.     For persistent hypotension after 30ml/kg fluid bolus vasopressors must be started (within 6 hours)      PROCEDURES:  None    CONSULTS:  PHARMACY TO DOSE VANCOMYCIN  IP CONSULT TO INTERNAL MEDICINE  IP CONSULT TO CRITICAL CARE  IP CONSULT TO INTERVENTIONAL RADIOLOGY  IP CONSULT TO GENERAL SURGERY    CRITICAL CARE:  Please see attending documentation    FINAL IMPRESSION      1. Pleural effusion    2. Sepsis with acute renal failure and septic shock, due to unspecified organism, unspecified acute renal failure type ([facility]    3. Hematoma of rectus sheath, initial encounter    4. ISA (acute kidney injury) ([facility]          DISPOSITION / PLAN     DISPOSITION    Admitted     PATIENT REFERRED TO:  No follow-up provider specified.     DISCHARGE MEDICATIONS:  New Prescriptions    No medications on file       Herson Chaparro DO  Emergency Medicine Resident    (Please note that portions of this note were completed with a voice recognition program.Efforts were made to edit the dictations but occasionally words are mis-transcribed.)        Herson Chaparro DO  Resident  04/16/22 [address] Chelsi Berger  04/16/22 4109

## 2022-04-16 NOTE — Clinical Note
Discharge Plan[de-identified] Home with Office Follow-up   Telemetry/Cardiac Monitoring Required?: Yes   Bed request comments: Intermediate status

## 2022-04-16 NOTE — PROGRESS NOTES
Vancomycin Dosing by Pharmacy - Initial Note   TODAY'S DATE:  4/16/2022  Patient name, age:  Lissette Vazquez, 59 y.o. Indication: Sepsis of unknown origin, empiric  Additional antimicrobials:  Cefepime once in ED, no continuing doses at this time. Allergies:  Patient has no known allergies. Actual Weight:    Wt Readings from Last 1 Encounters:   04/15/22 292 lb (132.5 kg)     Labs/Ancillary Data  Estimated Creatinine Clearance: 58 mL/min (A) (based on SCr of 1.86 mg/dL (H)). Recent Labs     04/16/22  1745   CREATININE 1.86*   BUN 35*   WBC 21.3*     No results found for: PROCAL    Intake/Output Summary (Last 24 hours) at 4/16/2022 1937  Last data filed at 4/16/2022 1906  Gross per 24 hour   Intake 1050 ml   Output --   Net 1050 ml     Temp: 97.8    Recent vancomycin administrations        No vancomycin IV orders with administrations found. Orders not given:            vancomycin (VANCOCIN) intermittent dosing (placeholder)    vancomycin (VANCOCIN) 2,500 mg in dextrose 5 % 500 mL IVPB    vancomycin (VANCOCIN) 750 mg in dextrose 5 % 250 mL IVPB                  Culture Date / Source  /  Results  See Micro    MRSA Nasal Swab: was ordered by provider, awaiting results. Keara Cedeno PLAN   Initial loading dose of 25mg/kg (max of 2,500 mg) = 2500  mg  x 1, then  vancomycin 1250 mg IV every 24 hours. Ensured BUN/sCr ordered at baseline and every 48 hours x at least 3 levels, then at least weekly. Vancomycin level ordered for TBD.       Vancomycin Target Concentration Parameters  Treatment  Population Target AUC/ALLYSSA Target Trough   Invasive MRSA Infection (bacteremia, pneumonia, meningitis, endocarditis, osteomyelitis)  Sepsis (undifferentiated) 400-600 N/A   Infection due to non-MRSA pathogen  Empiric treatment of non-invasive MRSA infection  (SSTI, UTI) <500 10-15 mg/L   CrCl < 29 mL/min  Rapidly fluctuating serum creatinine   ISA N/A < 15 mg/L     Renal replacement therapy is dosed by levels, per hospital protocol. Abbreviations  * Pauc: probability that AUC is >400 (efficacy); Pconc: probability that Ctrough is above 20 ?g/mL (toxicity); Tox: Probability of nephrotoxicity, based on Marley et al. Clin Infect Dis 2009. Loading dose: 2500 mg at 20:00 04/16/2022. Regimen: 1250 mg IV every 24 hours. Start time: 19:43 on 04/16/2022  Exposure target: Ctrough10-15 mg/L   AUC24,ss: 398 mg/L.hr  Probability of AUC24 > 400: 50 %  Ctrough,ss: 12.6 mg/L  Probability of Ctrough,ss > 20: 14 %  Probability of nephrotoxicity (Marley CJ 2009): 8 %      Thank you for the consult. Pharmacy will continue to follow.      Thank you,  Luc GoodmanD, [facility]  PGY-1 Pharmacy Resident

## 2022-04-16 NOTE — ED NOTES
Bedside report completed with [facility], RN. Provider at bedside for results update.      Dipesh Haddad RN  04/16/22 6519

## 2022-04-16 NOTE — ED NOTES
Pt is brought to this ER by family with c/o severe midepigastric pain that started this past Wednesday as a \"coughing fit\". Pt states the pain does not hurt at rest but is aggravated with movement. Pt states the pain has increased in severity until it is unbearable today when he coughs. Pt arrives A+O x 4, GCS = 15, PMS x 4 intact, eupneic, and PWD. Pt's pulse is tachycardic and regular. Bruising noted to pt's epigastric area that is tender to light touch. Pt is having appropriate conversation with this nurse. Slight bilat lower leg edema noted.      Nikita More, RN  04/16/22 1946

## 2022-04-17 PROBLEM — N17.9 AKI (ACUTE KIDNEY INJURY) (HCC): Status: ACTIVE | Noted: 2022-04-17

## 2022-04-17 PROBLEM — I10 PRIMARY HYPERTENSION: Status: ACTIVE | Noted: 2022-04-17

## 2022-04-17 LAB
ABSOLUTE EOS #: 0 K/UL (ref 0–0.4)
ABSOLUTE LYMPH #: 2 K/UL (ref 1–4.8)
ABSOLUTE MONO #: 1.4 K/UL (ref 0.1–1.3)
ADENOVIRUS PCR: NOT DETECTED
ANION GAP SERPL CALCULATED.3IONS-SCNC: 10 MMOL/L (ref 9–17)
BASOPHILS # BLD: 0 % (ref 0–2)
BASOPHILS ABSOLUTE: 0 K/UL (ref 0–0.2)
BORDETELLA PARAPERTUSSIS: NOT DETECTED
BORDETELLA PERTUSSIS PCR: NOT DETECTED
BUN BLDV-MCNC: 34 MG/DL (ref 8–23)
CALCIUM SERPL-MCNC: 7.7 MG/DL (ref 8.6–10.4)
CHLAMYDIA PNEUMONIAE BY PCR: NOT DETECTED
CHLORIDE BLD-SCNC: 101 MMOL/L (ref 98–107)
CO2: 24 MMOL/L (ref 20–31)
CORONAVIRUS 229E PCR: NOT DETECTED
CORONAVIRUS HKU1 PCR: NOT DETECTED
CORONAVIRUS NL63 PCR: NOT DETECTED
CORONAVIRUS OC43 PCR: NOT DETECTED
CREAT SERPL-MCNC: 1.27 MG/DL (ref 0.7–1.2)
EOSINOPHILS RELATIVE PERCENT: 0 % (ref 0–4)
FERRITIN: 153 NG/ML (ref 30–400)
GFR AFRICAN AMERICAN: >60 ML/MIN
GFR NON-AFRICAN AMERICAN: 57 ML/MIN
GFR SERPL CREATININE-BSD FRML MDRD: ABNORMAL ML/MIN/{1.73_M2}
GLUCOSE BLD-MCNC: 139 MG/DL (ref 70–99)
HCT VFR BLD CALC: 23.3 % (ref 41–53)
HCT VFR BLD CALC: 23.5 % (ref 41–53)
HCT VFR BLD CALC: 23.6 % (ref 41–53)
HCT VFR BLD CALC: 25.1 % (ref 41–53)
HEMOGLOBIN: 7.7 G/DL (ref 13.5–17.5)
HEMOGLOBIN: 7.9 G/DL (ref 13.5–17.5)
HEMOGLOBIN: 8 G/DL (ref 13.5–17.5)
HEMOGLOBIN: 8.3 G/DL (ref 13.5–17.5)
HUMAN METAPNEUMOVIRUS PCR: NOT DETECTED
INFLUENZA A BY PCR: NOT DETECTED
INFLUENZA B BY PCR: NOT DETECTED
INR BLD: 1.2
IRON SATURATION: 11 % (ref 20–55)
IRON: 23 UG/DL (ref 59–158)
LYMPHOCYTES # BLD: 15 % (ref 24–44)
MCH RBC QN AUTO: 27.6 PG (ref 26–34)
MCHC RBC AUTO-ENTMCNC: 33 G/DL (ref 31–37)
MCV RBC AUTO: 83.7 FL (ref 80–100)
MONOCYTES # BLD: 10 % (ref 1–7)
MRSA, DNA, NASAL: NEGATIVE
MYCOPLASMA PNEUMONIAE PCR: NOT DETECTED
PARAINFLUENZA 1 PCR: NOT DETECTED
PARAINFLUENZA 2 PCR: NOT DETECTED
PARAINFLUENZA 3 PCR: NOT DETECTED
PARAINFLUENZA 4 PCR: NOT DETECTED
PDW BLD-RTO: 13.6 % (ref 11.5–14.9)
PLATELET # BLD: 268 K/UL (ref 150–450)
PMV BLD AUTO: 7 FL (ref 6–12)
POTASSIUM SERPL-SCNC: 4 MMOL/L (ref 3.7–5.3)
PROCALCITONIN: 0.11 NG/ML
PROTHROMBIN TIME: 15.2 SEC (ref 11.8–14.6)
RBC # BLD: 3 M/UL (ref 4.5–5.9)
RESP SYNCYTIAL VIRUS PCR: NOT DETECTED
RHINO/ENTEROVIRUS PCR: NOT DETECTED
SARS-COV-2, PCR: NOT DETECTED
SEG NEUTROPHILS: 75 % (ref 36–66)
SEGMENTED NEUTROPHILS ABSOLUTE COUNT: 10.1 K/UL (ref 1.3–9.1)
SODIUM BLD-SCNC: 135 MMOL/L (ref 135–144)
SPECIMEN DESCRIPTION: NORMAL
SPECIMEN DESCRIPTION: NORMAL
TOTAL IRON BINDING CAPACITY: 201 UG/DL (ref 250–450)
UNSATURATED IRON BINDING CAPACITY: 178 UG/DL (ref 112–347)
WBC # BLD: 13.5 K/UL (ref 3.5–11)

## 2022-04-17 PROCEDURE — 83540 ASSAY OF IRON: CPT

## 2022-04-17 PROCEDURE — 6360000002 HC RX W HCPCS: Performed by: INTERNAL MEDICINE

## 2022-04-17 PROCEDURE — 83550 IRON BINDING TEST: CPT

## 2022-04-17 PROCEDURE — 99223 1ST HOSP IP/OBS HIGH 75: CPT | Performed by: INTERNAL MEDICINE

## 2022-04-17 PROCEDURE — 2580000003 HC RX 258: Performed by: SURGERY

## 2022-04-17 PROCEDURE — 6360000002 HC RX W HCPCS: Performed by: STUDENT IN AN ORGANIZED HEALTH CARE EDUCATION/TRAINING PROGRAM

## 2022-04-17 PROCEDURE — 2060000000 HC ICU INTERMEDIATE R&B

## 2022-04-17 PROCEDURE — 82728 ASSAY OF FERRITIN: CPT

## 2022-04-17 PROCEDURE — 2580000003 HC RX 258: Performed by: STUDENT IN AN ORGANIZED HEALTH CARE EDUCATION/TRAINING PROGRAM

## 2022-04-17 PROCEDURE — A4216 STERILE WATER/SALINE, 10 ML: HCPCS | Performed by: SURGERY

## 2022-04-17 PROCEDURE — 85610 PROTHROMBIN TIME: CPT

## 2022-04-17 PROCEDURE — 2580000003 HC RX 258: Performed by: INTERNAL MEDICINE

## 2022-04-17 PROCEDURE — 0202U NFCT DS 22 TRGT SARS-COV-2: CPT

## 2022-04-17 PROCEDURE — 85014 HEMATOCRIT: CPT

## 2022-04-17 PROCEDURE — 6370000000 HC RX 637 (ALT 250 FOR IP): Performed by: INTERNAL MEDICINE

## 2022-04-17 PROCEDURE — 84145 PROCALCITONIN (PCT): CPT

## 2022-04-17 PROCEDURE — C9113 INJ PANTOPRAZOLE SODIUM, VIA: HCPCS | Performed by: SURGERY

## 2022-04-17 PROCEDURE — 36415 COLL VENOUS BLD VENIPUNCTURE: CPT

## 2022-04-17 PROCEDURE — 2580000003 HC RX 258

## 2022-04-17 PROCEDURE — 85025 COMPLETE CBC W/AUTO DIFF WBC: CPT

## 2022-04-17 PROCEDURE — 85018 HEMOGLOBIN: CPT

## 2022-04-17 PROCEDURE — 6370000000 HC RX 637 (ALT 250 FOR IP): Performed by: STUDENT IN AN ORGANIZED HEALTH CARE EDUCATION/TRAINING PROGRAM

## 2022-04-17 PROCEDURE — 80048 BASIC METABOLIC PNL TOTAL CA: CPT

## 2022-04-17 PROCEDURE — 6360000002 HC RX W HCPCS: Performed by: SURGERY

## 2022-04-17 PROCEDURE — 6370000000 HC RX 637 (ALT 250 FOR IP)

## 2022-04-17 RX ORDER — DEXTROMETHORPHAN POLISTIREX 30 MG/5ML
60 SUSPENSION ORAL EVERY 12 HOURS SCHEDULED
Status: DISCONTINUED | OUTPATIENT
Start: 2022-04-17 | End: 2022-04-21 | Stop reason: HOSPADM

## 2022-04-17 RX ORDER — LOSARTAN POTASSIUM 50 MG/1
50 TABLET ORAL DAILY
Status: DISCONTINUED | OUTPATIENT
Start: 2022-04-18 | End: 2022-04-17

## 2022-04-17 RX ORDER — OXYCODONE HYDROCHLORIDE AND ACETAMINOPHEN 5; 325 MG/1; MG/1
1 TABLET ORAL EVERY 4 HOURS PRN
Status: DISCONTINUED | OUTPATIENT
Start: 2022-04-17 | End: 2022-04-21

## 2022-04-17 RX ORDER — LOSARTAN POTASSIUM 50 MG/1
50 TABLET ORAL DAILY
Status: DISCONTINUED | OUTPATIENT
Start: 2022-04-17 | End: 2022-04-21 | Stop reason: HOSPADM

## 2022-04-17 RX ORDER — BENZONATATE 200 MG/1
200 CAPSULE ORAL 3 TIMES DAILY
Status: DISCONTINUED | OUTPATIENT
Start: 2022-04-17 | End: 2022-04-21 | Stop reason: HOSPADM

## 2022-04-17 RX ORDER — FERROUS SULFATE 325(65) MG
325 TABLET ORAL EVERY OTHER DAY
Status: DISCONTINUED | OUTPATIENT
Start: 2022-04-17 | End: 2022-04-21 | Stop reason: HOSPADM

## 2022-04-17 RX ADMIN — Medication 60 MG: at 07:30

## 2022-04-17 RX ADMIN — FENTANYL CITRATE 50 MCG: 50 INJECTION INTRAMUSCULAR; INTRAVENOUS at 23:33

## 2022-04-17 RX ADMIN — OXYCODONE HYDROCHLORIDE AND ACETAMINOPHEN 1 TABLET: 5; 325 TABLET ORAL at 11:08

## 2022-04-17 RX ADMIN — OXYCODONE HYDROCHLORIDE AND ACETAMINOPHEN 1 TABLET: 5; 325 TABLET ORAL at 19:19

## 2022-04-17 RX ADMIN — SODIUM CHLORIDE, PRESERVATIVE FREE 10 ML: 5 INJECTION INTRAVENOUS at 09:40

## 2022-04-17 RX ADMIN — FENTANYL CITRATE 50 MCG: 50 INJECTION, SOLUTION INTRAMUSCULAR; INTRAVENOUS at 10:11

## 2022-04-17 RX ADMIN — FENTANYL CITRATE 50 MCG: 50 INJECTION INTRAMUSCULAR; INTRAVENOUS at 18:46

## 2022-04-17 RX ADMIN — BENZONATATE 200 MG: 200 CAPSULE ORAL at 20:59

## 2022-04-17 RX ADMIN — FENTANYL CITRATE 100 MCG: 50 INJECTION, SOLUTION INTRAMUSCULAR; INTRAVENOUS at 07:42

## 2022-04-17 RX ADMIN — Medication 5 ML: at 13:22

## 2022-04-17 RX ADMIN — FENTANYL CITRATE 50 MCG: 50 INJECTION INTRAMUSCULAR; INTRAVENOUS at 21:00

## 2022-04-17 RX ADMIN — SODIUM CHLORIDE 40 MG: 9 INJECTION, SOLUTION INTRAMUSCULAR; INTRAVENOUS; SUBCUTANEOUS at 07:31

## 2022-04-17 RX ADMIN — Medication 60 MG: at 20:59

## 2022-04-17 RX ADMIN — FERROUS SULFATE TAB 325 MG (65 MG ELEMENTAL FE) 325 MG: 325 (65 FE) TAB at 13:20

## 2022-04-17 RX ADMIN — SODIUM CHLORIDE, PRESERVATIVE FREE 10 ML: 5 INJECTION INTRAVENOUS at 21:00

## 2022-04-17 RX ADMIN — BENZONATATE 200 MG: 200 CAPSULE ORAL at 07:30

## 2022-04-17 RX ADMIN — Medication 5 ML: at 18:35

## 2022-04-17 RX ADMIN — SODIUM CHLORIDE: 9 INJECTION, SOLUTION INTRAVENOUS at 18:37

## 2022-04-17 RX ADMIN — LOSARTAN POTASSIUM 50 MG: 50 TABLET, FILM COATED ORAL at 16:05

## 2022-04-17 RX ADMIN — SODIUM CHLORIDE: 9 INJECTION, SOLUTION INTRAVENOUS at 05:51

## 2022-04-17 RX ADMIN — VANCOMYCIN HYDROCHLORIDE 1500 MG: 1.5 INJECTION, POWDER, LYOPHILIZED, FOR SOLUTION INTRAVENOUS at 11:11

## 2022-04-17 RX ADMIN — FENTANYL CITRATE 50 MCG: 50 INJECTION, SOLUTION INTRAMUSCULAR; INTRAVENOUS at 04:42

## 2022-04-17 RX ADMIN — BENZONATATE 200 MG: 200 CAPSULE ORAL at 13:20

## 2022-04-17 RX ADMIN — OXYCODONE HYDROCHLORIDE AND ACETAMINOPHEN 1 TABLET: 5; 325 TABLET ORAL at 14:53

## 2022-04-17 ASSESSMENT — ENCOUNTER SYMPTOMS
GASTROINTESTINAL NEGATIVE: 1
CHEST TIGHTNESS: 0
SHORTNESS OF BREATH: 0
EYES NEGATIVE: 1
STRIDOR: 0
ABDOMINAL PAIN: 0
CHOKING: 0
WHEEZING: 0
VOMITING: 0
CONSTIPATION: 0
RESPIRATORY NEGATIVE: 1
APNEA: 0
COUGH: 0
NAUSEA: 0
ABDOMINAL DISTENTION: 0
ALLERGIC/IMMUNOLOGIC NEGATIVE: 1
DIARRHEA: 0

## 2022-04-17 ASSESSMENT — PAIN DESCRIPTION - PAIN TYPE
TYPE: ACUTE PAIN
TYPE: ACUTE PAIN

## 2022-04-17 ASSESSMENT — PAIN DESCRIPTION - FREQUENCY
FREQUENCY: CONTINUOUS
FREQUENCY: CONTINUOUS

## 2022-04-17 ASSESSMENT — PAIN SCALES - GENERAL
PAINLEVEL_OUTOF10: 4
PAINLEVEL_OUTOF10: 4
PAINLEVEL_OUTOF10: 8
PAINLEVEL_OUTOF10: 6
PAINLEVEL_OUTOF10: 2
PAINLEVEL_OUTOF10: 8
PAINLEVEL_OUTOF10: 6
PAINLEVEL_OUTOF10: 7
PAINLEVEL_OUTOF10: 8
PAINLEVEL_OUTOF10: 8
PAINLEVEL_OUTOF10: 0
PAINLEVEL_OUTOF10: 4
PAINLEVEL_OUTOF10: 4
PAINLEVEL_OUTOF10: 7
PAINLEVEL_OUTOF10: 0
PAINLEVEL_OUTOF10: 9

## 2022-04-17 ASSESSMENT — PAIN DESCRIPTION - LOCATION
LOCATION: ABDOMEN
LOCATION: ABDOMEN

## 2022-04-17 ASSESSMENT — PAIN DESCRIPTION - DESCRIPTORS
DESCRIPTORS: ACHING;DISCOMFORT
DESCRIPTORS: ACHING

## 2022-04-17 ASSESSMENT — PAIN DESCRIPTION - ORIENTATION
ORIENTATION: RIGHT;LOWER
ORIENTATION: RIGHT;LOWER

## 2022-04-17 NOTE — PLAN OF CARE
Problem: Pain:  Goal: Pain level will decrease  Description: Pain level will decrease  Outcome: Ongoing  Note: Pt c/o chest pain and was medicated times 1. Repositioned for comfort as needed. Restful environment provided      Problem: Gas Exchange - Impaired:  Goal: Levels of oxygenation will improve  Description: Levels of oxygenation will improve  Outcome: Ongoing  Note: Pt on 2l/nc. No resp distress noted.

## 2022-04-17 NOTE — FLOWSHEET NOTE
04/17/22 1348   Encounter Summary   Services provided to: Patient not available  (Staff with patient)

## 2022-04-17 NOTE — PROCEDURES
PROCEDURE NOTE - CENTRAL VENOUS LINE PLACEMENT    [place] [facility] [place]. [id number]  DATE: 4/16/2022  ATTENDING PHYSICIAN: [place]    PREOPERATIVE DIAGNOSIS:  vascular access and hypovolemia  POSTOPERATIVE DIAGNOSIS:  Same  PROCEDURE PERFORMED:  Right Femoral Vein Central Line Insertion  PERFORMING PHYSICIAN: Jessica Bowie DO  ANESTHESIA:  Local utilizing 1% lidocaine  ESTIMATED BLOOD LOSS:  Less than 25 ml  COMPLICATIONS:  None immediately appreciated. DISCUSSION:  Milind Christiansen is a 59y.o.-year-old male who requires central IV access vascular access and hypovolemia. The history and physical examination were reviewed and confirmed. CONSENT: The patient provided verbal consent for this procedure. PROCEDURE:  A timeout was initiated by the bedside nurse and was confirmed by those present. The patient was placed in a supine position. The skin overlying the Right Femoral Vein was prepped with chlorhexadine and draped in sterile fashion. The skin was infiltrated with local anesthetic. The vessel and surrounding anatomy was visualized using ultrasound. Through the anesthetized region, the introducer needle was inserted into the femoral vein returning dark red non pulsatile blood. A guidewire was placed through the center of the needle with no resistance. Ultrasound confirmed presence of wire in the vein. A small incision made in the skin with a #11 scalpel blade. The dilator was inserted into the skin and vein over guidewire using Seldinger technique. The dilator was then removed and the 7F 20cm catheter was placed in the vein over the guidewire using Seldinger technique. The guidewire was then removed and all ports aspirated and flushed appropriately. The catheter then secured using silk suture and a temporary sterile dressing was applied. No immediate complication was evident.   All sponge, instrument and needle counts were correct at the completion of the procedure. The patient tolerated the procedure well with no immediate complication evident.      David Rodriguez DO  10:33 PM, 4/16/22

## 2022-04-17 NOTE — H&P
[address]      [address] [facility]     HISTORY AND PHYSICAL EXAMINATION            Date:   4/17/2022  Patient name:  Devin Mcwilliams  Date of admission:  4/16/2022  5:31 PM  MRN:   [id number]  Account:  [de-identified]  YOB: 1960  PCP:    Oz Mulligan MD  Room:   2012/2012-01  Code Status:    Full Code    Chief Complaint:     Chief Complaint   Patient presents with    Cough       History Obtained From:     patient, electronic medical record    History of Present Illness: The patient is a 59 y.o. Non- / non  male who presents withCough   and he is admitted to the hospital for the management of right pleural effusion. Patient reports that he was home for the last week, and had recently caught a cold from his wife, had a brief viral illness with cough that persisted. Patient reports that he then felt ill, and developed cough, which time he had coughing fits last week when he would cough strongly for hours. Patient reports that on Friday he did go to see his outpatient doctor in the office but then presented to the emergency department the following day for extreme chest pain, shortness of breath. He was found to be hypotensive in the emergency department and was bolused 2 L of fluid. Met SIRS criteria 2 out of 4 with tachycardia, white blood cell count, and did have low blood pressure 61/51 on presentation. Initial work-up for sepsis was performed, blood cultures drawn, fluid bolus given, maintenance fluids given, imaging showed large right pleural effusion, right rectus sheath hematoma. Pro-Taz was slightly elevated, however given imaging, pulmonology does not feel strongly about any antibiotics at this time.   Patient is from home and does not have any predisposing factors for Pseudomonas infection    Patient is saturating mid 90s on 2 L nasal cannula, does not use home oxygen. Possible history of obstructive sleep apnea, ported the patient had an home sleep apnea study which was negative. Patient reports today he is doing fine, slightly better than yesterday, however reports he is still coughing quite a bit which causes him a lot of right-sided discomfort    Plan today is possible bedside thoracentesis by pulmonology, IR has been consulted for possible hematoma evacuation. Past Medical History:     Past Medical History:   Diagnosis Date    CAD (coronary artery disease)     Cerebral artery occlusion with cerebral infarction ([facility] [address] 04/2021    TIA    [facility]     Hx of heart artery stent     heart stents x 6    Hyperlipidemia     Hypertension     MI (myocardial infarction) ([facility] [address]     MI at age 35        Past SurgicalHistory:     Past Surgical History:   Procedure Laterality Date    APPENDECTOMY      CARDIAC CATHETERIZATION  09/13/2021    LOARINE MID RCA LORAINE DISTAL RCA    CARDIAC CATHETERIZATION      CARDIAC CATHETERIZATION      heart stents x 6    CARDIAC SURGERY      4 stents placed in 2002    TONSILLECTOMY      as a child        Medications Prior to Admission:        Prior to Admission medications    Medication Sig Start Date End Date Taking?  Authorizing Provider   ketorolac (TORADOL) 10 MG tablet Take 1 tablet by mouth every 6 hours as needed for Pain 4/15/22 4/15/23  Fabienne London MD   docusate sodium (COLACE) 100 MG capsule Take 1 capsule by mouth 2 times daily  Patient not taking: Reported on 4/16/2022 4/15/22 5/15/22  Fabienne London MD   furosemide (LASIX) 20 MG tablet take 1 tablet by mouth once daily 3/31/22   Historical Provider, MD   clopidogrel (PLAVIX) 75 MG tablet take 1 tablet by mouth once daily 3/31/22   Historical Provider, MD   atorvastatin (LIPITOR) 80 MG tablet Take 1 tablet by mouth nightly  Patient not taking: Reported on 4/16/2022 9/14/21   NICKI Diaz - CNP nitroGLYCERIN (NITROSTAT) 0.4 MG SL tablet Place 1 tablet under the tongue every 5 minutes as needed for Chest pain up to max of 3 total doses. If no relief after 1 dose, call 911. Patient not taking: Reported on 4/16/2022 9/14/21   NICKI Merrill CNP   metoprolol tartrate (LOPRESSOR) 25 MG tablet Take 1 tablet by mouth 2 times daily 9/14/21   NICKI Merrill CNP   losartan (COZAAR) 50 MG tablet Take 50 mg by mouth daily    Historical Provider, MD   aspirin 81 MG chewable tablet Take 1 tablet by mouth daily 4/20/21   Ann Davenport MD        Allergies:     Patient has no known allergies. Social History:     Tobacco:    reports that he quit smoking about 35 years ago. His smoking use included cigarettes. He started smoking about 47 years ago. He has a 30.00 pack-year smoking history. He has never used smokeless tobacco.  Alcohol:      reports current alcohol use. Drug Use:  reports no history of drug use. Family History:     Family History   Problem Relation Age of Onset    Stroke Mother     Heart Disease Mother     Heart Disease Father     Diabetes Father     Stroke Father        Review of Systems:     Positive and Negative as described in HPI. Review of Systems   Constitutional: Negative for chills, diaphoresis and fatigue. HENT: Negative. Eyes: Negative. Respiratory: Negative. Negative for apnea, cough, choking, chest tightness, shortness of breath, wheezing and stridor. Cardiovascular: Negative for chest pain, palpitations and leg swelling. Gastrointestinal: Negative. Negative for abdominal distention, abdominal pain, constipation, diarrhea, nausea and vomiting. Endocrine: Negative. Genitourinary: Negative. Negative for decreased urine volume, difficulty urinating, frequency and urgency. Musculoskeletal:             Skin: Negative. Allergic/Immunologic: Negative. Hematological: Negative.         Physical Exam:   BP (!) 124/98   Pulse 106   Temp 98.9 °F (37.2 °C) (Oral)   Resp 18   Ht 6' 1\" (1.854 m)   Wt (!) 300 lb 14.9 oz (136.5 kg)   SpO2 94%   BMI 39.70 kg/m²   Temp (24hrs), Av.3 °F (36.8 °C), Min:97.8 °F (36.6 °C), Max:98.9 °F (37.2 °C)    No results for input(s): POCGLU in the last 72 hours. Intake/Output Summary (Last 24 hours) at 2022 0945  Last data filed at 2022 0601  Gross per 24 hour   Intake 3331.06 ml   Output 1100 ml   Net 2231.06 ml       Physical Exam  Vitals and nursing note reviewed. Constitutional:       General: He is not in acute distress. Appearance: He is obese. He is not ill-appearing, toxic-appearing or diaphoretic. HENT:      Head: Normocephalic and atraumatic. Nose: Nose normal.      Mouth/Throat:      Mouth: Mucous membranes are moist.   Eyes:      Extraocular Movements: Extraocular movements intact. Cardiovascular:      Rate and Rhythm: Regular rhythm. Tachycardia present. Heart sounds: Normal heart sounds. No murmur heard. No friction rub. No gallop. Pulmonary:      Effort: Pulmonary effort is normal. No respiratory distress. Breath sounds: Normal breath sounds. No stridor. No wheezing, rhonchi or rales. Chest:      Chest wall: No tenderness. Abdominal:      General: Bowel sounds are normal. There is no distension. Palpations: Abdomen is soft. There is no mass. Tenderness: There is abdominal tenderness (Right upper abdomen). There is no guarding or rebound. Hernia: No hernia is present. Comments: Obese, protuberant   Musculoskeletal:         General: No swelling or deformity. Normal range of motion. Cervical back: Normal range of motion. Right lower leg: No edema. Left lower leg: No edema. Skin:     General: Skin is warm and dry. Findings: Bruising (Right side abdomen, oblique) present. No lesion or rash. Neurological:      Mental Status: He is alert.    Psychiatric:         Mood and Affect: Mood normal.         Behavior: Behavior normal.         Thought Content:  Thought content normal.         Judgment: Judgment normal.         Investigations:     Laboratory Testing:  Recent Results (from the past 24 hour(s))   CBC with Auto Differential    Collection Time: 04/16/22  5:45 PM   Result Value Ref Range    WBC 21.3 (H) 3.5 - 11.0 k/uL    RBC 3.77 (L) 4.5 - 5.9 m/uL    Hemoglobin 10.7 (L) 13.5 - 17.5 g/dL    Hematocrit 31.0 (L) 41 - 53 %    MCV 82.2 80 - 100 fL    MCH 28.3 26 - 34 pg    MCHC 34.4 31 - 37 g/dL    RDW 13.4 11.5 - 14.9 %    Platelets 377 580 - 318 k/uL    MPV 7.1 6.0 - 12.0 fL    Seg Neutrophils 75 (H) 36 - 66 %    Lymphocytes 19 (L) 24 - 44 %    Monocytes 5 1 - 7 %    Eosinophils % 0 0 - 4 %    Basophils 1 0 - 2 %    Segs Absolute 15.97 (H) 1.3 - 9.1 k/uL    Absolute Lymph # 4.05 1.0 - 4.8 k/uL    Absolute Mono # 1.07 0.1 - 1.3 k/uL    Absolute Eos # 0.00 0.0 - 0.4 k/uL    Basophils Absolute 0.21 (H) 0.0 - 0.2 k/uL    Morphology 1+ TEARDROPS     Morphology ANISOCYTOSIS PRESENT     Morphology MICROCYTOSIS PRESENT    Comprehensive Metabolic Panel w/ Reflex to MG    Collection Time: 04/16/22  5:45 PM   Result Value Ref Range    Glucose 130 (H) 70 - 99 mg/dL    BUN 35 (H) 8 - 23 mg/dL    CREATININE 1.86 (H) 0.70 - 1.20 mg/dL    Calcium 8.8 8.6 - 10.4 mg/dL    Sodium 133 (L) 135 - 144 mmol/L    Potassium 4.6 3.7 - 5.3 mmol/L    Chloride 97 (L) 98 - 107 mmol/L    CO2 22 20 - 31 mmol/L    Anion Gap 14 9 - 17 mmol/L    Alkaline Phosphatase 62 40 - 129 U/L    ALT 17 5 - 41 U/L    AST 11 <40 U/L    Total Bilirubin 0.47 0.3 - 1.2 mg/dL    Total Protein 6.2 (L) 6.4 - 8.3 g/dL    Albumin 3.9 3.5 - 5.2 g/dL    GFR Non- 37 (L) >60 mL/min    GFR  45 (L) >60 mL/min    GFR Comment         Lipase    Collection Time: 04/16/22  5:45 PM   Result Value Ref Range    Lipase 19 13 - 60 U/L   Troponin    Collection Time: 04/16/22  5:45 PM   Result Value Ref Range    Troponin, High Sensitivity 16 0 - 22 ng/L   Brain Natriuretic Peptide    Collection Time: 04/16/22  5:45 PM   Result Value Ref Range    Pro- <300 pg/mL   TYPE AND SCREEN    Collection Time: 04/16/22  5:45 PM   Result Value Ref Range    Expiration Date 04/19/2022,3549     Arm Band Number [id number]     ABO/Rh A POSITIVE     Antibody Screen NEGATIVE     Blood Bank Comment  PATIENTS ABORH CONFIRMED A POS:403     Unit Number [id number]     Product Code Leukocyte Reduced Red Cell     Unit Divison 00     Dispense Status ALLOCATED     Transfusion Status OK TO TRANSFUSE     Crossmatch Result COMPATIBLE     Unit Number [id number]     Product Code Leukocyte Reduced Red Cell     Unit Divison 00     Dispense Status ALLOCATED     Transfusion Status OK TO TRANSFUSE     Crossmatch Result COMPATIBLE     Unit Number [id number]     Product Code Leukocyte Reduced Red Cell     Unit Divison 00     Dispense Status ALLOCATED     Transfusion Status OK TO TRANSFUSE     Crossmatch Result COMPATIBLE     Unit Number [id number]     Product Code Leukocyte Reduced Red Cell     Unit Divison 00     Dispense Status ALLOCATED     Transfusion Status OK TO TRANSFUSE     Crossmatch Result COMPATIBLE    Protime-INR    Collection Time: 04/16/22  5:45 PM   Result Value Ref Range    Protime 13.8 11.8 - 14.6 sec    INR 1.1    APTT    Collection Time: 04/16/22  5:45 PM   Result Value Ref Range    PTT 31.8 24.0 - 36.0 sec   COVID-19 & Influenza Combo    Collection Time: 04/16/22  6:34 PM    Specimen: Nasopharyngeal Swab   Result Value Ref Range    Specimen Description . NASOPHARYNGEAL SWAB     Source . NASOPHARYNGEAL SWAB     SARS-CoV-2 RNA, RT PCR Not Detected Not Detected    INFLUENZA A Not Detected Not Detected    INFLUENZA B Not Detected Not Detected   Culture, Blood 2    Collection Time: 04/16/22  6:35 PM    Specimen: Blood   Result Value Ref Range    Specimen Description . BLOOD  NO VOL GIVEN R HAND     Culture NO GROWTH <24 HRS    Culture, Blood 1    Collection Time: 04/16/22  6:49 PM    Specimen: Blood   Result Value Ref Range    Specimen Description . BLOOD  NO VOL GIVEN L HAND     Culture NO GROWTH <24 HRS    Troponin    Collection Time: 04/16/22  7:10 PM   Result Value Ref Range    Troponin, High Sensitivity 16 0 - 22 ng/L   Lactate, Sepsis    Collection Time: 04/16/22  7:59 PM   Result Value Ref Range    Lactic Acid, Sepsis 2.8 (H) 0.5 - 1.9 mmol/L   Hemoglobin and Hematocrit    Collection Time: 04/16/22  8:56 PM   Result Value Ref Range    Hemoglobin 8.8 (L) 13.5 - 17.5 g/dL    Hematocrit 27.2 (L) 41 - 53 %   Lactate, Sepsis    Collection Time: 04/16/22  9:55 PM   Result Value Ref Range    Lactic Acid, Sepsis 2.4 (H) 0.5 - 1.9 mmol/L   CBC with Auto Differential    Collection Time: 04/17/22  4:21 AM   Result Value Ref Range    WBC 13.5 (H) 3.5 - 11.0 k/uL    RBC 3.00 (L) 4.5 - 5.9 m/uL    Hemoglobin 8.3 (L) 13.5 - 17.5 g/dL    Hematocrit 25.1 (L) 41 - 53 %    MCV 83.7 80 - 100 fL    MCH 27.6 26 - 34 pg    MCHC 33.0 31 - 37 g/dL    RDW 13.6 11.5 - 14.9 %    Platelets 770 960 - 083 k/uL    MPV 7.0 6.0 - 12.0 fL    Seg Neutrophils 75 (H) 36 - 66 %    Lymphocytes 15 (L) 24 - 44 %    Monocytes 10 (H) 1 - 7 %    Eosinophils % 0 0 - 4 %    Basophils 0 0 - 2 %    Segs Absolute 10.10 (H) 1.3 - 9.1 k/uL    Absolute Lymph # 2.00 1.0 - 4.8 k/uL    Absolute Mono # 1.40 (H) 0.1 - 1.3 k/uL    Absolute Eos # 0.00 0.0 - 0.4 k/uL    Basophils Absolute 0.00 0.0 - 0.2 k/uL   Basic Metabolic Panel    Collection Time: 04/17/22  4:21 AM   Result Value Ref Range    Glucose 139 (H) 70 - 99 mg/dL    BUN 34 (H) 8 - 23 mg/dL    CREATININE 1.27 (H) 0.70 - 1.20 mg/dL    Calcium 7.7 (L) 8.6 - 10.4 mg/dL    Sodium 135 135 - 144 mmol/L    Potassium 4.0 3.7 - 5.3 mmol/L    Chloride 101 98 - 107 mmol/L    CO2 24 20 - 31 mmol/L    Anion Gap 10 9 - 17 mmol/L    GFR Non-African American 57 (L) >60 mL/min    GFR African American >60 >60 mL/min    GFR Comment         Protime-INR    Collection Time: 04/17/22  4:21 AM   Result Value Ref Range Protime 15.2 (H) 11.8 - 14.6 sec    INR 1.2    Procalcitonin    Collection Time: 04/17/22  4:21 AM   Result Value Ref Range    Procalcitonin 0.11 (H) <0.09 ng/mL       Imaging/Diagnostics:  XR CHEST PORTABLE    Result Date: 4/16/2022  EXAMINATION: ONE XRAY VIEW OF THE CHEST 4/16/2022 2:49 pm COMPARISON: March 14 2022 HISTORY: ORDERING SYSTEM PROVIDED HISTORY: hypotension, SOB TECHNOLOGIST PROVIDED HISTORY: hypotension, SOB Reason for Exam: hypotension, SOB FINDINGS: Marginal inspiration is noted. A large right pleural effusion is noted, left basilar opacification, likely due to a combination of effusion, atelectasis and or consolidation. Cardiomegaly is noted. Vascular markings are distinct. No pneumothorax noted. No acute osseous abnormalities present. 1. Large right pleural effusion 2. Right basilar opacification, which may be due to a combination of atelectasis, effusion and or consolidation. CTA CHEST ABDOMEN PELVIS W CONTRAST    Result Date: 4/16/2022  EXAMINATION: CTA OF THE CHEST, ABDOMEN AND PELVIS WITH CONTRAST 4/16/2022 3:30 pm: TECHNIQUE: CTA of the chest, abdomen and pelvis was performed after the administration of intravenous contrast.  Multiplanar reformatted images are provided for review. MIP images are provided for review. Dose modulation, iterative reconstruction, and/or weight based adjustment of the mA/kV was utilized to reduce the radiation dose to as low as reasonably achievable.  COMPARISON: Chest x-ray dated April 16, 2022 HISTORY: ORDERING SYSTEM PROVIDED HISTORY: hypotension, Chest pain, r/o disection TECHNOLOGIST PROVIDED HISTORY: hypotension, Chest pain, r/o disection Decision Support Exception - unselect if not a suspected or confirmed emergency medical condition->Emergency Medical Condition (MA) Reason for Exam: hypotension, Chest pain, r/o disection Relevant Medical/Surgical History: chf, stents, cardiac cath, appendectomy FINDINGS: CTA CHEST: Thoracic aorta: No evidence of thoracic aortic aneurysm or dissection. No acute abnormality of the aorta. Mediastinum: No adenopathy is present. Normal enhancement of the pulmonary arterial system is present. Extensive coronary arterial calcifications are noted. Small pericardial effusion is present. Fatty changes are present involving the apex of the left ventricle, related to prior infarct. Lungs/Pleura: A large right pleural effusion is present, with compressive atelectasis involving the right lower lobe. Superimposed infection is difficult to exclude. The trachea and mainstem bronchi appear normal. Paraseptal emphysema is present within the right apex. Minimal patchy airspace disease is present within the left upper lobe, likely infectious in etiology. Soft Tissues/Bones: No acute osseous abnormality is present involving the thorax. Asymmetric enlargement of the right serratus anterior muscle is present, compared to the left. This may represent intramuscular hematoma or inflammation/myositis. CTA ABDOMEN: Abdominal aorta/Branches: Normal enhancement of the abdominal aorta and branch vessels is present without evidence of aneurysm formation or dissection. Organs: Hepatic steatosis is present. The gallbladder, pancreas, spleen, adrenal glands, and kidneys demonstrate no acute abnormality. GI/Bowel: Stomach appears normal.  Small bowel appears normal without evidence of obstruction. No evidence of appendicitis is present. Scattered colonic diverticula, without evidence of diverticulitis. Peritoneum/Retroperitoneum: No adenopathy is present. No free fluid is present within the abdomen. No free air is noted. Bones/Soft Tissues: No acute osseous abnormality is present involving the abdomen. Marked enlargement of the right rectus sheath and rectus abdominus muscle is present, consistent with a large rectus sheath hematoma.   Hematoma extends superiorly, along the anterolateral right chest wall, likely involving the serrated anterior muscle is well. Right rectus sheath hematoma measures approximately 15-16 cm in transverse dimension, 4-5 cm in AP dimension, and approximately 16 cm in cephalocaudal dimension. No evidence of active bleeding is present. Soft tissue stranding is present throughout the subcutaneous fat of the right flank and anterior abdominal wall. CTA PELVIS: Aorta/Iliacs: Normal enhancement of the aortic bifurcation, common iliac, internal and external iliac arteries common femoral arteries is noted bilaterally without evidence of aneurysm formation or dissection. Other: Urinary bladder is nondistended. Prostate gland is normal size. No free fluid or free air is present within the pelvis. Bones/Soft Tissues: No acute osseous abnormality is present involving the pelvis. A small umbilical hernia is present containing fat. 1. No evidence of dissection, aneurysm formation, or intramural hematoma formation involving the thoracic or abdominal aorta 2. Large right pleural effusion, with atelectasis involving the right lower lobe. Superimposed infection is difficult to exclude 3. Large right rectus sheath hematoma, measuring approximately 15-16 cm in transverse dimension, 4-5 cm in AP dimension, and approximately 16 cm in cephalocaudal dimension 4. Asymmetric enlargement of the right serratus anterior muscle alyce may related to intramuscular hematoma formation, or myositis. 5.  Critical results were called by Dr. Jillian Eduardo to Dr. Nelsy Jimenes on 4/16/2022 at 7:11 p.m. hours. Assessment :      Primary Problem  Pleural effusion    Active Hospital Problems    Diagnosis Date Noted    ISA (acute kidney injury) ([facility] [address] [N17.9] 04/17/2022    Primary hypertension [I10] 04/17/2022    Pleural effusion [J90] 04/16/2022       Plan:     Patient status Admit as inpatient in the  Medical ICU intermediate.     Septic shock with right pleural effusion secondary to severe cough, likely due to viral pneumonia versus recent change to losartan from lisinopril -resolved  -Initial blood pressure 61/51, responded to 2 L fluid bolus; initial lactic acid 2.8, trended to 2.4; WBCs 21.3, trended to 13.5; temperature on admission 97.8; respiratory rate on admission 18, heart rate on admission 118  -Per pulmonology, bedside thoracentesis planned for 4/17/2022  -Antitussives  -Procalcitonin 0.11  -Cefepime, vancomycin started; per pulmonology, cefepime not recommended due to no recent hospitalization and no concern for Pseudomonas coverage  -Blood cultures x2 no growth to date  -MRSA nasal swab pending  -Influenza A, B, not detected  -SARS-CoV-2 not detected  -Urinalysis and urine culture obtained, pending  -proBNP 253  -Saturating in the mid 90s on 2 L nasal cannula    Right rectus sheath hematoma  -Continue to monitor, possibly IR guided aspiration  -IR consulted  -Antitussives  -Fentanyl 50 mcg IV every 2 hours as needed for pain    Acute kidney injury  -Baseline 0.8  -On admission 1.86, improved to 1.27 on 4/17/2022  -Continue resuscitation, decrease from 100 mL an hour to 75 mL an hour normal saline    Hypotension on admission, resolved  -Fluid bolused 2 L normal saline  -Normal saline 100 mL an hour, recommended to decrease rate per pulmonology, decreased to 75  -Antihypertensives held, blood pressure holding    Anemia, unknown etiology, however recent drop in hemoglobin likely dilutional due to high volume resuscitation for hypotension  -Hemoglobin 10.7 on admission  -Hemoglobin 8.3 on 4/17/2022  -Iron studies ordered  -Transfuse if hemoglobin less than 7    Possible DELLA  -Pulmonology recommends outpatient sleep lab study.     History of hypertension  -Hold home Lopressor, Cozaar, Lasix due to hypotension on admission  -Blood pressure stable, consider radiating as needed    History of multiple cardiac stents  -On Plavix at home, hold for bedside thoracentesis and possible hematoma evacuation per IR    Diet  -N.p.o. except ice chips, sips of water with meds for bedside thoracentesis  -Once procedures completed, advance diet as tolerated    DVT prophylaxis  -Pneumatic compression devices  -Hold anticoagulation due to bedside thoracentesis, possible IR guided hematoma evacuation, hematoma and anemia    GI prophylaxis  -Protonix    Sellers catheter in place, draining concentrated, dark yellow clear urine. DC today. PT OT consulted    Consultations:   PHARMACY TO DOSE VANCOMYCIN  IP CONSULT TO INTERNAL MEDICINE  IP CONSULT TO CRITICAL CARE  IP CONSULT TO INTERVENTIONAL RADIOLOGY  IP CONSULT TO GENERAL SURGERY    Patient is admitted as inpatient status because of co-morbidities listed above, severity of signs and symptoms as outlined, requirement for current medical therapies and most importantly because of direct risk to patient if care not provided in a hospital setting. Coni Flores MD  4/17/2022  9:45 AM    Copy sent to Dr. Brooke Josue MD      Attending Physician Statement  I have discussed the care of Michelle1 S Georgi Mcrae with the resident team. I have examined the patient myself and taken ros and hpi , including pertinent history and exam findings,  with the resident. I have reviewed the key elements of all parts of the encounter with the resident. I agree with the assessment, plan and orders as documented by the resident. Principal Problem:    Pleural effusion  Active Problems:    ISA (acute kidney injury) ([facility] [address]    Primary hypertension  Resolved Problems:    * No resolved hospital problems. *    Severe Sepsis, source not identified yet, resp panel awaited  right sided pleural effsion for thoracentesis tomorrow   startd On cefepime and vanc, pancultured- vanc to be stopped as MRSA swab neg. Will observe off antibiotics for now.    Rectal sheath hematoma on the right- for IR drainage   ISA secondary to ischemic ATN- hydration     Acute blood loss anemia- hemoglobin 15.6 March 2022, 8.0 today, likely from blood loss into the hematoma but will check iron studies and stool Hemoccult as well    Electronically signed by Camilo Barrera MD

## 2022-04-17 NOTE — PROGRESS NOTES
Pt admitted to 2012 cardiac monitor, nibp and sao2 applied. Pt oriented to surroundings. Vs obtained. Routine of the unit and call light explained.  Belongings in closet

## 2022-04-17 NOTE — PROGRESS NOTES
Vancomycin Dosing by Pharmacy - Daily Note   Vancomycin Therapy Day:  2  Indication: sepsis, CAP    Allergies:  Patient has no known allergies. Actual Weight:    Wt Readings from Last 1 Encounters:   04/16/22 (!) 300 lb 14.9 oz (136.5 kg)       Labs/Ancillary Data  Estimated Creatinine Clearance: 86 mL/min (A) (based on SCr of 1.27 mg/dL (H)). Recent Labs     04/16/22  1745 04/17/22  0421   CREATININE 1.86* 1.27*   BUN 35* 34*   WBC 21.3* 13.5*     Procalcitonin   Date Value Ref Range Status   04/17/2022 0.11 (H) <0.09 ng/mL Final     Comment:           Suspected Sepsis:  <0.50 ng/mL     Low likelihood of sepsis. 0.50-2.00 ng/mL     Increased likelihood of sepsis. Antibiotics encouraged. >2.00 ng/mL     High risk of sepsis/shock. Antibiotics strongly encouraged. Suspected Lower Resp Tract Infections:  <0.24 ng/mL     Low likelihood of bacterial infection. >0.24 ng/mL     Increased likelihood of bacterial infection. Antibiotics encouraged. With successful antibiotic therapy, PCT levels should decrease rapidly. (Half-life of 24 to   36 hours.)        Procalcitonin values from samples collected within the first 6 hours of systemic infection   may still be low. Retesting may be indicated. Values from day 1 and day 4 can be entered into the Change in Procalcitonin Calculator   (www.Peb[employer]-pct-calculator. [employer]) to determine the patient's Mortality Risk Prognosis        In healthy neonates, plasma Procalcitonin (PCT) concentrations increase gradually after   birth, reaching peak values at about 24 hours of age then decrease to normal values below   0.5 ng/mL by 48-72 hours of age.          Intake/Output Summary (Last 24 hours) at 4/17/2022 0748  Last data filed at 4/17/2022 0601  Gross per 24 hour   Intake 3331.06 ml   Output 1100 ml   Net 2231.06 ml     Temp: 98.2    Culture Date / Source  /  Results  See   Recent vancomycin administrations                     vancomycin (VANCOCIN) intermittent dosing (placeholder) ()  Given 04/17/22 0201    vancomycin (VANCOCIN) 2,500 mg in dextrose 5 % 500 mL IVPB (mg) 2,500 mg New Bag 04/16/22 1956                    Vancomycin Concentrations:   TROUGH:  No results for input(s): VANCOTROUGH in the last 72 hours. RANDOM:  No results for input(s): VANCORANDOM in the last 72 hours. MRSA Nasal Swab: was ordered by provider, awaiting results. Isela Ahmadi PLAN     Increase dose to 1500 mg q18h IV  Ensured BUN/sCr ordered at baseline and every 48 hours x at least 3 levels, then at least weekly. Repeat vancomycin concentration ordered for 4/18 @ 0600  Pharmacy will continue to monitor patient and adjust therapy as indicated      Vancomycin Target Concentration Parameters  Treatment  Population Target AUC/ALLYSSA Target Trough   Invasive MRSA Infection (bacteremia, pneumonia, meningitis, endocarditis, osteomyelitis)  Sepsis (undifferentiated) 400-600 N/A   Infection due to non-MRSA pathogen  Empiric treatment of non-invasive MRSA infection  (SSTI, UTI) <500 10-15 mg/L   CrCl < 29 mL/min  Rapidly fluctuating serum creatinine   ISA N/A < 15 mg/L     Renal replacement therapy is dosed by levels, per hospital protocol. Abbreviations  * Pauc: probability that AUC is >400 (efficacy); Pconc: probability that Ctrough is above 20 ?g/mL (toxicity); Tox: Probability of nephrotoxicity, based on Lodise et al. Clin Infect Dis 2009. Loading dose: 2500 mg at 20:00 04/16/2022. Regimen: 1500 mg IV every 18 hours. Start time: 07:56 on 04/17/2022  Exposure target: Ctrough10-15 mg/L   AUC24,ss: 457 mg/L.hr  Probability of AUC24 > 400: 65 %  Ctrough,ss: 14.4 mg/L  Probability of Ctrough,ss > 20: 22 %  Probability of nephrotoxicity (Lodise CJ 2009): 10 %      Thank you for the consult. Pharmacy will continue to follow.    Félix Hubbard RPh  4/17/2022  7:48 AM

## 2022-04-17 NOTE — ED NOTES
Phone call placed to ICU for report. Await RN arrival to department.       Thu Shell RN  04/16/22 [id number]

## 2022-04-17 NOTE — ED NOTES
At bedside with Dr. Mendoza for central line insertion as requested by consulted Surgery physician.      Destiny Palafox RN  04/16/22 [id number]

## 2022-04-17 NOTE — CONSULTS
General Surgery Consult      Pt Name: Lissette Vazquez  MRN: [id number]  YOB: 1960  Date of evaluation: 4/17/2022  Primary Care Physician: Hardy Bagley MD   Patient evaluated at the request of  Dr. Lilli Fan  Reason for evaluation: Abdominal pain    SUBJECTIVE:   History of Chief Complaint:    Lissette Vazquez is a 59 y.o. male who presents with abdominal pain. Bruising on the right flank. Patient is on Plavix and aspirin. History of coronary artery disease. Sudden onset. Feels better this morning compared to yesterday. No urinary symptoms. No other acute bowel issues. CT scan protocol reviewed. Symptom onset has been acute for a time period of few day(s). Severity is described as moderate to severe. Course of his symptoms over time is acute. Past Medical History   has a past medical history of CAD (coronary artery disease), Cerebral artery occlusion with cerebral infarction ([facility] [address] CHF (congestive heart failure) ([facility] [address] Hx of heart artery stent, Hyperlipidemia, Hypertension, and MI (myocardial infarction) ([facility] [address] Past Surgical History   has a past surgical history that includes Appendectomy; Tonsillectomy; Cardiac surgery; Cardiac catheterization (09/13/2021); Cardiac catheterization; and Cardiac catheterization. Medications  Prior to Admission medications    Medication Sig Start Date End Date Taking?  Authorizing Provider   ketorolac (TORADOL) 10 MG tablet Take 1 tablet by mouth every 6 hours as needed for Pain 4/15/22 4/15/23  Hardy Bagley MD   docusate sodium (COLACE) 100 MG capsule Take 1 capsule by mouth 2 times daily  Patient not taking: Reported on 4/16/2022 4/15/22 5/15/22  Hardy Bagley MD   furosemide (LASIX) 20 MG tablet take 1 tablet by mouth once daily 3/31/22   Historical Provider, MD   clopidogrel (PLAVIX) 75 MG tablet take 1 tablet by mouth once daily 3/31/22   Historical Provider, MD   atorvastatin (LIPITOR) 80 MG tablet Take 1 tablet by mouth nightly  Patient not taking: Reported on 4/16/2022 9/14/21   Dixon Leonardo, APRN - CNP   nitroGLYCERIN (NITROSTAT) 0.4 MG SL tablet Place 1 tablet under the tongue every 5 minutes as needed for Chest pain up to max of 3 total doses. If no relief after 1 dose, call 911. Patient not taking: Reported on 4/16/2022 9/14/21   Peggi Dye, APRN - CNP   metoprolol tartrate (LOPRESSOR) 25 MG tablet Take 1 tablet by mouth 2 times daily 9/14/21   Peggi Dye, APRN - CNP   losartan (COZAAR) 50 MG tablet Take 50 mg by mouth daily    Historical Provider, MD   aspirin 81 MG chewable tablet Take 1 tablet by mouth daily 4/20/21   Leeanne Booth MD     Allergies  has No Known Allergies. Family History  family history includes Diabetes in his father; Heart Disease in his father and mother; Stroke in his father and mother. Social History   reports that he quit smoking about 35 years ago. His smoking use included cigarettes. He started smoking about 47 years ago. He has a 30.00 pack-year smoking history. He has never used smokeless tobacco. He reports current alcohol use. He reports that he does not use drugs. Review of Systems:  All 10 system review was conducted. Please refer to chart. OBJECTIVE:   VITALS:  height is 6' 1\" (1.854 m) and weight is 300 lb 14.9 oz (136.5 kg) (abnormal). His oral temperature is 98.7 °F (37.1 °C). His blood pressure is 150/101 (abnormal) and his pulse is 111. His respiration is 19 and oxygen saturation is 95%. CONSTITUTIONAL: Alert and oriented times 3, no acute distress and cooperative to examination with proper mood and affect. SKIN: Skin color, texture, turgor normal. No rashes or lesions. LYMPH: no cervical nodes, no inguinal nodes  HEENT: Head is normocephalic, atraumatic.  EOMI, PERRLA  NECK: Supple, symmetrical, trachea midline, no adenopathy, thyroid symmetric, not enlarged and no tenderness, skin normal  CHEST/LUNGS: chest symmetric with normal A/P diameter, normal respiratory rate and rhythm, lungs clear to auscultation without wheezes, rales or rhonchi. No accessory muscle use. Scars None   CARDIOVASCULAR: Heart regular rate and rhythm Normal S1 and S2. . Carotid and femoral pulses 2+/4 and equal bilaterally  ABDOMEN: Soft abdomen nondistended obese. Some bruising on the right flank. Tender on the right side. No peritoneal signs. RECTAL: deferred, not clinically indicated  NEUROLOGIC: There are no focalizing motor or sensory deficits. CN II-XII are grossly intact.   EXTREMITIES: no cyanosis, no clubbing and no edema    LABS:   CBC with Differential:    Lab Results   Component Value Date    WBC 13.5 04/17/2022    RBC 3.00 04/17/2022    HGB 8.0 04/17/2022    HCT 23.3 04/17/2022     04/17/2022    MCV 83.7 04/17/2022    MCH 27.6 04/17/2022    MCHC 33.0 04/17/2022    RDW 13.6 04/17/2022    LYMPHOPCT 15 04/17/2022    MONOPCT 10 04/17/2022    BASOPCT 0 04/17/2022    MONOSABS 1.40 04/17/2022    LYMPHSABS 2.00 04/17/2022    EOSABS 0.00 04/17/2022    BASOSABS 0.00 04/17/2022    DIFFTYPE NOT REPORTED 04/19/2021     BMP:    Lab Results   Component Value Date     04/17/2022    K 4.0 04/17/2022     04/17/2022    CO2 24 04/17/2022    BUN 34 04/17/2022    LABALBU 3.9 04/16/2022    CREATININE 1.27 04/17/2022    CALCIUM 7.7 04/17/2022    GFRAA >60 04/17/2022    LABGLOM 57 04/17/2022    GLUCOSE 139 04/17/2022     Hepatic Function Panel:    Lab Results   Component Value Date    ALKPHOS 62 04/16/2022    ALT 17 04/16/2022    AST 11 04/16/2022    PROT 6.2 04/16/2022    BILITOT 0.47 04/16/2022    LABALBU 3.9 04/16/2022     Calcium:    Lab Results   Component Value Date    CALCIUM 7.7 04/17/2022     Magnesium:  No results found for: MG  Phosphorus:  No results found for: PHOS  PT/INR:    Lab Results   Component Value Date    PROTIME 15.2 04/17/2022    INR 1.2 04/17/2022     ABG:  No results found for: PHART, PH, [id number], PCO2, PO2ART, PO2, UVC9NHR, HCO3, BEART, BE, THGBART, THB, QHL3WQX, X7NKLYAD, O2SAT  Urine Culture:  No components found for: CURINE  Blood Culture:  No components found for: CBLOOD, CFUNGUSBL  Stool Culture:  No components found for: CSTOOL    RADIOLOGY:   I have personally reviewed the following films:  XR CHEST PORTABLE    Result Date: 4/16/2022  EXAMINATION: ONE XRAY VIEW OF THE CHEST 4/16/2022 2:49 pm COMPARISON: March 14 2022 HISTORY: ORDERING SYSTEM PROVIDED HISTORY: hypotension, SOB TECHNOLOGIST PROVIDED HISTORY: hypotension, SOB Reason for Exam: hypotension, SOB FINDINGS: Marginal inspiration is noted. A large right pleural effusion is noted, left basilar opacification, likely due to a combination of effusion, atelectasis and or consolidation. Cardiomegaly is noted. Vascular markings are distinct. No pneumothorax noted. No acute osseous abnormalities present. 1. Large right pleural effusion 2. Right basilar opacification, which may be due to a combination of atelectasis, effusion and or consolidation. CTA CHEST ABDOMEN PELVIS W CONTRAST    Result Date: 4/16/2022  EXAMINATION: CTA OF THE CHEST, ABDOMEN AND PELVIS WITH CONTRAST 4/16/2022 3:30 pm: TECHNIQUE: CTA of the chest, abdomen and pelvis was performed after the administration of intravenous contrast.  Multiplanar reformatted images are provided for review. MIP images are provided for review. Dose modulation, iterative reconstruction, and/or weight based adjustment of the mA/kV was utilized to reduce the radiation dose to as low as reasonably achievable.  COMPARISON: Chest x-ray dated April 16, 2022 HISTORY: ORDERING SYSTEM PROVIDED HISTORY: hypotension, Chest pain, r/o disection TECHNOLOGIST PROVIDED HISTORY: hypotension, Chest pain, r/o disection Decision Support Exception - unselect if not a suspected or confirmed emergency medical condition->Emergency Medical Condition (MA) Reason for Exam: hypotension, Chest pain, r/o disection Relevant Medical/Surgical History: chf, stents, cardiac cath, appendectomy FINDINGS: CTA CHEST: Thoracic aorta: No evidence of thoracic aortic aneurysm or dissection. No acute abnormality of the aorta. Mediastinum: No adenopathy is present. Normal enhancement of the pulmonary arterial system is present. Extensive coronary arterial calcifications are noted. Small pericardial effusion is present. Fatty changes are present involving the apex of the left ventricle, related to prior infarct. Lungs/Pleura: A large right pleural effusion is present, with compressive atelectasis involving the right lower lobe. Superimposed infection is difficult to exclude. The trachea and mainstem bronchi appear normal. Paraseptal emphysema is present within the right apex. Minimal patchy airspace disease is present within the left upper lobe, likely infectious in etiology. Soft Tissues/Bones: No acute osseous abnormality is present involving the thorax. Asymmetric enlargement of the right serratus anterior muscle is present, compared to the left. This may represent intramuscular hematoma or inflammation/myositis. CTA ABDOMEN: Abdominal aorta/Branches: Normal enhancement of the abdominal aorta and branch vessels is present without evidence of aneurysm formation or dissection. Organs: Hepatic steatosis is present. The gallbladder, pancreas, spleen, adrenal glands, and kidneys demonstrate no acute abnormality. GI/Bowel: Stomach appears normal.  Small bowel appears normal without evidence of obstruction. No evidence of appendicitis is present. Scattered colonic diverticula, without evidence of diverticulitis. Peritoneum/Retroperitoneum: No adenopathy is present. No free fluid is present within the abdomen. No free air is noted. Bones/Soft Tissues: No acute osseous abnormality is present involving the abdomen. Marked enlargement of the right rectus sheath and rectus abdominus muscle is present, consistent with a large rectus sheath hematoma.   Hematoma extends superiorly, along the anterolateral right chest file.    PLAN:   Full liquid diet. Continue to monitor hemoglobin. Continue to hold blood thinners. Conservative management. Discussed with patient and family at length. Cardiology evaluation. We will have to hold her blood thinners for several weeks. Family aware. Thank you for this interesting consult and for allowing us to participate in the care of this patient. If you have any questions please don't hesitate to call.           Electronically signed by Pili Sands MD  on 4/17/2022 at 12:18 PM

## 2022-04-17 NOTE — CONSULTS
[facility]   CONSULT NOTE:      DATE OF CONSULT 4/17/2022    REASON FOR CONSULTATION:  Pulmonary/critical care man      PCP Millie Wasserman MD     CHIEF COMPLAINT: Cough    HISTORY OF PRESENT ILLNESS:   William Wheeler is a pleasant morbidly obese 24-year-old white male with no documented pulmonary history who over the last 2 weeks has had a cough which for the most part has been nonproductive in nature. He has seen his primary care doctor for the cough. He is a non-smoker. He was on lisinopril but that was switched over to losartan. Subsequently, he said this past Monday he had \"a coughing spell\" that was so severe it caused him to have abdominal pain. Both his cough and abdominal pain progressed and he came into the emergency room for evaluation. When he went to the emergency room, he was hypotensive and was thought to be septic. IV fluids were initiated. A CT scan of the chest abdomen and pelvis revealed a moderate to large right pleural effusion with compressive atelectasis and also rectus sheath hematoma measuring 15 to 16 cm in transverse diameter, and 4 to 5 cm in AP diameter. The patient was given cefepime and vancomycin in the emergency room, and started on IV fluids. However his blood pressure still remained low and a central line had to be inserted. Fortunately, he is not been on any pressors. He has been taking Toradol for pain and also has been on Plavix because he says he has 6 stents in place. He sees Dr. Malorie benedict from cardiology. He has signs and symptoms of obstructive sleep apnea but he said \"a home sleep study did not show anything\". However, I do not have any evidence of any sleep study being done in our EMR. His lisinopril was changed to losartan about 3 to 4 weeks ago, before he had any cough.     ALLERGIES:  No Known Allergies    HOME MEDICATIONS:  Medications Prior to Admission: ketorolac (TORADOL) 10 MG tablet, Take 1 tablet by mouth every 6 hours as needed for Pain  docusate sodium (COLACE) 100 MG capsule, Take 1 capsule by mouth 2 times daily (Patient not taking: Reported on 4/16/2022)  furosemide (LASIX) 20 MG tablet, take 1 tablet by mouth once daily  clopidogrel (PLAVIX) 75 MG tablet, take 1 tablet by mouth once daily  atorvastatin (LIPITOR) 80 MG tablet, Take 1 tablet by mouth nightly (Patient not taking: Reported on 4/16/2022)  nitroGLYCERIN (NITROSTAT) 0.4 MG SL tablet, Place 1 tablet under the tongue every 5 minutes as needed for Chest pain up to max of 3 total doses. If no relief after 1 dose, call 911.  (Patient not taking: Reported on 4/16/2022)  metoprolol tartrate (LOPRESSOR) 25 MG tablet, Take 1 tablet by mouth 2 times daily  losartan (COZAAR) 50 MG tablet, Take 50 mg by mouth daily  aspirin 81 MG chewable tablet, Take 1 tablet by mouth daily      PAST MEDICAL HISTORY:  Past Medical History:   Diagnosis Date    CAD (coronary artery disease)     Cerebral artery occlusion with cerebral infarction ([facility] [address] 04/2021    TIA    CHF (congestive heart failure) ([facility] [address] [facility]     Hx of heart artery stent     heart stents x 6    Hyperlipidemia     Hypertension     MI (myocardial infarction) ([facility] [address]     MI at age 35       PAST SURGICAL HISTORY:  Past Surgical History:   Procedure Laterality Date    APPENDECTOMY      CARDIAC CATHETERIZATION  09/13/2021    LORAINE MID RCA LORAINE DISTAL RCA    CARDIAC CATHETERIZATION      CARDIAC CATHETERIZATION      heart stents x 6    CARDIAC SURGERY      4 stents placed in 2002    TONSILLECTOMY      as a child          SOCIAL HISTORY:  Social History     Socioeconomic History    Marital status:      Spouse name: Not on file    Number of children: Not on file    Years of education: Not on file    Highest education level: Not on file   Occupational History    Not on file   Tobacco Use    Smoking status: Former Smoker     Packs/day: 2.00     Years: 15.00     Pack years: 30.00     Types: Cigarettes Start date: 63     Quit date: 24     Years since quittin.3    Smokeless tobacco: Never Used   Vaping Use    Vaping Use: Never used   Substance and Sexual Activity    Alcohol use: Yes     Comment: OCCAISIONALLY    Drug use: Never    Sexual activity: Yes     Partners: Female   Other Topics Concern    Not on file   Social History Narrative    Not on file     Social Determinants of Health     Financial Resource Strain: Low Risk     Difficulty of Paying Living Expenses: Not hard at all   Food Insecurity: No Food Insecurity    Worried About [address] in the Last Year: Never true    [facility] in the Last Year: Never true   Transportation Needs:     Lack of Transportation (Medical): Not on file    Lack of Transportation (Non-Medical): Not on file   Physical Activity:     Days of Exercise per Week: Not on file    Minutes of Exercise per Session: Not on file   Stress:     Feeling of Stress : Not on file   Social Connections:     Frequency of Communication with Friends and Family: Not on file    Frequency of Social Gatherings with Friends and Family: Not on file    Attends Restorationist Services: Not on file    Active Member of [address] [place] [address] or Organizations: Not on file    Attends Club or Organization Meetings: Not on file    Marital Status: Not on file   Intimate Partner Violence:     Fear of Current or Ex-Partner: Not on file    Emotionally Abused: Not on file    Physically Abused: Not on file    Sexually Abused: Not on file   Housing Stability:     Unable to Pay for Housing in the Last Year: Not on file    Number of [place] in the Last Year: Not on file    Unstable Housing in the Last Year: Not on file       FAMILY HISTORY:  Family History   Problem Relation Age of Onset    Stroke Mother     Heart Disease Mother     Heart Disease Father     Diabetes Father     Stroke Father        REVIEW OF SYSTEMS:  All other systems reviewed and are negative.       PHYSICAL EXAM:  Vital Signs Blood pressure 124/68, pulse 114, temperature 98.2 °F (36.8 °C), temperature source Oral, resp. rate 21, height 6' 1\" (1.854 m), weight (!) 300 lb 14.9 oz (136.5 kg), SpO2 95 %. Oxygen Amount and Delivery: O2 Flow Rate (L/min): 2 L/min    Admission Weight Weight: (!) 300 lb 14.9 oz (136.5 kg)    General Appearance   Morbidly obese male in no acute respiratory stress  Head  Normocephalic, without obvious abnormality, atraumatic    Eyes  conjunctivae/corneas clear. PERRL, EOM's intact. Fundi benign. ENT macroglossia, crowded posterior pharynx  Neck  no adenopathy, no carotid bruit, no JVD, supple, symmetrical, trachea midline and thyroid not enlarged, symmetric, no tenderness/mass/nodules  Lungs diminished bilaterally  Heart: regular rate and rhythm, S1, S2 normal, no murmur, click, rub or gallop  Abdomen distended, some bruising seen midepigastric and also right flank; bowel sounds normal; no masses,  no organomegaly  Extremities  1+ lower extremity edema  Skin  Skin color, texture, turgor normal. No rashes or lesions; bruises as noted above  Neurologic: Alert and oriented X 3, normal strength and tone.          Imaging  CT of chest shows moderate/large free-flowing right pleural effusion with compressive atelectasis    View of abdominal CT with rectus sheath hematoma on the right      Lab Review  CBC     Lab Results   Component Value Date    WBC 13.5 04/17/2022    RBC 3.00 04/17/2022    HGB 8.3 04/17/2022    HCT 25.1 04/17/2022     04/17/2022    MCV 83.7 04/17/2022    MCH 27.6 04/17/2022    MCHC 33.0 04/17/2022    RDW 13.6 04/17/2022    LYMPHOPCT 15 04/17/2022    MONOPCT 10 04/17/2022    BASOPCT 0 04/17/2022    MONOSABS 1.40 04/17/2022    LYMPHSABS 2.00 04/17/2022    EOSABS 0.00 04/17/2022    BASOSABS 0.00 04/17/2022    DIFFTYPE NOT REPORTED 04/19/2021       BMP   Lab Results   Component Value Date     04/17/2022    K 4.0 04/17/2022     04/17/2022    CO2 24 04/17/2022    BUN 34 04/17/2022 CREATININE 1.27 04/17/2022    GLUCOSE 139 04/17/2022    CALCIUM 7.7 04/17/2022       LFTS  Lab Results   Component Value Date    ALKPHOS 62 04/16/2022    ALT 17 04/16/2022    AST 11 04/16/2022    PROT 6.2 04/16/2022    BILITOT 0.47 04/16/2022    LABALBU 3.9 04/16/2022       INR  Recent Labs     04/16/22  1745 04/17/22  0421   PROTIME 13.8 15.2*   INR 1.1 1.2       APTT  Recent Labs     04/16/22 1745   APTT 31.8       Lactic Acid  No results found for: LACTA     PRO-BNP   Recent Labs     04/16/22  1745   PROBNP 253           ABGs: No results found for: PHART, PO2ART, [id number]    Lab Results   Component Value Date    MODE NOT REPORTED 04/19/2021         Impression    Severe cough-most likely viral no obvious infiltrate seen on chest x-ray CT scan  Right pleural effusion  Right rectus sheath hematoma  Hypotension-resolved  Acute kidney injury-improved  Coronary disease status post 6 stents  History of hypertension  Morbid obesity  Probable DELLA  Non-smoker  Full code        Plan:      Placed on antitussives, Delsym cough syrup, Tessalon Perles, and Hycodan cough syrup  Check procalcitonin level, at this point, I do not feel strongly about any antibiotics.   He is from home and certainly does not need cefepime as he has no underlying lung history and do not suspect Pseudomonas  We will plan on thoracentesis tomorrow at bedside, not urgent today patient on 2 L and not dyspneic  Would limit movement currently  Continue to monitor rectus sheath hematoma-unclear to me if IR drainage is needed  Monitor H&H, transfuse if hemoglobin 7 or less given underlying cardiac history  Check echocardiogram  Decrease IV fluids  Continue intermediate ICU monitoring  Outpatient sleep study (would do in lab given his history)  Outpatient pulmonary function testing  Hold Plavix  Losartan can also cause a cough although incidence is much less than lisinopril  proBNP yesterday was not elevated so I do not expect CHF as a cause of his right pleural effusion

## 2022-04-17 NOTE — ED NOTES
Medicated for pain, wife at bedside. Await bed assignment. VSS.      Bishop Quintero RN  04/16/22 2055

## 2022-04-18 ENCOUNTER — APPOINTMENT (OUTPATIENT)
Dept: NON INVASIVE DIAGNOSTICS | Age: 62
DRG: 871 | End: 2022-04-18
Payer: COMMERCIAL

## 2022-04-18 ENCOUNTER — APPOINTMENT (OUTPATIENT)
Dept: ULTRASOUND IMAGING | Age: 62
DRG: 871 | End: 2022-04-18
Payer: COMMERCIAL

## 2022-04-18 ENCOUNTER — APPOINTMENT (OUTPATIENT)
Dept: CT IMAGING | Age: 62
DRG: 871 | End: 2022-04-18
Payer: COMMERCIAL

## 2022-04-18 ENCOUNTER — APPOINTMENT (OUTPATIENT)
Dept: GENERAL RADIOLOGY | Age: 62
DRG: 871 | End: 2022-04-18
Payer: COMMERCIAL

## 2022-04-18 PROBLEM — S30.1XXA RECTUS SHEATH HEMATOMA: Status: ACTIVE | Noted: 2022-04-18

## 2022-04-18 PROBLEM — N17.9 AKI (ACUTE KIDNEY INJURY) (HCC): Status: RESOLVED | Noted: 2022-04-17 | Resolved: 2022-04-18

## 2022-04-18 PROBLEM — D50.9 IRON DEFICIENCY ANEMIA: Status: ACTIVE | Noted: 2022-04-18

## 2022-04-18 PROBLEM — D62 ACUTE BLOOD LOSS ANEMIA: Status: ACTIVE | Noted: 2022-04-18

## 2022-04-18 LAB
ABSOLUTE EOS #: 0.2 K/UL (ref 0–0.4)
ABSOLUTE LYMPH #: 1.7 K/UL (ref 1–4.8)
ABSOLUTE MONO #: 1.4 K/UL (ref 0.1–1.3)
ANION GAP SERPL CALCULATED.3IONS-SCNC: 7 MMOL/L (ref 9–17)
APPEARANCE FLUID: NORMAL
BASOPHILS # BLD: 1 % (ref 0–2)
BASOPHILS ABSOLUTE: 0.1 K/UL (ref 0–0.2)
BUN BLDV-MCNC: 22 MG/DL (ref 8–23)
CALCIUM SERPL-MCNC: 7.8 MG/DL (ref 8.6–10.4)
CHLORIDE BLD-SCNC: 100 MMOL/L (ref 98–107)
CO2: 25 MMOL/L (ref 20–31)
COLOR FLUID: NORMAL
CREAT SERPL-MCNC: 0.79 MG/DL (ref 0.7–1.2)
EKG ATRIAL RATE: 114 BPM
EKG P AXIS: -12 DEGREES
EKG P-R INTERVAL: 178 MS
EKG Q-T INTERVAL: 300 MS
EKG QRS DURATION: 76 MS
EKG QTC CALCULATION (BAZETT): 413 MS
EKG R AXIS: -21 DEGREES
EKG T AXIS: 99 DEGREES
EKG VENTRICULAR RATE: 114 BPM
EOSINOPHILS RELATIVE PERCENT: 2 % (ref 0–4)
GFR AFRICAN AMERICAN: >60 ML/MIN
GFR NON-AFRICAN AMERICAN: >60 ML/MIN
GFR SERPL CREATININE-BSD FRML MDRD: ABNORMAL ML/MIN/{1.73_M2}
GLUCOSE BLD-MCNC: 120 MG/DL (ref 70–99)
HCT VFR BLD CALC: 22.6 % (ref 41–53)
HCT VFR BLD CALC: 22.6 % (ref 41–53)
HCT VFR BLD CALC: 23.5 % (ref 41–53)
HCT VFR BLD CALC: 23.6 % (ref 41–53)
HEMOGLOBIN: 7.6 G/DL (ref 13.5–17.5)
HEMOGLOBIN: 7.7 G/DL (ref 13.5–17.5)
HEMOGLOBIN: 7.9 G/DL (ref 13.5–17.5)
HEMOGLOBIN: 7.9 G/DL (ref 13.5–17.5)
LACTATE DEHYDROGENASE: 154 U/L (ref 135–225)
LV EF: 55 %
LVEF MODALITY: NORMAL
LYMPHOCYTES # BLD: 15 % (ref 24–44)
MCH RBC QN AUTO: 27.1 PG (ref 26–34)
MCHC RBC AUTO-ENTMCNC: 32.5 G/DL (ref 31–37)
MCV RBC AUTO: 83.4 FL (ref 80–100)
MONOCYTES # BLD: 13 % (ref 1–7)
PDW BLD-RTO: 13.4 % (ref 11.5–14.9)
PH FLUID: 8
PLATELET # BLD: 248 K/UL (ref 150–450)
PMV BLD AUTO: 6.6 FL (ref 6–12)
POTASSIUM SERPL-SCNC: 4.2 MMOL/L (ref 3.7–5.3)
RBC # BLD: 2.84 M/UL (ref 4.5–5.9)
RBC FLUID: NORMAL /MM3
SEG NEUTROPHILS: 69 % (ref 36–66)
SEGMENTED NEUTROPHILS ABSOLUTE COUNT: 7.8 K/UL (ref 1.3–9.1)
SODIUM BLD-SCNC: 132 MMOL/L (ref 135–144)
SPECIMEN TYPE: NORMAL
SPECIMEN TYPE: NORMAL
TOTAL PROTEIN: 5.8 G/DL (ref 6.4–8.3)
WBC # BLD: 11.2 K/UL (ref 3.5–11)
WBC FLUID: 4068 /MM3

## 2022-04-18 PROCEDURE — 87075 CULTR BACTERIA EXCEPT BLOOD: CPT

## 2022-04-18 PROCEDURE — 74176 CT ABD & PELVIS W/O CONTRAST: CPT

## 2022-04-18 PROCEDURE — 83615 LACTATE (LD) (LDH) ENZYME: CPT

## 2022-04-18 PROCEDURE — 0W993ZZ DRAINAGE OF RIGHT PLEURAL CAVITY, PERCUTANEOUS APPROACH: ICD-10-PCS | Performed by: RADIOLOGY

## 2022-04-18 PROCEDURE — 85018 HEMOGLOBIN: CPT

## 2022-04-18 PROCEDURE — 6370000000 HC RX 637 (ALT 250 FOR IP): Performed by: STUDENT IN AN ORGANIZED HEALTH CARE EDUCATION/TRAINING PROGRAM

## 2022-04-18 PROCEDURE — 2580000003 HC RX 258: Performed by: STUDENT IN AN ORGANIZED HEALTH CARE EDUCATION/TRAINING PROGRAM

## 2022-04-18 PROCEDURE — C8929 TTE W OR WO FOL WCON,DOPPLER: HCPCS

## 2022-04-18 PROCEDURE — 87070 CULTURE OTHR SPECIMN AEROBIC: CPT

## 2022-04-18 PROCEDURE — 93010 ELECTROCARDIOGRAM REPORT: CPT | Performed by: INTERNAL MEDICINE

## 2022-04-18 PROCEDURE — 80048 BASIC METABOLIC PNL TOTAL CA: CPT

## 2022-04-18 PROCEDURE — A4216 STERILE WATER/SALINE, 10 ML: HCPCS | Performed by: SURGERY

## 2022-04-18 PROCEDURE — 36415 COLL VENOUS BLD VENIPUNCTURE: CPT

## 2022-04-18 PROCEDURE — 88185 FLOWCYTOMETRY/TC ADD-ON: CPT

## 2022-04-18 PROCEDURE — 87205 SMEAR GRAM STAIN: CPT

## 2022-04-18 PROCEDURE — 85014 HEMATOCRIT: CPT

## 2022-04-18 PROCEDURE — 83986 ASSAY PH BODY FLUID NOS: CPT

## 2022-04-18 PROCEDURE — 6370000000 HC RX 637 (ALT 250 FOR IP): Performed by: INTERNAL MEDICINE

## 2022-04-18 PROCEDURE — 88112 CYTOPATH CELL ENHANCE TECH: CPT

## 2022-04-18 PROCEDURE — C9113 INJ PANTOPRAZOLE SODIUM, VIA: HCPCS | Performed by: SURGERY

## 2022-04-18 PROCEDURE — 6370000000 HC RX 637 (ALT 250 FOR IP)

## 2022-04-18 PROCEDURE — 2580000003 HC RX 258

## 2022-04-18 PROCEDURE — 32555 ASPIRATE PLEURA W/ IMAGING: CPT

## 2022-04-18 PROCEDURE — 2580000003 HC RX 258: Performed by: SURGERY

## 2022-04-18 PROCEDURE — 2500000003 HC RX 250 WO HCPCS: Performed by: INTERNAL MEDICINE

## 2022-04-18 PROCEDURE — 6360000004 HC RX CONTRAST MEDICATION: Performed by: INTERNAL MEDICINE

## 2022-04-18 PROCEDURE — 84157 ASSAY OF PROTEIN OTHER: CPT

## 2022-04-18 PROCEDURE — 88341 IMHCHEM/IMCYTCHM EA ADD ANTB: CPT

## 2022-04-18 PROCEDURE — 88184 FLOWCYTOMETRY/ TC 1 MARKER: CPT

## 2022-04-18 PROCEDURE — 88342 IMHCHEM/IMCYTCHM 1ST ANTB: CPT

## 2022-04-18 PROCEDURE — 2000000000 HC ICU R&B

## 2022-04-18 PROCEDURE — 99291 CRITICAL CARE FIRST HOUR: CPT | Performed by: INTERNAL MEDICINE

## 2022-04-18 PROCEDURE — 6360000002 HC RX W HCPCS: Performed by: SURGERY

## 2022-04-18 PROCEDURE — 84155 ASSAY OF PROTEIN SERUM: CPT

## 2022-04-18 PROCEDURE — 82945 GLUCOSE OTHER FLUID: CPT

## 2022-04-18 PROCEDURE — 88305 TISSUE EXAM BY PATHOLOGIST: CPT

## 2022-04-18 PROCEDURE — 71045 X-RAY EXAM CHEST 1 VIEW: CPT

## 2022-04-18 PROCEDURE — 85025 COMPLETE CBC W/AUTO DIFF WBC: CPT

## 2022-04-18 PROCEDURE — 6360000002 HC RX W HCPCS: Performed by: STUDENT IN AN ORGANIZED HEALTH CARE EDUCATION/TRAINING PROGRAM

## 2022-04-18 RX ORDER — SENNA PLUS 8.6 MG/1
1 TABLET ORAL ONCE
Status: DISCONTINUED | OUTPATIENT
Start: 2022-04-18 | End: 2022-04-18

## 2022-04-18 RX ORDER — LIDOCAINE HYDROCHLORIDE 10 MG/ML
INJECTION, SOLUTION INFILTRATION; PERINEURAL
Status: DISCONTINUED
Start: 2022-04-18 | End: 2022-04-19

## 2022-04-18 RX ORDER — FUROSEMIDE 20 MG/1
20 TABLET ORAL DAILY
Status: DISCONTINUED | OUTPATIENT
Start: 2022-04-18 | End: 2022-04-21 | Stop reason: HOSPADM

## 2022-04-18 RX ORDER — DOCUSATE SODIUM 100 MG/1
100 CAPSULE, LIQUID FILLED ORAL DAILY
Status: DISCONTINUED | OUTPATIENT
Start: 2022-04-18 | End: 2022-04-18

## 2022-04-18 RX ORDER — SENNA AND DOCUSATE SODIUM 50; 8.6 MG/1; MG/1
2 TABLET, FILM COATED ORAL 2 TIMES DAILY
Status: DISCONTINUED | OUTPATIENT
Start: 2022-04-18 | End: 2022-04-21 | Stop reason: HOSPADM

## 2022-04-18 RX ORDER — LIDOCAINE HYDROCHLORIDE 10 MG/ML
5 INJECTION, SOLUTION EPIDURAL; INFILTRATION; INTRACAUDAL; PERINEURAL ONCE
Status: COMPLETED | OUTPATIENT
Start: 2022-04-18 | End: 2022-04-18

## 2022-04-18 RX ADMIN — METOPROLOL TARTRATE 25 MG: 25 TABLET, FILM COATED ORAL at 20:52

## 2022-04-18 RX ADMIN — FENTANYL CITRATE 50 MCG: 50 INJECTION INTRAMUSCULAR; INTRAVENOUS at 03:56

## 2022-04-18 RX ADMIN — BENZONATATE 200 MG: 200 CAPSULE ORAL at 17:54

## 2022-04-18 RX ADMIN — SENNOSIDES AND DOCUSATE SODIUM 2 TABLET: 50; 8.6 TABLET ORAL at 17:54

## 2022-04-18 RX ADMIN — DOCUSATE SODIUM 100 MG: 100 CAPSULE, LIQUID FILLED ORAL at 10:58

## 2022-04-18 RX ADMIN — BENZONATATE 200 MG: 200 CAPSULE ORAL at 20:51

## 2022-04-18 RX ADMIN — OXYCODONE HYDROCHLORIDE AND ACETAMINOPHEN 1 TABLET: 5; 325 TABLET ORAL at 12:42

## 2022-04-18 RX ADMIN — FENTANYL CITRATE 50 MCG: 50 INJECTION INTRAMUSCULAR; INTRAVENOUS at 01:32

## 2022-04-18 RX ADMIN — Medication 60 MG: at 20:52

## 2022-04-18 RX ADMIN — FENTANYL CITRATE 50 MCG: 50 INJECTION INTRAMUSCULAR; INTRAVENOUS at 06:23

## 2022-04-18 RX ADMIN — FENTANYL CITRATE 50 MCG: 50 INJECTION INTRAMUSCULAR; INTRAVENOUS at 11:16

## 2022-04-18 RX ADMIN — SODIUM CHLORIDE, PRESERVATIVE FREE 10 ML: 5 INJECTION INTRAVENOUS at 13:02

## 2022-04-18 RX ADMIN — Medication 60 MG: at 08:04

## 2022-04-18 RX ADMIN — SODIUM CHLORIDE 40 MG: 9 INJECTION, SOLUTION INTRAMUSCULAR; INTRAVENOUS; SUBCUTANEOUS at 08:04

## 2022-04-18 RX ADMIN — FENTANYL CITRATE 50 MCG: 50 INJECTION INTRAMUSCULAR; INTRAVENOUS at 20:59

## 2022-04-18 RX ADMIN — FUROSEMIDE 20 MG: 20 TABLET ORAL at 12:42

## 2022-04-18 RX ADMIN — FENTANYL CITRATE 50 MCG: 50 INJECTION INTRAMUSCULAR; INTRAVENOUS at 18:50

## 2022-04-18 RX ADMIN — PERFLUTREN 1.65 MG: 6.52 INJECTION, SUSPENSION INTRAVENOUS at 10:21

## 2022-04-18 RX ADMIN — LIDOCAINE HYDROCHLORIDE 5 ML: 10 INJECTION, SOLUTION EPIDURAL; INFILTRATION; INTRACAUDAL; PERINEURAL at 15:15

## 2022-04-18 RX ADMIN — LOSARTAN POTASSIUM 50 MG: 50 TABLET, FILM COATED ORAL at 08:04

## 2022-04-18 RX ADMIN — BENZONATATE 200 MG: 200 CAPSULE ORAL at 08:05

## 2022-04-18 RX ADMIN — FENTANYL CITRATE 50 MCG: 50 INJECTION INTRAMUSCULAR; INTRAVENOUS at 23:01

## 2022-04-18 RX ADMIN — SODIUM CHLORIDE: 9 INJECTION, SOLUTION INTRAVENOUS at 21:03

## 2022-04-18 RX ADMIN — FENTANYL CITRATE 50 MCG: 50 INJECTION INTRAMUSCULAR; INTRAVENOUS at 15:32

## 2022-04-18 RX ADMIN — SODIUM CHLORIDE: 9 INJECTION, SOLUTION INTRAVENOUS at 01:44

## 2022-04-18 ASSESSMENT — ENCOUNTER SYMPTOMS
SHORTNESS OF BREATH: 0
BLOOD IN STOOL: 0
CHOKING: 0
CONSTIPATION: 1
ALLERGIC/IMMUNOLOGIC NEGATIVE: 1
ANAL BLEEDING: 0
ABDOMINAL DISTENTION: 0
EYES NEGATIVE: 1
WHEEZING: 0
COUGH: 0
DIARRHEA: 0
STRIDOR: 0
VOMITING: 0
NAUSEA: 0
ABDOMINAL PAIN: 1
CHEST TIGHTNESS: 0
APNEA: 0
RESPIRATORY NEGATIVE: 1
COLOR CHANGE: 1

## 2022-04-18 ASSESSMENT — PAIN DESCRIPTION - LOCATION
LOCATION: ABDOMEN

## 2022-04-18 ASSESSMENT — PAIN SCALES - GENERAL
PAINLEVEL_OUTOF10: 10
PAINLEVEL_OUTOF10: 8
PAINLEVEL_OUTOF10: 8
PAINLEVEL_OUTOF10: 7
PAINLEVEL_OUTOF10: 4
PAINLEVEL_OUTOF10: 8
PAINLEVEL_OUTOF10: 10
PAINLEVEL_OUTOF10: 8
PAINLEVEL_OUTOF10: 10
PAINLEVEL_OUTOF10: 4
PAINLEVEL_OUTOF10: 7
PAINLEVEL_OUTOF10: 9

## 2022-04-18 ASSESSMENT — PAIN DESCRIPTION - FREQUENCY
FREQUENCY: CONTINUOUS
FREQUENCY: CONTINUOUS
FREQUENCY: INTERMITTENT
FREQUENCY: INTERMITTENT

## 2022-04-18 ASSESSMENT — PAIN DESCRIPTION - ORIENTATION
ORIENTATION: RIGHT
ORIENTATION: RIGHT;LOWER
ORIENTATION: RIGHT
ORIENTATION: RIGHT

## 2022-04-18 ASSESSMENT — PAIN DESCRIPTION - DESCRIPTORS
DESCRIPTORS: ACHING
DESCRIPTORS: ACHING
DESCRIPTORS: ACHING;DISCOMFORT
DESCRIPTORS: ACHING;DISCOMFORT

## 2022-04-18 ASSESSMENT — PAIN DESCRIPTION - PAIN TYPE
TYPE: ACUTE PAIN

## 2022-04-18 NOTE — PROGRESS NOTES
Patient was seen and examined. Awake alert in no acute distress. Had thoracentesis at the bedside. On full liquids. Afebrile vital signs are stable. Abdominal pain is better. Abdomen is soft. Ecchymosis is stable. Extremity nontender. Blood work reviewed. Hemoglobin has been stable around 7.9. WBC count is improved to 11.2. Platelet count is adequate. Sodium potassium creatinine all normal.    Rectus sheath hematoma. Aspirin Plavix on hold. Hemoglobin is stable. Advance to low fiber diet. Continue medical management. No surgical intervention. Cardiology on the case.

## 2022-04-18 NOTE — CARE COORDINATION
CALLED TO VERIFY INSURANCE. WAS UNABLE TO LEAVE MESSAGE.  NO VOICE MAIL/ANSWERING MACHINE ONGOING DISCHARGE PLAN:    Patient is alert and oriented x4. Spoke with patient regarding discharge plan and patient confirms that plan is still home with wife with no needs for VNS. Following for home O2, pt is on O2 here at 2lpm [place]. Thoracentesis on hold due to increase abdominal distention , pt going for CT abd/pelvis to evaluate for increase in bleeding. Rectus hematoma is being watched, no drainage necessary. Will continue to follow for additional discharge needs.     Electronically signed by Lakshmi Mckeon RN on 4/18/2022 at 12:10 PM

## 2022-04-18 NOTE — PROGRESS NOTES
[id number]    PROGRESS NOTE             4/18/2022    9:21 AM    Name:   Priyank Juarez  MRN:     [id number]     Acct:      [de-identified]   Room:   2012/2012-01  IP Day:  2  Admit Date:  4/16/2022  5:31 PM    PCP:  Colleen Barrett MD  Code Status:  Full Code    Subjective:     C/C:   Chief Complaint   Patient presents with    Cough     Interval History Status: improved. Was seen and examined at bedside. No acute events overnight. Sellers catheter was removed yesterday without incident. Patient able to void independently. Patient states that he is experiencing some abdominal fullness, abdominal pain today, stating that he had difficulty passing a bowel movement yesterday and feels that he still needs to pass bowel movement. He reports his cough is still present, however somewhat improved from yesterday. Hemoglobin is stable for the last two H&H checks, no transfusion given. Unclear if there will be hematoma evacuation today by IR or general surgery, patient made n.p.o. with sips with meds in case decision is made to evacuate. Bedside thoracentesis planned today    Brief History:     The patient is a 59 y.o. Non- / non  male who presents withCough   and he is admitted to the hospital for the management of right pleural effusion.     Patient reports that he was home for the last week, and had recently caught a cold from his wife, had a brief viral illness with cough that persisted. Patient reports that he then felt ill, and developed cough, which time he had coughing fits last week when he would cough strongly for hours. Patient reports that on Friday he did go to see his outpatient doctor in the office but then presented to the emergency department the following day for extreme chest pain, shortness of breath. He was found to be hypotensive in the emergency department and was bolused 2 L of fluid.   Met SIRS criteria 2 out of 4 with tachycardia, white blood cell count, and did have low blood pressure 61/51 on presentation.     Initial work-up for sepsis was performed, blood cultures drawn, fluid bolus given, maintenance fluids given, imaging showed large right pleural effusion, right rectus sheath hematoma.      Pro-Taz was slightly elevated, however given imaging, pulmonology does not feel strongly about any antibiotics at this time. Patient is from home and does not have any predisposing factors for Pseudomonas infection     Patient is saturating mid 90s on 2 L nasal cannula, does not use home oxygen. Possible history of obstructive sleep apnea, ported the patient had an home sleep apnea study which was negative.      Patient reports today he is doing fine, slightly better than yesterday, however reports he is still coughing quite a bit which causes him a lot of right-sided discomfort     4/17: Plan today is possible bedside thoracentesis by pulmonology, IR has been consulted for possible hematoma evacuation. Discontinued antibiotics due to low suspicion for bacterial cause of pleural effusion. 4/18:     Review of Systems:     Review of Systems   Constitutional: Negative for appetite change, chills, diaphoresis, fatigue and fever. HENT: Negative. Eyes: Negative. Respiratory: Negative. Negative for apnea, cough, choking, chest tightness, shortness of breath, wheezing and stridor. Cardiovascular: Positive for chest pain (Right-sided rib area). Negative for palpitations and leg swelling. Gastrointestinal: Positive for abdominal pain and constipation. Negative for abdominal distention, anal bleeding, blood in stool, diarrhea, nausea and vomiting. Endocrine: Negative. Genitourinary: Negative. Negative for decreased urine volume, difficulty urinating, dysuria and urgency. Musculoskeletal: Negative. Skin: Positive for color change (brusing right rib cage). Allergic/Immunologic: Negative. Neurological: Negative. Hematological: Negative. Psychiatric/Behavioral: Negative. Medications: Allergies:  No Known Allergies    Current Meds:   Scheduled Meds:    docusate sodium  100 mg Oral Daily    benzonatate  200 mg Oral TID    dextromethorphan  60 mg Oral 2 times per day    ferrous sulfate  325 mg Oral Every Other Day    metoprolol tartrate  25 mg Oral BID    losartan  50 mg Oral Daily    sodium chloride flush  5-40 mL IntraVENous 2 times per day    pantoprazole (PROTONIX) 40 mg injection  40 mg IntraVENous Daily     Continuous Infusions:    sodium chloride      sodium chloride 75 mL/hr at 22 0144     PRN Meds: perflutren lipid microspheres, HYDROcodone-homatropine, oxyCODONE-acetaminophen, sodium chloride flush, sodium chloride flush, sodium chloride, ondansetron **OR** ondansetron, fentanNYL, acetaminophen    Data:     Past Medical History:   has a past medical history of CAD (coronary artery disease), Cerebral artery occlusion with cerebral infarction ([facility] [place] [address] [facility] Hx of heart artery stent, Hyperlipidemia, Hypertension, and MI [facility]myocardial [facility]) [facility] Social History:   reports that he quit smoking about 35 years ago. His smoking use included cigarettes. He started smoking about 47 years ago. He has a 30.00 pack-year smoking history. He has never used smokeless tobacco. He reports current alcohol use. He reports that he does not use drugs.      Family History:   Family History   Problem Relation Age of Onset    Stroke Mother     Heart Disease Mother     Heart Disease Father     Diabetes Father     Stroke Father        Vitals:  /87   Pulse 104   Temp 97.8 °F (36.6 °C) (Oral)   Resp 18   Ht 6' 1\" (1.854 m)   Wt (!) 300 lb 14.9 oz (136.5 kg)   SpO2 95%   BMI 39.70 kg/m²   Temp (24hrs), Av.6 °F (37 °C), Min:97.8 °F (36.6 °C), Max:99.2 °F (37.3 °C)    No results for input(s): POCGLU in the last 67 hours.    I/O(24Hr): Intake/Output Summary (Last 24 hours) at 4/18/2022 0921  Last data filed at 4/18/2022 0630  Gross per 24 hour   Intake 1118.4 ml   Output 1450 ml   Net -331.6 ml       Labs:  [unfilled]    No results found for: SPECIAL  Lab Results   Component Value Date/Time    CULTURE NO GROWTH 1 DAY 04/16/2022 06:49 PM       [unfilled]    Radiology:    XR CHEST PORTABLE    Result Date: 4/16/2022  EXAMINATION: ONE XRAY VIEW OF THE CHEST 4/16/2022 2:49 pm COMPARISON: March 14 2022 HISTORY: ORDERING SYSTEM PROVIDED HISTORY: hypotension, SOB TECHNOLOGIST PROVIDED HISTORY: hypotension, SOB Reason for Exam: hypotension, SOB FINDINGS: Marginal inspiration is noted. A large right pleural effusion is noted, left basilar opacification, likely due to a combination of effusion, atelectasis and or consolidation. Cardiomegaly is noted. Vascular markings are distinct. No pneumothorax noted. No acute osseous abnormalities present. 1. Large right pleural effusion 2. Right basilar opacification, which may be due to a combination of atelectasis, effusion and or consolidation. CTA CHEST ABDOMEN PELVIS W CONTRAST    Result Date: 4/16/2022  EXAMINATION: CTA OF THE CHEST, ABDOMEN AND PELVIS WITH CONTRAST 4/16/2022 3:30 pm: TECHNIQUE: CTA of the chest, abdomen and pelvis was performed after the administration of intravenous contrast.  Multiplanar reformatted images are provided for review. MIP images are provided for review. Dose modulation, iterative reconstruction, and/or weight based adjustment of the mA/kV was utilized to reduce the radiation dose to as low as reasonably achievable.  COMPARISON: Chest x-ray dated April 16, 2022 HISTORY: ORDERING SYSTEM PROVIDED HISTORY: hypotension, Chest pain, r/o disection TECHNOLOGIST PROVIDED HISTORY: hypotension, Chest pain, r/o disection Decision Support Exception - unselect if not a suspected or confirmed emergency medical condition->Emergency Medical Condition (MA) Reason for Exam: hypotension, Chest pain, r/o disection Relevant Medical/Surgical History: chf, stents, cardiac cath, appendectomy FINDINGS: CTA CHEST: Thoracic aorta: No evidence of thoracic aortic aneurysm or dissection. No acute abnormality of the aorta. Mediastinum: No adenopathy is present. Normal enhancement of the pulmonary arterial system is present. Extensive coronary arterial calcifications are noted. Small pericardial effusion is present. Fatty changes are present involving the apex of the left ventricle, related to prior infarct. Lungs/Pleura: A large right pleural effusion is present, with compressive atelectasis involving the right lower lobe. Superimposed infection is difficult to exclude. The trachea and mainstem bronchi appear normal. Paraseptal emphysema is present within the right apex. Minimal patchy airspace disease is present within the left upper lobe, likely infectious in etiology. Soft Tissues/Bones: No acute osseous abnormality is present involving the thorax. Asymmetric enlargement of the right serratus anterior muscle is present, compared to the left. This may represent intramuscular hematoma or inflammation/myositis. CTA ABDOMEN: Abdominal aorta/Branches: Normal enhancement of the abdominal aorta and branch vessels is present without evidence of aneurysm formation or dissection. Organs: Hepatic steatosis is present. The gallbladder, pancreas, spleen, adrenal glands, and kidneys demonstrate no acute abnormality. GI/Bowel: Stomach appears normal.  Small bowel appears normal without evidence of obstruction. No evidence of appendicitis is present. Scattered colonic diverticula, without evidence of diverticulitis. Peritoneum/Retroperitoneum: No adenopathy is present. No free fluid is present within the abdomen. No free air is noted. Bones/Soft Tissues: No acute osseous abnormality is present involving the abdomen.   Marked enlargement of the right rectus sheath and rectus abdominus muscle is present, consistent with a large rectus sheath hematoma. Hematoma extends superiorly, along the anterolateral right chest wall, likely involving the serrated anterior muscle is well. Right rectus sheath hematoma measures approximately 15-16 cm in transverse dimension, 4-5 cm in AP dimension, and approximately 16 cm in cephalocaudal dimension. No evidence of active bleeding is present. Soft tissue stranding is present throughout the subcutaneous fat of the right flank and anterior abdominal wall. CTA PELVIS: Aorta/Iliacs: Normal enhancement of the aortic bifurcation, common iliac, internal and external iliac arteries common femoral arteries is noted bilaterally without evidence of aneurysm formation or dissection. Other: Urinary bladder is nondistended. Prostate gland is normal size. No free fluid or free air is present within the pelvis. Bones/Soft Tissues: No acute osseous abnormality is present involving the pelvis. A small umbilical hernia is present containing fat. 1. No evidence of dissection, aneurysm formation, or intramural hematoma formation involving the thoracic or abdominal aorta 2. Large right pleural effusion, with atelectasis involving the right lower lobe. Superimposed infection is difficult to exclude 3. Large right rectus sheath hematoma, measuring approximately 15-16 cm in transverse dimension, 4-5 cm in AP dimension, and approximately 16 cm in cephalocaudal dimension 4. Asymmetric enlargement of the right serratus anterior muscle alyce may related to intramuscular hematoma formation, or myositis. 5.  Critical results were called by Dr. Lulu Horta to Dr. Ama Pérez on 4/16/2022 at 7:11 p.m. hours. Physical Examination:        Physical Exam  Vitals and nursing note reviewed. Constitutional:       General: He is not in acute distress. Appearance: He is obese. He is not ill-appearing, toxic-appearing or diaphoretic.    HENT:      Head: Normocephalic and atraumatic. Nose: Nose normal.      Mouth/Throat:      Mouth: Mucous membranes are moist.   Eyes:      Extraocular Movements: Extraocular movements intact. Cardiovascular:      Rate and Rhythm: Regular rhythm. Tachycardia present. Heart sounds: Normal heart sounds. No murmur heard. No friction rub. No gallop. Pulmonary:      Effort: Pulmonary effort is normal. No respiratory distress. Breath sounds: Normal breath sounds. No stridor. No wheezing, rhonchi or rales. Chest:      Chest wall: No tenderness. Abdominal:      General: Bowel sounds are normal.      Tenderness: There is abdominal tenderness. Musculoskeletal:         General: No swelling or tenderness. Normal range of motion. Cervical back: Normal range of motion. Right lower leg: No edema. Left lower leg: No edema. Skin:     General: Skin is warm and dry. Findings: Bruising (Right rib cage) present. Neurological:      Mental Status: He is alert. Psychiatric:         Mood and Affect: Mood normal.         Behavior: Behavior normal.         Thought Content:  Thought content normal.         Judgment: Judgment normal.           Assessment:        Primary Problem  Pleural effusion    Active Hospital Problems    Diagnosis Date Noted    Iron deficiency anemia [D50.9] 04/18/2022    Acute blood loss anemia [D62] 04/18/2022    Rectus sheath hematoma [S30.1XXA] 04/18/2022    Primary hypertension [I10] 04/17/2022    Pleural effusion [J90] 04/16/2022       Plan:        Severe sepsis with right pleural effusion secondary to severe cough, likely due to viral pneumonia versus recent change to losartan from lisinopril; Septic shock ruled out due to responsiveness of hypotension to fluid bolus -resolved  -Initial blood pressure 61/51, responded to 2 L fluid bolus  -initial lactic acid 2.8, trended to 2.4  -WBCs 21.3>13.5>11.2  -temperature on admission 97.8  -RR on admission 18  -HR on admission 118  -Pending bedside thoracentesis per pulm  -Antitussives, pain management  -Procalcitonin 0.11  -Cefepime, vancomycin started discontinued due to marginally elevated Pro-Taz, unlikely bacterial cause of sepsis  -Blood cultures x2 no growth to date  -MRSA nasal swab negative  -Influenza A, B, not detected  -SARS-CoV-2 not detected  -Viral respiratory panel negative for viruses tested  -Urinalysis and urine culture obtained, pending  -proBNP 253  -Saturating in the mid 90s on 2 L nasal cannula    Right rectus sheath hematoma  -Continue to monitor, possibly IR guided aspiration  -IR consulted  -Antitussives, pain management  -Fentanyl 50 mcg IV every 2 hours as needed for pain  -Percocet 5/3/2025 every 4 hours as needed when tolerating p.o.  -Pending general surgery recommendations for conservative versus OR treatment of hematoma     Acute kidney injury - resolved  -Baseline 0.8  -On admission 1.86>1.27>0.79  -Continue resuscitation, decrease fluids to 50 mL an hour normal saline while n.p.o.     Combination acute blood loss, dilutional, and iron deficiency anemia  -Hemoglobin 10.7>8.3>8.0>7.9>7.7>7.7  -iron studies showed KIMI  -ferrous sulfate started QOD  -Transfuse if hemoglobin less than 7  -H&H Q6H     Possible DELLA  -Pulmonology recommends outpatient sleep lab study.     History of hypertension  -Home Cozaar 50 mg p.o. daily, Lopressor 25 mg p.o. twice daily started    History of multiple cardiac stents  -On Plavix at home, hold for bedside thoracentesis and possible hematoma evacuation per IR     Diet  -N.p.o. sips with meds in case surgery decides to take to the OR for hematoma evacuation     DVT prophylaxis  -Pneumatic compression devices  -Hold Lovenox due to bedside thoracentesis, possible IR guided hematoma evacuation, hematoma and anemia     GI prophylaxis  -Protonix    Disposition: Bedside thoracentesis today.   Pending general surgery recommendations to see if they will evacuate hematoma or continue conservative management at this time. Restart Lovenox for DVT prophylaxis and Plavix after procedures. Giorgi Leonard MD  4/18/2022  9:21 AM       Attending Physician Statement  I have discussed the care of [address] and I have examined the patient myselft and taken ros and hpi , including pertinent history and exam findings,  with the resident. I have reviewed the key elements of all parts of the encounter with the resident. I agree with the assessment, plan and orders as documented by the resident.       Severe sepsis with right pleural effusion,  Severe cough,  Acute respiratory distress,  Acute respiratory failure on oxygen  Viral respiratory panel negative,  Systolic heart failure, EF of 50% in 2021, repeat echo,  Acute blood loss anemia,  Acute kidney injury, getting better,  Coronary disease with multiple cardiac stents in the past,  Obstructive sleep apnea, obesity hypoventilation syndrome,  Prognosis guarded,  Critical care time spent 36 minutes  Right rectus sheath hematoma    Electronically signed by Adeline Goldmann, MD

## 2022-04-18 NOTE — CONSULTS
[facility]    Consult                        Today's Date: 4/18/2022  Patient Name: Layne Rios  Date of admission: 4/16/2022  5:31 PM  Patient's age: 59 y.o., 1960  Admission Dx: Pleural effusion [J90]  ISA (acute kidney injury) ([facility] [N17.9]  Hematoma of rectus sheath, initial encounter [S30.1XXA]  Sepsis with acute renal failure and septic shock, due to unspecified organism, unspecified acute renal failure type ([facility] [address] [A41.9, R65.21, N17.9]    Reason for Consult:  Cardiac evaluation    Requesting Physician: David Sinclair MD    CHIEF COMPLAINT:  Abdominal pain    History Obtained From:  patient, electronic medical record    HISTORY OF PRESENT ILLNESS:      The patient is a 59 y.o.  male who is admitted to the hospital for abdominal pain. Cardiology consulted for pre-op clearance. Layne Rios is a 59 y.o. male who presents with abdominal pain. Bruising on the right flank. Patient is on Plavix and aspirin. History of coronary artery disease. Sudden onset. Feels better this morning compared to yesterday. No urinary symptoms. No other acute bowel issues. CT scan protocol reviewed. Symptom onset has been acute for a time period of few day(s). Severity is described as moderate to severe. Course of his symptoms over time is acute. Rigth rectus sheath hematoma. Past Medical History:   has a past medical history of CAD (coronary artery disease), Cerebral artery occlusion with cerebral infarction ([address] CHF (congestive heart failure) ([address] Hx of heart artery stent, Hyperlipidemia, Hypertension, and MI (myocardial infarction) ([address] Past Surgical History:   has a past surgical history that includes Appendectomy; Tonsillectomy; Cardiac surgery; Cardiac catheterization (09/13/2021); Cardiac catheterization; and Cardiac catheterization. Home Medications:    Prior to Admission medications    Medication Sig Start Date End Date Taking?  Authorizing Provider   ketorolac (TORADOL) 10 MG tablet Take 1 tablet by mouth every 6 hours as needed for Pain 4/15/22 4/15/23  Lis Serrano MD   docusate sodium (COLACE) 100 MG capsule Take 1 capsule by mouth 2 times daily  Patient not taking: Reported on 4/16/2022 4/15/22 5/15/22  Lis Serrano MD   furosemide (LASIX) 20 MG tablet take 1 tablet by mouth once daily 3/31/22   Historical Provider, MD   clopidogrel (PLAVIX) 75 MG tablet take 1 tablet by mouth once daily 3/31/22   Historical Provider, MD   atorvastatin (LIPITOR) 80 MG tablet Take 1 tablet by mouth nightly  Patient not taking: Reported on 4/16/2022 9/14/21   NICKI Finch CNP   nitroGLYCERIN (NITROSTAT) 0.4 MG SL tablet Place 1 tablet under the tongue every 5 minutes as needed for Chest pain up to max of 3 total doses. If no relief after 1 dose, call 911. Patient not taking: Reported on 4/16/2022 9/14/21   NICKI Finch CNP   metoprolol tartrate (LOPRESSOR) 25 MG tablet Take 1 tablet by mouth 2 times daily 9/14/21   NICKI Finch CNP   losartan (COZAAR) 50 MG tablet Take 50 mg by mouth daily    Historical Provider, MD   aspirin 81 MG chewable tablet Take 1 tablet by mouth daily 4/20/21   Flower Whitaker MD       Allergies:  Patient has no known allergies. Social History:   reports that he quit smoking about 35 years ago. His smoking use included cigarettes. He started smoking about 47 years ago. He has a 30.00 pack-year smoking history. He has never used smokeless tobacco. He reports current alcohol use. He reports that he does not use drugs. Family History: family history includes Diabetes in his father; Heart Disease in his father and mother; Stroke in his father and mother. No h/o sudden cardiac death. No for premature CAD    REVIEW OF SYSTEMS:    Constitutional: there has been no unanticipated weight loss. There's been No change in energy level, No change in activity level. Eyes: No visual changes or diplopia. No scleral icterus.   ENT: No Headaches, hearing loss or vertigo. No mouth sores or sore throat. Cardiovascular: see above  Respiratory: see above  Gastrointestinal: No abdominal pain, appetite loss, blood in stools. Genitourinary: No dysuria, trouble voiding, or hematuria. Musculoskeletal:  No gait disturbance, No weakness or joint complaints. Integumentary: No rash or pruritis. Neurological: No headache or diplopia. No tingling  Psychiatric: No anxiety, or depression. Endocrine: No temperature intolerance. Hematologic/Lymphatic: No abnormal bruising or bleeding, blood clots or swollen lymph nodes. Allergic/Immunologic: No nasal congestion or hives. PHYSICAL EXAM:      /87   Pulse 104   Temp 97.8 °F (36.6 °C) (Oral)   Resp 18   Ht 6' 1\" (1.854 m)   Wt (!) 300 lb 14.9 oz (136.5 kg)   SpO2 95%   BMI 39.70 kg/m²    Constitutional and General Appearance: alert, cooperative, no distress and appears stated age  HEENT: PERRL, no cervical lymphadenopathy. No masses palpable. Normal oral mucosa  Respiratory:  Normal excursion and expansion without use of accessory muscles  Resp Auscultation: Good respiratory effort. No for increased work of breathing. On auscultation: clear to auscultation bilaterally  Cardiovascular:  Heart tones are crisp and normal. regular S1 and S2.  Jugular venous pulsation Normal  The carotid upstroke is normal in amplitude and contour without delay or bruit   Abdomen:   soft  Bowel sounds present  Extremities:   No edema  Neurological:  Alert and oriented. DATA:    Diagnostics:    EKG: nonspecific ST and T waves changes, sinus tachycardia. ECHO: previously taken 5/2021 and show as below. Ejection fraction: 50%  Stress Test: previously taken 8/2021 and show as below. Cardiac Angiography: previously taken 8/2021 and show as below. Office H&P  1. CAD with PTCA and stent of the LAD in 2002.      2. Repeat heart catheterization in 2005 suggesting patent stent of the LAD, also required PTCA for input(s): CKTOTAL, CKMB, CKMBINDEX, TROPONINI in the last 72 hours. FASTING LIPID PANEL:  Lab Results   Component Value Date    HDL 37 09/13/2021    TRIG 125 09/13/2021     LIVER PROFILE:  Recent Labs     04/16/22  1745   AST 11   ALT 17   LABALBU 3.9       IMPRESSION:    Patient Active Problem List   Diagnosis    Cerebrovascular accident (CVA) ([address]    Hypertensive urgency    TIA (transient ischemic attack)    CAD in native artery    Expressive aphasia    Transient ischemic attack    History of TIA (transient ischemic attack)    DELLA (obstructive sleep apnea)    Pleural effusion    Primary hypertension    Iron deficiency anemia    Acute blood loss anemia    Rectus sheath hematoma     1. Septic shock  2. Pneumonia with right pleural effusion  3. Right rectus sheath hematoma  4. ISA  5. Acute blood loss Anemia  6. Hx of CAD with PTCA/LORAINE to RCA x2 9/2021  7. ICMP with improved LVEF on recent echo to 50-55%  8. HTN  9. HLP    RECOMMENDATIONS:  Stable from CV standpoint. Denies any chest pain or SOB. Labs, vitals, & tele reviewed. Recent echo reviewed. Discussed with RN. Currently no plans for OR today. Reviewed with Dr. Binh Sutherland. Low-mild risk from CV standpoint. Resume oral AC as soon as possibly safe. Repeat CT ordered today per RN. Please call with further questions/concerns. Discussed with patient and Nurse    NICKI Gee - CNP    Attending Cardiologist Addendum: I have reviewed the history, physical, subjective, objective, assessment, and plan with the CNP and agree with the note. I have made changes to the note above as needed. Thank you for allowing me to participate in the care of this patient, please do not hesitate to call if you have any questions. Fatoumata Moncada, [id number] [facility]  [facility]. [facility]  [phone]

## 2022-04-18 NOTE — PROGRESS NOTES
RN called answering service and left message for new consult for Dr. Buzz Mcghee. Santa Zarate from answering service said \"Kaity\" would be rounding today.

## 2022-04-18 NOTE — PROGRESS NOTES
ICU Progress Note (Non-Vent)  [facility]    Patient - Lisa Dahl,  Age - 59 y.o.    - 1960      Room Number -    MRN -  [id number]   Acct # - [de-identified]  Date of Admission -  2022  5:31 PM    Events of Past 24 Hours   Pts breathing and cough is improved but states overall he feels worse than yesterday. He attributes most of his discomfort to abdominal pressure. He has not had a normal BM in several days. He had intermittent diarrhea this AM. Last BM prior to admission was very hard. He is very tired and uncomfortable and does not fully engage in questioning. Vitals    height is 6' 1\" (1.854 m) and weight is 300 lb 14.9 oz (136.5 kg) (abnormal). His oral temperature is 97.8 °F (36.6 °C). His blood pressure is 152/101 (abnormal) and his pulse is 111. His respiration is 19 and oxygen saturation is 93%.        Temperature Range: Temp: 97.8 °F (36.6 °C) Temp  Av.6 °F (37 °C)  Min: 97.8 °F (36.6 °C)  Max: 99.2 °F (37.3 °C)  BP Range:  Systolic (13LUM), WEP:058 , Min:94 , VWF:088     Diastolic (67ETN), ECS:82, Min:67, Max:112    Pulse Range: Pulse  Av.4  Min: 100  Max: 121  Respiration Range: Resp  Av.3  Min: 16  Max: 24  Current Pulse Ox[de-identified]  SpO2: 93 %  24HR Pulse Ox Range:  SpO2  Av.3 %  Min: 89 %  Max: 95 %  Oxygen Amount and Delivery: O2 Flow Rate (L/min): 2 L/min    Wt Readings from Last 3 Encounters:   22 (!) 300 lb 14.9 oz (136.5 kg)   04/15/22 292 lb (132.5 kg)   22 287 lb 3.2 oz (130.3 kg)     I/O       Intake/Output Summary (Last 24 hours) at 2022 0859  Last data filed at 2022 0630  Gross per 24 hour   Intake 1118.4 ml   Output 1450 ml   Net -331.6 ml     DRAIN/TUBE OUTPUT       Invasive Lines   ICP PRESSURE RANGE  No data recorded  CVP PRESSURE RANGE  No data recorded      Medications      benzonatate  200 mg Oral TID    dextromethorphan  60 mg Oral 2 times per day    ferrous sulfate  325 mg Oral Every Other Day    metoprolol tartrate  25 mg Oral BID    losartan  50 mg Oral Daily    sodium chloride flush  5-40 mL IntraVENous 2 times per day    pantoprazole (PROTONIX) 40 mg injection  40 mg IntraVENous Daily     perflutren lipid microspheres, HYDROcodone-homatropine, oxyCODONE-acetaminophen, sodium chloride flush, sodium chloride flush, sodium chloride, ondansetron **OR** ondansetron, fentanNYL, acetaminophen  IV Drips/Infusions   sodium chloride      sodium chloride 75 mL/hr at 04/18/22 0144       Diet/Nutrition   Diet NPO Exceptions are: [employer], Sips of Water with Meds    Exam      Constitutional - Alert, arousable. Uncomfortable on exam.   General Appearance  well developed, well nourished  HEENT -normocephalic, atraumatic. Lungs - Increased work of breathing. 2L O2 nsl. Chest expands equally, no wheezes, rales or rhonchi. Cardiovascular - Heart sounds are normal.  normal rate and rhythm regular, no murmur, gallop or rub. Abdomen - soft and distended, diffuse tenderness/pressure throughout. No rebound tenderness, no guarding. Unable to palpate for masses or organomegaly secondary to fullness. Neurologic - There are no focal motor deficits  Skin - Bruising and tenderness to R side of abdomen.  No bleeding my: Bruising appears to be worse  Extremities - no cyanosis, clubbing or edema    Lab Results   CBC     Lab Results   Component Value Date    WBC 11.2 04/18/2022    RBC 2.84 04/18/2022    HGB 7.7 04/18/2022    HCT 23.6 04/18/2022     04/18/2022    MCV 83.4 04/18/2022    MCH 27.1 04/18/2022    MCHC 32.5 04/18/2022    RDW 13.4 04/18/2022    LYMPHOPCT 15 04/18/2022    MONOPCT 13 04/18/2022    BASOPCT 1 04/18/2022    MONOSABS 1.40 04/18/2022    LYMPHSABS 1.70 04/18/2022    EOSABS 0.20 04/18/2022    BASOSABS 0.10 04/18/2022    DIFFTYPE NOT REPORTED 04/19/2021       BMP   Lab Results   Component Value Date    NA 132 04/18/2022    K 4.2 04/18/2022     04/18/2022    CO2 25 04/18/2022    BUN 22 04/18/2022    CREATININE 0.79 04/18/2022    GLUCOSE 120 04/18/2022       LFTS  Lab Results   Component Value Date    ALKPHOS 62 04/16/2022    ALT 17 04/16/2022    AST 11 04/16/2022    PROT 6.2 04/16/2022    BILITOT 0.47 04/16/2022    LABALBU 3.9 04/16/2022       ABG ABGs: No results found for: PHART, PO2ART, RAR7HPW    Lab Results   Component Value Date    MODE NOT REPORTED 04/19/2021         INR  Recent Labs     04/16/22  1745 04/17/22  0421   PROTIME 13.8 15.2*   INR 1.1 1.2       APTT  Recent Labs     04/16/22 1745   APTT 31.8       Lactic Acid  No results found for: LACTA     BNP   No results for input(s): BNP in the last 72 hours. Cultures       Radiology     CXR   Large right pleural effusion    CT Scans  Large right pleural effusion    (See actual reports for details)      SYSTEMS ASSESSMENT  Severe cough-most likely viral no obvious infiltrate seen on chest x-ray CT scan  Right pleural effusion  Right rectus sheath hematoma  Hypotension-resolved  Acute kidney injury-improved  Coronary disease status post 6 stents  History of hypertension  Morbid obesity  Probable DELLA  Non-smoker  Full code    Neuro   No focal deficits    Respiratory   Wean oxygen as tolerated. Keep O2 sat > 88%  Large R pleural effusion - thoracentesis to drain    Cardiovascular   No evidence of dissection, aneurysm, or hematoma on CT     Gastrointestinal   Abd Xray r/o obstruction    Renal   BUN and creatinine improved - within normal limits    Infectious Disease   WBC elevated at 11.2 but improving    Hematology/Oncology   H&H stable    Endocrine   Glucose elevated - monitor glucose    Social/Spiritual/DNR/Disposition/Other       Critical Care Time   0 min    Electronically signed by BRYSON Elise on 4/18/2022 at 8:59 AM        Patient seen and examined independently by me.  Above discussed and I agree with medical student note except where indicated in the EMR revision history. Also see my additional comments and changes indicated by discrete font, text color, italics, and/or initials. Labs, cultures, and radiographs where available were reviewed. Initially, we are planning bedside thoracentesis but his abdominal distention appears to be worse and he is in a lot of discomfort. We will send him for stat CT of the abdomen pelvis to make sure his bleeding has not gotten worse and the rectus hematoma has not gotten significantly worse. No role for interventional radiology to drain his rectus hematoma I was told. He also needs to be put on a daily stool softener. Awaiting results of echocardiogram continue to hold Plavix/aspirin and Toradol. We will make ICU status.   Discussed with patient's wife patient at bedside as well as medicine team  Electronically signed by Antony Mckeon MD on 4/18/2022 at 11:09 AM

## 2022-04-18 NOTE — PROCEDURES
Thoracentesis Procedure Note    Pre-operative Diagnosis: Pleural Effusion, right     Post-operative Diagnosis: Same    Indications: Diagnostic and therapeutic  Operators: Dr. Stephy Omer and Angie Leach OMS3  Procedure Details:  Informed consent was obtained. The risks of the procedure were discussed including: infection, bleeding, pain, pneumothorax and failure to remove fluid at all. Time out was done in the usual fashion. Under sterile conditions the patient was properly positioned. A standard kit was used with Chorhexidine solution and sterile drapes. The entry site had been previously identified by ultrasound and marked. 1% Lidocaine was used to anesthetize the rib space at the entry site. A pleural catheter  was inserted into the right pleural space. Fluid was removed through the catheter connected to a vacutainer bottle without  difficulty. After the fluid was drained to completion, the catheter was removed and pressure held over the entry site. A Band-aid was applied to the wound and the procedure completed. CXR was ordered at the end of procedure. Findings: 200 ml of bloody serosanguineous pleural fluid was removed from the right  chest.     Complications:  None; patient tolerated the procedure well. Condition: Stable    Plan: Fluid will be sent to the lab for the following analysis:   LDH, Protein, Glucose, Cell count, Differential, Cytology, as well as for infection analysis (Gram stain, Bacterial culture). Serum for LDH and Total Protein will be ordered for Light's Criteria calculations. We will also send it for flow cytometry. A follow up chest x-ray was ordered. Attending Attestation: I was present and perfromed the entire procedure.     Electronically signed by Matthew Menchaca MD on 4/18/2022 at 3:31 PM

## 2022-04-19 ENCOUNTER — APPOINTMENT (OUTPATIENT)
Dept: GENERAL RADIOLOGY | Age: 62
DRG: 871 | End: 2022-04-19
Payer: COMMERCIAL

## 2022-04-19 PROBLEM — J96.01 ACUTE RESPIRATORY FAILURE WITH HYPOXIA (HCC): Status: ACTIVE | Noted: 2022-04-19

## 2022-04-19 LAB
ABSOLUTE EOS #: 0.2 K/UL (ref 0–0.4)
ABSOLUTE LYMPH #: 1.4 K/UL (ref 1–4.8)
ABSOLUTE MONO #: 1.1 K/UL (ref 0.1–1.3)
ANION GAP SERPL CALCULATED.3IONS-SCNC: 8 MMOL/L (ref 9–17)
BASO FLUID: ABNORMAL %
BASOPHILS # BLD: 1 % (ref 0–2)
BASOPHILS ABSOLUTE: 0.1 K/UL (ref 0–0.2)
BUN BLDV-MCNC: 15 MG/DL (ref 8–23)
CALCIUM SERPL-MCNC: 8.1 MG/DL (ref 8.6–10.4)
CHLORIDE BLD-SCNC: 101 MMOL/L (ref 98–107)
CO2: 27 MMOL/L (ref 20–31)
CREAT SERPL-MCNC: 0.66 MG/DL (ref 0.7–1.2)
EOSINOPHIL FLUID: 2 %
EOSINOPHILS RELATIVE PERCENT: 2 % (ref 0–4)
FLUID DIFF COMMENT: ABNORMAL
GFR AFRICAN AMERICAN: >60 ML/MIN
GFR NON-AFRICAN AMERICAN: >60 ML/MIN
GFR SERPL CREATININE-BSD FRML MDRD: ABNORMAL ML/MIN/{1.73_M2}
GLUCOSE BLD-MCNC: 107 MG/DL (ref 70–99)
GLUCOSE, FLUID: 80 MG/DL
HCT VFR BLD CALC: 22.4 % (ref 41–53)
HCT VFR BLD CALC: 22.4 % (ref 41–53)
HEMOGLOBIN: 7.5 G/DL (ref 13.5–17.5)
HEMOGLOBIN: 7.6 G/DL (ref 13.5–17.5)
LACTATE DEHYDROGENASE, FLUID: 807 U/L
LEGIONELLA PNEUMOPHILIA AG, URINE: NEGATIVE
LYMPHOCYTES # BLD: 14 % (ref 24–44)
LYMPHOCYTES, BODY FLUID: 9 %
MCH RBC QN AUTO: 27.7 PG (ref 26–34)
MCHC RBC AUTO-ENTMCNC: 33.3 G/DL (ref 31–37)
MCV RBC AUTO: 83.2 FL (ref 80–100)
MONOCYTE, FLUID: ABNORMAL %
MONOCYTES # BLD: 11 % (ref 1–7)
NEUTROPHIL, FLUID: 80 %
OTHER CELLS FLUID: 9 %
PDW BLD-RTO: 13.5 % (ref 11.5–14.9)
PLATELET # BLD: 262 K/UL (ref 150–450)
PMV BLD AUTO: 7.1 FL (ref 6–12)
POTASSIUM SERPL-SCNC: 3.9 MMOL/L (ref 3.7–5.3)
RBC # BLD: 2.69 M/UL (ref 4.5–5.9)
SEG NEUTROPHILS: 72 % (ref 36–66)
SEGMENTED NEUTROPHILS ABSOLUTE COUNT: 7.3 K/UL (ref 1.3–9.1)
SODIUM BLD-SCNC: 136 MMOL/L (ref 135–144)
SOURCE: NORMAL
SPECIMEN TYPE: NORMAL
STREP PNEUMONIAE ANTIGEN: NEGATIVE
SURGICAL PATHOLOGY REPORT: NORMAL
TOTAL PROTEIN, BODY FLUID: 3.8 G/DL
WBC # BLD: 10 K/UL (ref 3.5–11)

## 2022-04-19 PROCEDURE — 87449 NOS EACH ORGANISM AG IA: CPT

## 2022-04-19 PROCEDURE — 85018 HEMOGLOBIN: CPT

## 2022-04-19 PROCEDURE — 6370000000 HC RX 637 (ALT 250 FOR IP): Performed by: STUDENT IN AN ORGANIZED HEALTH CARE EDUCATION/TRAINING PROGRAM

## 2022-04-19 PROCEDURE — 99233 SBSQ HOSP IP/OBS HIGH 50: CPT | Performed by: INTERNAL MEDICINE

## 2022-04-19 PROCEDURE — 6360000002 HC RX W HCPCS: Performed by: STUDENT IN AN ORGANIZED HEALTH CARE EDUCATION/TRAINING PROGRAM

## 2022-04-19 PROCEDURE — 6360000002 HC RX W HCPCS: Performed by: SURGERY

## 2022-04-19 PROCEDURE — 2580000003 HC RX 258

## 2022-04-19 PROCEDURE — 2060000000 HC ICU INTERMEDIATE R&B

## 2022-04-19 PROCEDURE — 80048 BASIC METABOLIC PNL TOTAL CA: CPT

## 2022-04-19 PROCEDURE — A4216 STERILE WATER/SALINE, 10 ML: HCPCS | Performed by: SURGERY

## 2022-04-19 PROCEDURE — 36415 COLL VENOUS BLD VENIPUNCTURE: CPT

## 2022-04-19 PROCEDURE — C9113 INJ PANTOPRAZOLE SODIUM, VIA: HCPCS | Performed by: SURGERY

## 2022-04-19 PROCEDURE — 85025 COMPLETE CBC W/AUTO DIFF WBC: CPT

## 2022-04-19 PROCEDURE — 6370000000 HC RX 637 (ALT 250 FOR IP): Performed by: INTERNAL MEDICINE

## 2022-04-19 PROCEDURE — 87899 AGENT NOS ASSAY W/OPTIC: CPT

## 2022-04-19 PROCEDURE — 6370000000 HC RX 637 (ALT 250 FOR IP)

## 2022-04-19 PROCEDURE — 2580000003 HC RX 258: Performed by: SURGERY

## 2022-04-19 PROCEDURE — 71045 X-RAY EXAM CHEST 1 VIEW: CPT

## 2022-04-19 PROCEDURE — 85014 HEMATOCRIT: CPT

## 2022-04-19 PROCEDURE — 2580000003 HC RX 258: Performed by: STUDENT IN AN ORGANIZED HEALTH CARE EDUCATION/TRAINING PROGRAM

## 2022-04-19 RX ORDER — PANTOPRAZOLE SODIUM 40 MG/1
40 TABLET, DELAYED RELEASE ORAL
Status: DISCONTINUED | OUTPATIENT
Start: 2022-04-20 | End: 2022-04-21 | Stop reason: HOSPADM

## 2022-04-19 RX ADMIN — Medication 5 ML: at 02:53

## 2022-04-19 RX ADMIN — BENZONATATE 200 MG: 200 CAPSULE ORAL at 08:36

## 2022-04-19 RX ADMIN — SODIUM CHLORIDE 40 MG: 9 INJECTION, SOLUTION INTRAMUSCULAR; INTRAVENOUS; SUBCUTANEOUS at 08:37

## 2022-04-19 RX ADMIN — FENTANYL CITRATE 50 MCG: 50 INJECTION INTRAMUSCULAR; INTRAVENOUS at 23:01

## 2022-04-19 RX ADMIN — FUROSEMIDE 20 MG: 20 TABLET ORAL at 08:35

## 2022-04-19 RX ADMIN — Medication 60 MG: at 21:07

## 2022-04-19 RX ADMIN — FENTANYL CITRATE 50 MCG: 50 INJECTION INTRAMUSCULAR; INTRAVENOUS at 21:05

## 2022-04-19 RX ADMIN — Medication 60 MG: at 08:36

## 2022-04-19 RX ADMIN — BENZONATATE 200 MG: 200 CAPSULE ORAL at 21:07

## 2022-04-19 RX ADMIN — SODIUM CHLORIDE, PRESERVATIVE FREE 10 ML: 5 INJECTION INTRAVENOUS at 08:37

## 2022-04-19 RX ADMIN — BENZONATATE 200 MG: 200 CAPSULE ORAL at 15:25

## 2022-04-19 RX ADMIN — FENTANYL CITRATE 50 MCG: 50 INJECTION INTRAMUSCULAR; INTRAVENOUS at 16:35

## 2022-04-19 RX ADMIN — SODIUM CHLORIDE: 9 INJECTION, SOLUTION INTRAVENOUS at 15:37

## 2022-04-19 RX ADMIN — METOPROLOL TARTRATE 25 MG: 25 TABLET, FILM COATED ORAL at 08:35

## 2022-04-19 RX ADMIN — FENTANYL CITRATE 50 MCG: 50 INJECTION INTRAMUSCULAR; INTRAVENOUS at 02:53

## 2022-04-19 RX ADMIN — LOSARTAN POTASSIUM 50 MG: 50 TABLET, FILM COATED ORAL at 08:35

## 2022-04-19 RX ADMIN — METOPROLOL TARTRATE 25 MG: 25 TABLET, FILM COATED ORAL at 21:07

## 2022-04-19 RX ADMIN — FENTANYL CITRATE 50 MCG: 50 INJECTION INTRAMUSCULAR; INTRAVENOUS at 12:57

## 2022-04-19 RX ADMIN — FENTANYL CITRATE 50 MCG: 50 INJECTION INTRAMUSCULAR; INTRAVENOUS at 05:38

## 2022-04-19 RX ADMIN — FERROUS SULFATE TAB 325 MG (65 MG ELEMENTAL FE) 325 MG: 325 (65 FE) TAB at 08:36

## 2022-04-19 ASSESSMENT — PAIN SCALES - WONG BAKER
WONGBAKER_NUMERICALRESPONSE: 0

## 2022-04-19 ASSESSMENT — ENCOUNTER SYMPTOMS
CONSTIPATION: 0
NAUSEA: 0
BLOOD IN STOOL: 0
EYES NEGATIVE: 1
COUGH: 1
VOMITING: 0
DIARRHEA: 1
ABDOMINAL DISTENTION: 1
WHEEZING: 0
COLOR CHANGE: 1
SHORTNESS OF BREATH: 0
ABDOMINAL PAIN: 1
RHINORRHEA: 1
ALLERGIC/IMMUNOLOGIC NEGATIVE: 1

## 2022-04-19 ASSESSMENT — PAIN DESCRIPTION - PAIN TYPE
TYPE: ACUTE PAIN

## 2022-04-19 ASSESSMENT — PAIN DESCRIPTION - LOCATION
LOCATION: ABDOMEN

## 2022-04-19 ASSESSMENT — PAIN SCALES - GENERAL
PAINLEVEL_OUTOF10: 2
PAINLEVEL_OUTOF10: 9
PAINLEVEL_OUTOF10: 7
PAINLEVEL_OUTOF10: 9
PAINLEVEL_OUTOF10: 8
PAINLEVEL_OUTOF10: 0
PAINLEVEL_OUTOF10: 7
PAINLEVEL_OUTOF10: 0
PAINLEVEL_OUTOF10: 8
PAINLEVEL_OUTOF10: 8
PAINLEVEL_OUTOF10: 0
PAINLEVEL_OUTOF10: 8

## 2022-04-19 ASSESSMENT — PAIN DESCRIPTION - DESCRIPTORS
DESCRIPTORS: ACHING
DESCRIPTORS: ACHING

## 2022-04-19 ASSESSMENT — PAIN DESCRIPTION - FREQUENCY: FREQUENCY: INTERMITTENT

## 2022-04-19 ASSESSMENT — PAIN DESCRIPTION - ORIENTATION
ORIENTATION: RIGHT
ORIENTATION: RIGHT

## 2022-04-19 NOTE — CARE COORDINATION
ONGOING DISCHARGE PLAN:    Patient is alert and oriented x4. Spoke with patient regarding discharge plan and patient confirms that plan is still home with spouse with no VNS needed. Following for home o2 needs. 95% on 3 lpm NC.     PO Lasix 20 mg daily. PT/OT eval.     Transfer to PCU today. Will continue to follow for additional discharge needs.     Electronically signed by Fatou Mccurdy RN on 4/19/2022 at 1:47 PM

## 2022-04-19 NOTE — PROGRESS NOTES
ICU Progress Note (Non-Vent)  [facility]    Patient - Brett Angel,  Age - 59 y.o.    - 1960      Room Number -    N -  [id number]   [facility]  Date of Admission -  2022  5:31 PM    Events of Past 24 Hours   Pt had diagnostic/therapeutic thoracentesis yesterday with significant reduction in the pleural effusion. State his symptoms are improved however, he still feels uncomfortable. He has significant pain with every cough. He has yellow productive sputum. He is making several water bowel movements. He slept well through the night. Nursing staff increased his O2 to 3L nsl as pt was sating low however, was also not wearing it at that time. Vitals    height is 6' 1\" (1.854 m) and weight is 299 lb 13.2 oz (136 kg). His axillary temperature is 98.6 °F (37 °C). His blood pressure is 135/81 and his pulse is 93. His respiration is 18 and oxygen saturation is 78% (abnormal).        Temperature Range: Temp: 98.6 °F (37 °C) Temp  Av.8 °F (37.1 °C)  Min: 97.8 °F (36.6 °C)  Max: 100 °F (37.8 °C)  BP Range:  Systolic (32GQI), SCX:914 , Min:107 , CMV:676     Diastolic (29NGF), LYH:26, Min:65, Max:93    Pulse Range: Pulse  Av.6  Min: 88  Max: 116  Respiration Range: Resp  Av.3  Min: 14  Max: 24  Current Pulse Ox[de-identified]  SpO2: (!) 78 %  24HR Pulse Ox Range:  SpO2  Av.5 %  Min: 78 %  Max: 98 %  Oxygen Amount and Delivery: O2 Flow Rate (L/min): 2 L/min    Wt Readings from Last 3 Encounters:   22 299 lb 13.2 oz (136 kg)   04/15/22 292 lb (132.5 kg)   22 287 lb 3.2 oz (130.3 kg)     I/O       Intake/Output Summary (Last 24 hours) at 2022 0810  Last data filed at 2022 0400  Gross per 24 hour   Intake 940 ml   Output 1075 ml   Net -135 ml     DRAIN/TUBE OUTPUT       Invasive Lines   ICP PRESSURE RANGE  No data recorded  CVP PRESSURE RANGE  No data recorded      Medications      sennosides-docusate sodium  2 tablet Oral BID    furosemide  20 mg Oral Daily    benzonatate  200 mg Oral TID    dextromethorphan  60 mg Oral 2 times per day    ferrous sulfate  325 mg Oral Every Other Day    metoprolol tartrate  25 mg Oral BID    losartan  50 mg Oral Daily    sodium chloride flush  5-40 mL IntraVENous 2 times per day    pantoprazole (PROTONIX) 40 mg injection  40 mg IntraVENous Daily     HYDROcodone-homatropine, oxyCODONE-acetaminophen, sodium chloride flush, sodium chloride flush, sodium chloride, ondansetron **OR** ondansetron, fentanNYL, acetaminophen  IV Drips/Infusions   sodium chloride      sodium chloride 50 mL/hr at 04/18/22 2103       Diet/Nutrition   ADULT DIET; Dysphagia - Soft and Bite Sized    Exam      Constitutional - Alert, arousable  General Appearance  well developed, well nourished morbidly obese  HEENT -normocephalic, atraumatic. PERRLA. EOM intact abnormal upper airway  Lungs - Chest expands equally, no wheezes, rales or rhonchi. Cardiovascular - Heart sounds are normal.  normal rate and rhythm regular, no murmur, gallop or rub. Abdomen - soft, significant tenderness over bruising. Increased tenderness closer to bruise. Full appearing abdomen  Neurologic - CN II-XII are grossly intact. There are no focal motor deficits  Skin - Significant bruising to R side that has continued to expand in medial and inferior direction. Baseball size bruise to epigastric area that has not expanded overnight.  No bleeding  Extremities - no cyanosis, clubbing or edema    Lab Results   CBC     Lab Results   Component Value Date    WBC 10.0 04/19/2022    RBC 2.69 04/19/2022    HGB 7.5 04/19/2022    HCT 22.4 04/19/2022     04/19/2022    MCV 83.2 04/19/2022    MCH 27.7 04/19/2022    MCHC 33.3 04/19/2022    RDW 13.5 04/19/2022    LYMPHOPCT 14 04/19/2022    MONOPCT 11 04/19/2022    BASOPCT 1 04/19/2022    MONOSABS 1.10 04/19/2022    LYMPHSABS 1.40 04/19/2022    EOSABS 0.20 04/19/2022    BASOSABS 0.10 04/19/2022    DIFFTYPE NOT REPORTED 04/19/2021       BMP   Lab Results   Component Value Date     04/19/2022    K 3.9 04/19/2022     04/19/2022    CO2 27 04/19/2022    BUN 15 04/19/2022    CREATININE 0.66 04/19/2022    GLUCOSE 107 04/19/2022       LFTS  Lab Results   Component Value Date    ALKPHOS 62 04/16/2022    ALT 17 04/16/2022    AST 11 04/16/2022    PROT 5.8 04/18/2022    BILITOT 0.47 04/16/2022    LABALBU 3.9 04/16/2022       ABG ABGs: No results found for: PHART, PO2ART, RST5CUK    Lab Results   Component Value Date    MODE NOT REPORTED 04/19/2021         INR  Recent Labs     04/16/22  1745 04/17/22  0421   PROTIME 13.8 15.2*   INR 1.1 1.2       APTT  Recent Labs     04/16/22 1745   APTT 31.8       Lactic Acid  No results found for: LACTA     BNP   No results for input(s): BNP in the last 72 hours. Cultures       Radiology     CXR   Significantly improved R pleural effusion   CT Scans  Rectus sheath hematoma not increasing in size        (See actual reports for details)      SYSTEMS ASSESSMENT  Severe cough-most likely viral no obvious infiltrate seen on chest x-ray CT scan  Right pleural effusion - improved  Right rectus sheath hematoma  Hypotension-resolved  Acute kidney injury-improved  Coronary disease status post 6 stents  History of hypertension  Morbid obesity  Probable DELLA  Non-smoker  Full code    Neuro   No focal deficits    Respiratory   Wean oxygen as tolerated. Keep O2 sat > 88%  Improved pleural effusion after thoracentesis  Pleural fluid sent for flow cytometry - pending    Cardiovascular   No evidence of dissection, aneurysm on CT  Rectus sheath hematoma not increasing in size  Cardiology following.      Gastrointestinal   Pt had several bowel movements overnight - discontinue laxative    Renal   BUN and creatinine improved - within normal limits  Calcium low but improving    Infectious Disease   WBC now within normal

## 2022-04-19 NOTE — PROGRESS NOTES
Called Report to [facility] on PCU. All questions answered at this time. Patient transferred with all belongings. He spoke with his wife and updated her.

## 2022-04-19 NOTE — PROGRESS NOTES
[address]    PROGRESS NOTE             4/19/2022    7:15 AM    Name:   Michael Mcrae  MRN:     [id number]     Acct:      [de-identified]   Room:   2006/2006-01  IP Day:  3  Admit Date:  4/16/2022  5:31 PM    PCP:  Brooke Josue MD  Code Status:  Full Code    Subjective:     C/C:   Chief Complaint   Patient presents with    Cough     Temp max overnight 100. HR 91-95. Patient was seen and examined at bedside in ICU where he was moved yesterday. No acute events overnight. UOP 0.3 ml/kg/h. 2x BMs   Hgb 7.5-7.9   No hematoma evacuation planned  Dysphagia diet    Last 24 hours, 3x watery diarrhea. No oxygen at home. 2L here at hospital.   Pain at rest is 5 out of 10, 12 when coughing. Pain is in abdomen. Any movement exacerbates pain. Crampy pain. Coughing is present \"all the time. \" Cough meds help. No other issues reported. Brief History:     The patient is a 59 y.o. Non- / non  male who presents with Cough and he is admitted to the hospital for the management of right pleural effusion.     Patient reports that he was home for the last week, and had recently caught a cold from his wife, had a brief viral illness with cough that persisted. Patient reports that he then felt ill, and developed cough, which time he had coughing fits last week when he would cough strongly for hours. Patient reports that on Friday he did go to see his outpatient doctor in the office but then presented to the emergency department the following day for extreme chest pain, shortness of breath. He was found to be hypotensive in the emergency department and was bolused 2 L of fluid.   Met SIRS criteria 2 out of 4 with tachycardia, white blood cell count, and did have low blood pressure 61/51 on presentation.     Initial work-up for sepsis was performed, blood cultures drawn, fluid bolus given, maintenance fluids given, imaging showed large right pleural effusion, right rectus sheath hematoma.      Pro-Taz was slightly elevated, however given imaging, pulmonology does not feel strongly about any antibiotics at this time. Patient is from home and does not have any predisposing factors for Pseudomonas infection     Patient is saturating mid 90s on 2 L nasal cannula, does not use home oxygen. Possible history of obstructive sleep apnea, ported the patient had an home sleep apnea study which was negative.      Patient reports today he is doing fine, slightly better than yesterday, however reports he is still coughing quite a bit which causes him a lot of right-sided discomfort    Smoke quit 35 year ago  Drink alc socially    4/17: Plan today is possible bedside thoracentesis by pulmonology, IR has been consulted for possible hematoma evacuation. Discontinued antibiotics due to low suspicion for bacterial cause of pleural effusion. 4/18: CT abdomen done. Thoracentesis. Review of Systems:     Review of Systems   Constitutional: Negative for appetite change, chills, diaphoresis, fatigue and fever. HENT: Positive for rhinorrhea. Negative for congestion. Eyes: Negative. Respiratory: Positive for cough. Negative for shortness of breath and wheezing. Cardiovascular: Negative for chest pain (Right-sided rib area), palpitations and leg swelling. Gastrointestinal: Positive for abdominal distention, abdominal pain and diarrhea. Negative for blood in stool, constipation, nausea and vomiting. Endocrine: Negative. Genitourinary: Negative for dysuria and urgency. Musculoskeletal: Negative. Skin: Positive for color change (brusing right rib cage). Negative for rash and wound. Allergic/Immunologic: Negative. Neurological: Negative. Negative for dizziness and headaches. Hematological: Negative. Psychiatric/Behavioral: Negative. Negative for agitation and behavioral problems. Medications:      Allergies:  No Known Allergies    Current Meds:   Scheduled Meds:    sennosides-docusate sodium  2 tablet Oral BID    furosemide  20 mg Oral Daily    benzonatate  200 mg Oral TID    dextromethorphan  60 mg Oral 2 times per day    ferrous sulfate  325 mg Oral Every Other Day    metoprolol tartrate  25 mg Oral BID    losartan  50 mg Oral Daily    sodium chloride flush  5-40 mL IntraVENous 2 times per day    pantoprazole (PROTONIX) 40 mg injection  40 mg IntraVENous Daily     Continuous Infusions:    sodium chloride      sodium chloride 50 mL/hr at 22     PRN Meds: HYDROcodone-homatropine, oxyCODONE-acetaminophen, sodium chloride flush, sodium chloride flush, sodium chloride, ondansetron **OR** ondansetron, fentanNYL, acetaminophen    Data:     Past Medical History:   has a past medical history of CAD (coronary artery disease), Cerebral artery occlusion with cerebral infarction ([facility] [address] CHF (congestive heart failure) ([facility] [address] Hx of heart artery stent, Hyperlipidemia, Hypertension, and [facility] Social History:   reports that he quit smoking about 35 years ago. His smoking use included cigarettes. He started smoking about 47 years ago. He has a 30.00 pack-year smoking history. He has never used smokeless tobacco. He reports current alcohol use. He reports that he does not use drugs. Family History:   Family History   Problem Relation Age of Onset    Stroke Mother     Heart Disease Mother     Heart Disease Father     Diabetes Father     Stroke Father        Vitals:  /81   Pulse 93   Temp 98.6 °F (37 °C) (Axillary)   Resp 18   Ht 6' 1\" (1.854 m)   Wt 299 lb 13.2 oz (136 kg)   SpO2 (!) 78%   BMI 39.56 kg/m²   Temp (24hrs), Av.7 °F (37.1 °C), Min:97.8 °F (36.6 °C), Max:100 °F (37.8 °C)    No results for input(s): POCGLU in the last 72 hours. I/O(24Hr):     Intake/Output Summary (Last 24 hours) at 2022 0715  Last data filed at 2022 0400  Gross per 24 hour   Intake 940 ml   Output 1075 ml   Net -135 ml       Labs:  [unfilled]    No results found for: SPECIAL  Lab Results   Component Value Date/Time    CULTURE PENDING 04/18/2022 03:30 PM       [unfilled]    Radiology:    XR CHEST PORTABLE    Result Date: 4/16/2022  EXAMINATION: ONE XRAY VIEW OF THE CHEST 4/16/2022 2:49 pm COMPARISON: March 14 2022 HISTORY: ORDERING SYSTEM PROVIDED HISTORY: hypotension, SOB TECHNOLOGIST PROVIDED HISTORY: hypotension, SOB Reason for Exam: hypotension, SOB FINDINGS: Marginal inspiration is noted. A large right pleural effusion is noted, left basilar opacification, likely due to a combination of effusion, atelectasis and or consolidation. Cardiomegaly is noted. Vascular markings are distinct. No pneumothorax noted. No acute osseous abnormalities present. 1. Large right pleural effusion 2. Right basilar opacification, which may be due to a combination of atelectasis, effusion and or consolidation. CTA CHEST ABDOMEN PELVIS W CONTRAST    Result Date: 4/16/2022  EXAMINATION: CTA OF THE CHEST, ABDOMEN AND PELVIS WITH CONTRAST 4/16/2022 3:30 pm: TECHNIQUE: CTA of the chest, abdomen and pelvis was performed after the administration of intravenous contrast.  Multiplanar reformatted images are provided for review. MIP images are provided for review. Dose modulation, iterative reconstruction, and/or weight based adjustment of the mA/kV was utilized to reduce the radiation dose to as low as reasonably achievable.  COMPARISON: Chest x-ray dated April 16, 2022 HISTORY: ORDERING SYSTEM PROVIDED HISTORY: hypotension, Chest pain, r/o disection TECHNOLOGIST PROVIDED HISTORY: hypotension, Chest pain, r/o disection Decision Support Exception - unselect if not a suspected or confirmed emergency medical condition->Emergency Medical Condition (MA) Reason for Exam: hypotension, Chest pain, r/o disection Relevant Medical/Surgical History: chf, stents, cardiac cath, appendectomy FINDINGS: CTA CHEST: Thoracic aorta: No evidence of thoracic aortic aneurysm or dissection. No acute abnormality of the aorta. Mediastinum: No adenopathy is present. Normal enhancement of the pulmonary arterial system is present. Extensive coronary arterial calcifications are noted. Small pericardial effusion is present. Fatty changes are present involving the apex of the left ventricle, related to prior infarct. Lungs/Pleura: A large right pleural effusion is present, with compressive atelectasis involving the right lower lobe. Superimposed infection is difficult to exclude. The trachea and mainstem bronchi appear normal. Paraseptal emphysema is present within the right apex. Minimal patchy airspace disease is present within the left upper lobe, likely infectious in etiology. Soft Tissues/Bones: No acute osseous abnormality is present involving the thorax. Asymmetric enlargement of the right serratus anterior muscle is present, compared to the left. This may represent intramuscular hematoma or inflammation/myositis. CTA ABDOMEN: Abdominal aorta/Branches: Normal enhancement of the abdominal aorta and branch vessels is present without evidence of aneurysm formation or dissection. Organs: Hepatic steatosis is present. The gallbladder, pancreas, spleen, adrenal glands, and kidneys demonstrate no acute abnormality. GI/Bowel: Stomach appears normal.  Small bowel appears normal without evidence of obstruction. No evidence of appendicitis is present. Scattered colonic diverticula, without evidence of diverticulitis. Peritoneum/Retroperitoneum: No adenopathy is present. No free fluid is present within the abdomen. No free air is noted. Bones/Soft Tissues: No acute osseous abnormality is present involving the abdomen. Marked enlargement of the right rectus sheath and rectus abdominus muscle is present, consistent with a large rectus sheath hematoma.   Hematoma extends superiorly, along the anterolateral right chest wall, likely involving the serrated anterior muscle is well. Right rectus sheath hematoma measures approximately 15-16 cm in transverse dimension, 4-5 cm in AP dimension, and approximately 16 cm in cephalocaudal dimension. No evidence of active bleeding is present. Soft tissue stranding is present throughout the subcutaneous fat of the right flank and anterior abdominal wall. CTA PELVIS: Aorta/Iliacs: Normal enhancement of the aortic bifurcation, common iliac, internal and external iliac arteries common femoral arteries is noted bilaterally without evidence of aneurysm formation or dissection. Other: Urinary bladder is nondistended. Prostate gland is normal size. No free fluid or free air is present within the pelvis. Bones/Soft Tissues: No acute osseous abnormality is present involving the pelvis. A small umbilical hernia is present containing fat. 1. No evidence of dissection, aneurysm formation, or intramural hematoma formation involving the thoracic or abdominal aorta 2. Large right pleural effusion, with atelectasis involving the right lower lobe. Superimposed infection is difficult to exclude 3. Large right rectus sheath hematoma, measuring approximately 15-16 cm in transverse dimension, 4-5 cm in AP dimension, and approximately 16 cm in cephalocaudal dimension 4. Asymmetric enlargement of the right serratus anterior muscle alyce may related to intramuscular hematoma formation, or myositis. 5.  Critical results were called by Dr. Remy Figueredo to Dr. Herson Chaparro on 4/16/2022 at 7:11 p.m. hours. Physical Examination:        Physical Exam  Vitals and nursing note reviewed. Constitutional:       General: He is not in acute distress. Appearance: He is not ill-appearing, toxic-appearing or diaphoretic. HENT:      Head: Normocephalic and atraumatic. Nose: No congestion. Mouth/Throat:      Mouth: Mucous membranes are moist.   Eyes:      General: No scleral icterus.      Extraocular Movements: Extraocular movements intact. Cardiovascular:      Rate and Rhythm: Regular rhythm. Tachycardia present. Heart sounds: No murmur heard. Pulmonary:      Effort: Respiratory distress (on 2L oxygen) present. Breath sounds: No wheezing, rhonchi or rales. Abdominal:      General: Bowel sounds are normal. There is distension. Tenderness: There is abdominal tenderness. There is no guarding. Musculoskeletal:         General: Tenderness (right flank area over hematoma) present. Right lower leg: No edema. Left lower leg: No edema. Skin:     General: Skin is warm and dry. Findings: Bruising (Right rib cage, increased in size visually) present. Neurological:      General: No focal deficit present. Mental Status: He is alert.    Psychiatric:         Mood and Affect: Mood normal.         Behavior: Behavior normal.       Assessment:        Primary Problem  Pleural effusion    Active Hospital Problems    Diagnosis Date Noted    Iron deficiency anemia [D50.9] 04/18/2022    Acute blood loss anemia [D62] 04/18/2022    Rectus sheath hematoma [S30.1XXA] 04/18/2022    Primary hypertension [I10] 04/17/2022    Pleural effusion [J90] 04/16/2022     Plan:        Neuro/ Psych  -No focal deficits     Endocrine  -A1C 5.6  -Glucose 107    Cardiovascular  -Hx of multiple stents  -HR 91-95  -Keep MAP above 65  -Furosemide 20mg daily  -Losartan 50mg daily  -Metoprolol 25mg bid  -Echo EF 55%  -ASA, plavix held due to hematoma     Pulm / Resp  -Right-sided pleural effusion  -Acute respiratory failure, on oxygen at night  -Pulm on board  -Possible DELLA, outpatient sleep study  -4/18 Thoracentesis: pH body fluid 8, , glucose 80, protein 3.8, total , total protein 5.8= exudative   -Body fluid cx: pending    Gastrointestinal  -Dysphagia diet  -pantoprazole 40mg daily      Genitourinary  -UOP 0.4 ml/kg/hr > 0.3 ml/kg/hr  -Sellers removed  -ISA, Cr 1.86 > 0.66, resolved  -CT: no change in hematoma size, 13mm lesion in left upper kidney    Hematologic  -Hgb 10.7 >> 7.5, rectal sheath hematoma  -Plt 262  -hold dvt ppx  -Iron 23, TIBC 201, Iron sat 11     Infectious Disease  -WBC 21 > 10  -Lactate 2.8 > 2.4  -Procal 0.11  -Abx d/c   -Urine cx showing as canceled  -Blood cx NGTD  -Strep pneumo, legionella ordered  -Viral respiratory panel negative  -MRSA negative nasal     FEN  -Na 136, K 3.9  -50 ml/h NS     IVF: NS 50ml/hr   Diet: dysphagia   GI ppx: pantoprazole 40mg daily   DVT ppx: held due to hematoma, SCDs ordered  Code status: Full code  Consults: gen surg, crit care, cardiology  Dispo: pending clinical course    Jane Donaldson MD  4/19/2022  7:15 AM     Attending Physician Statement  I have discussed the care of [address] and I have examined the patient myselft and taken ros and hpi , including pertinent history and exam findings,  with the resident. I have reviewed the key elements of all parts of the encounter with the resident. I agree with the assessment, plan and orders as documented by the resident.     Severe sepsis with right pleural effusion,  Severe cough,  Acute respiratory distress,  Acute respiratory failure on oxygen  Viral respiratory panel negative,  Systolic heart failure, EF of 50% in 2021, repeat echo,  Acute blood loss anemia,  Acute kidney injury, getting better,  Coronary disease with multiple cardiac stents in the past,  Obstructive sleep apnea, obesity hypoventilation syndrome,  Prognosis guarded,  Critical care time spent 36 minutes  Right rectus sheath hematoma  Get cardio on board fpr asa, plavix, recent PCI        Electronically signed by Ambika Arana MD

## 2022-04-19 NOTE — PROGRESS NOTES
Patient was seen and examined. Awake alert in no acute distress. No shortness of breath. Afebrile vital signs are stable. Urine output is adequate. Tolerating diet. Had a bowel movement. Abdomen is soft. Bruising in the right flank right lateral aspect increased which is not unexpected. Extremity mild edema. Blood work reviewed. Sodium potassium creatinine normal.  WBC count is normal.  Hemoglobin is 7.5 which is relatively stable. Okay to transfer out of the ICU from my standpoint. Change hemoglobin to every 8 hours. Continue soft diet. No surgical intervention. Continue medical management. Continue to hold blood thinners.

## 2022-04-19 NOTE — PLAN OF CARE
Problem: Pain:  Goal: Pain level will decrease  Description: Pain level will decrease  4/19/2022 1625 by Agustin Torres RN  Outcome: Ongoing  4/19/2022 0310 by Nena Meraz RN  Outcome: Ongoing  Note: Fentanyl given prn this shift, pt states of good pain relief with dose  Goal: Control of acute pain  Description: Control of acute pain  Outcome: Ongoing  Goal: Control of chronic pain  Description: Control of chronic pain  Outcome: Ongoing     Problem: Falls - Risk of:  Goal: Will remain free from falls  Description: Will remain free from falls  4/19/2022 1625 by Agustin Torres RN  Outcome: Ongoing  4/19/2022 0310 by Nena Meraz RN  Outcome: Met This Shift  Goal: Absence of physical injury  Description: Absence of physical injury  Outcome: Ongoing     Problem: Discharge Planning:  Goal: Discharged to appropriate level of care  Description: Discharged to appropriate level of care  Outcome: Ongoing  Goal: Participates in care planning  Description: Participates in care planning  Outcome: Ongoing     Problem: Gas Exchange - Impaired:  Goal: Levels of oxygenation will improve  Description: Levels of oxygenation will improve  4/19/2022 1625 by Agustin Torres RN  Outcome: Ongoing  4/19/2022 0310 by Nena Meraz RN  Outcome: Ongoing  Note: CXR results noted, continuous pulse ox monitored, NC oxygen continues due to pt SOB with exertion, cough medications given as ordered     Problem: Infection, Septic Shock:  Goal: Will show no infection signs and symptoms  Description: Will show no infection signs and symptoms  4/19/2022 1625 by Agustin Torres RN  Outcome: Ongoing  4/19/2022 0310 by Nena Meraz RN  Outcome: Ongoing  Note: Pt with temp 100 max this shift, continue to monitor     Problem: Skin Integrity:  Goal: Will show no infection signs and symptoms  Description: Will show no infection signs and symptoms  4/19/2022 1625 by Agustin Trores RN  Outcome: Ongoing  4/19/2022 0310 by Nena Meraz RN  Outcome:  Met This Shift  Goal: Absence of new skin breakdown  Description: Absence of new skin breakdown  Outcome: Ongoing

## 2022-04-19 NOTE — FLOWSHEET NOTE
04/19/22 1441   Encounter Summary   Services provided to: Patient   Referral/Consult From: Rounding   Complexity of Encounter Low   Length of Encounter 15 minutes   Spiritual/Uatsdin   Type Spiritual support   Assessment Sleeping   Intervention Prayer

## 2022-04-19 NOTE — PLAN OF CARE
Problem: Falls - Risk of:  Goal: Will remain free from falls  Description: Will remain free from falls  Outcome: Met This Shift     Problem: Skin Integrity:  Goal: Will show no infection signs and symptoms  Description: Will show no infection signs and symptoms  Outcome: Met This Shift     Problem: Pain:  Goal: Pain level will decrease  Description: Pain level will decrease  Outcome: Ongoing  Note: Fentanyl given prn this shift, pt states of good pain relief with dose     Problem: Gas Exchange - Impaired:  Goal: Levels of oxygenation will improve  Description: Levels of oxygenation will improve  Outcome: Ongoing  Note: CXR results noted, continuous pulse ox monitored, NC oxygen continues due to pt SOB with exertion, cough medications given as ordered     Problem: Infection, Septic Shock:  Goal: Will show no infection signs and symptoms  Description: Will show no infection signs and symptoms  Outcome: Ongoing  Note: Pt with temp 100 max this shift, continue to monitor     Problem: Infection, Septic Shock:  Goal: Will show no infection signs and symptoms  Description: Will show no infection signs and symptoms  Outcome: Ongoing  Note: Pt with temp 100 max this shift, continue to monitor

## 2022-04-19 NOTE — PROGRESS NOTES
Dr Christiano Steinberg at bedside, aware of HGB trend, hematoma size, OK to transfer to PCU once OK with other services, change HGB checks to every 8 hrs

## 2022-04-19 NOTE — PROGRESS NOTES
[facility]  Progress Note                   Date:   4/19/2022  Patient name: Mychal Grover  Date of admission:  4/16/2022  5:31 PM  MRN:   [id number]  YOB: 1960  PCP: Red Pham MD    Reason for Admission: Pleural effusion [J90]  ISA (acute kidney injury) ([facility] [N17.9]  Hematoma of rectus sheath, initial encounter [S30.1XXA]  Sepsis with acute renal failure and septic shock, due to unspecified organism, unspecified acute renal failure type ([address] [A41.9, R65.21, N17.9]    Subjective:       Clinical Changes /Abnormalities: Seen & examined alone in room. No acute CV issues/cocnerns overnight. Labs, vitals, & tele reviewed. Tele SR    Review of Systems    Medications:   Scheduled Meds:   [START ON 4/20/2022] pantoprazole  40 mg Oral QAM AC    sennosides-docusate sodium  2 tablet Oral BID    furosemide  20 mg Oral Daily    benzonatate  200 mg Oral TID    dextromethorphan  60 mg Oral 2 times per day    ferrous sulfate  325 mg Oral Every Other Day    metoprolol tartrate  25 mg Oral BID    losartan  50 mg Oral Daily    sodium chloride flush  5-40 mL IntraVENous 2 times per day     Continuous Infusions:   sodium chloride      sodium chloride 50 mL/hr at 04/18/22 2103     CBC:   Recent Labs     04/17/22  0421 04/17/22  1031 04/18/22  0351 04/18/22  1117 04/18/22  1540 04/18/22  2118 04/19/22  0501   WBC 13.5*  --  11.2*  --   --   --  10.0   HGB 8.3*   < > 7.7*   < > 7.9* 7.6* 7.5*     --  248  --   --   --  262    < > = values in this interval not displayed.      BMP:    Recent Labs     04/17/22  0421 04/18/22  0351 04/19/22  0501    132* 136   K 4.0 4.2 3.9    100 101   CO2 24 25 27   BUN 34* 22 15   CREATININE 1.27* 0.79 0.66*   GLUCOSE 139* 120* 107*     Hepatic:  Recent Labs     04/16/22  1745   AST 11   ALT 17   BILITOT 0.47   ALKPHOS 62     Troponin:   Recent Labs     04/16/22  1745 04/16/22  1910   TROPHS 16 16     BNP: No results for input(s): BNP in the last 72 hours. Lipids: No results for input(s): CHOL, HDL in the last 72 hours. Invalid input(s): LDLCALCU  INR:   Recent Labs     04/16/22  1745 04/17/22  0421   INR 1.1 1.2       Objective:   Vitals: /71   Pulse 86   Temp 98.4 °F (36.9 °C) (Oral)   Resp 18   Ht 6' 1\" (1.854 m)   Wt 299 lb 13.2 oz (136 kg)   SpO2 95%   BMI 39.56 kg/m²   General appearance: alert and cooperative with exam  HEENT: Head: Normocephalic, no lesions, without obvious abnormality. Neck:no JVD, trachea midline, no adenopathy  Lungs: Clear to auscultation  Heart: Regular rate and rhythm, s1/s2 auscultated, no murmurs, SR  Abdomen: soft, non-tender, bowel sounds active  Extremities: no edema  Neurologic: not done    Echo 4/20/2021  Summary  Left ventricle is normal in size with systolic function preserved within the  range of normal.  Contrast agent ( Definity) was utilized to better visualize the left  ventricular apex. Apical and anteroapical hypokinesis was noted. No evidence of apical clot. Estimated ejection fraction is 50-55% . Left atrium is mildly dilated. Mild mitral regurgitation. Mild tricuspid regurgitation. Estimated right ventricular systolic pressure  is 27 mmHg. Echo 4/18/22  Summary  Technically difficult study  Left ventricle is normal in size. Mild left ventricular hypertrophy. Normal left ventricular systolic function. Estimated LV EF 55 %. Mild Apical  hypokinesis. Left atrium is normal in size. Normal right ventricular size and function. No significant valvular regurgitation or stenosis seen. No significant pericardial effusion is seen. Aortic root dimension is at upper limit of normal.  IVC not well visualized    Assessment / Acute Cardiac Problems:   1. Septic shock  2. Pneumonia with right pleural effusion  3. Right rectus sheath hematoma  4. ISA  5. Acute blood loss Anemia  6. Hx of CAD with PTCA/LORAINE to RCA x2 9/2021  7. ICMP with improved LVEF on recent echo to 50-55%  8. HTN  9. HLP    Patient Active Problem List:     Cerebrovascular accident (CVA) ([address]     Hypertensive urgency     TIA (transient ischemic attack)     CAD in native artery     Expressive aphasia     Transient ischemic attack     History of TIA (transient ischemic attack)     DELLA (obstructive sleep apnea)     Pleural effusion     Primary hypertension     Iron deficiency anemia     Acute blood loss anemia     Rectus sheath hematoma     Acute respiratory failure with hypoxia ([address]      Plan of Treatment:   1. Stable from CV standpoint. Echo from 4/18 reviewed  2. Per notes, no plans for surgical intervention for hematoma. 3. Cardiology will sign off. Please call with further questions/concerns.      Electronically signed by NICKI Wallis CNP on 4/19/2022 at 2:02 PM  [address]  [phone]

## 2022-04-19 NOTE — PROGRESS NOTES
Rafa Peraza NP notified of pt temp of 100, events of pt hospital stay, will continue to monitor temperature and repeat CXR in AM

## 2022-04-20 LAB
ABO/RH: NORMAL
ABSOLUTE EOS #: 0.3 K/UL (ref 0–0.4)
ABSOLUTE LYMPH #: 1.7 K/UL (ref 1–4.8)
ABSOLUTE MONO #: 1 K/UL (ref 0.1–1.3)
ANION GAP SERPL CALCULATED.3IONS-SCNC: 8 MMOL/L (ref 9–17)
ANTIBODY SCREEN: NEGATIVE
ARM BAND NUMBER: NORMAL
BASOPHILS # BLD: 0 % (ref 0–2)
BASOPHILS ABSOLUTE: 0 K/UL (ref 0–0.2)
BLD PROD TYP BPU: NORMAL
BLOOD BANK COMMENT: NORMAL
BPU ID: NORMAL
BUN BLDV-MCNC: 14 MG/DL (ref 8–23)
CALCIUM SERPL-MCNC: 7.8 MG/DL (ref 8.6–10.4)
CHLORIDE BLD-SCNC: 102 MMOL/L (ref 98–107)
CO2: 29 MMOL/L (ref 20–31)
CREAT SERPL-MCNC: 0.7 MG/DL (ref 0.7–1.2)
CROSSMATCH RESULT: NORMAL
DISPENSE STATUS BLOOD BANK: NORMAL
EOSINOPHILS RELATIVE PERCENT: 3 % (ref 0–4)
EXPIRATION DATE: NORMAL
FLOW CYTOMETRY SOURCE: NORMAL
FLOW CYTOMETRY, NODE/FLUID: NORMAL
GFR AFRICAN AMERICAN: >60 ML/MIN
GFR NON-AFRICAN AMERICAN: >60 ML/MIN
GFR SERPL CREATININE-BSD FRML MDRD: ABNORMAL ML/MIN/{1.73_M2}
GLUCOSE BLD-MCNC: 112 MG/DL (ref 70–99)
HCT VFR BLD CALC: 21.2 % (ref 41–53)
HCT VFR BLD CALC: 21.8 % (ref 41–53)
HCT VFR BLD CALC: 22.7 % (ref 41–53)
HEMOGLOBIN: 7.3 G/DL (ref 13.5–17.5)
HEMOGLOBIN: 7.5 G/DL (ref 13.5–17.5)
HEMOGLOBIN: 7.8 G/DL (ref 13.5–17.5)
LYMPHOCYTES # BLD: 17 % (ref 24–44)
MCH RBC QN AUTO: 27.9 PG (ref 26–34)
MCHC RBC AUTO-ENTMCNC: 33.6 G/DL (ref 31–37)
MCV RBC AUTO: 83.1 FL (ref 80–100)
MONOCYTES # BLD: 10 % (ref 1–7)
PDW BLD-RTO: 13.7 % (ref 11.5–14.9)
PLATELET # BLD: 319 K/UL (ref 150–450)
PMV BLD AUTO: 6.2 FL (ref 6–12)
POTASSIUM SERPL-SCNC: 3.6 MMOL/L (ref 3.7–5.3)
RBC # BLD: 2.63 M/UL (ref 4.5–5.9)
SEG NEUTROPHILS: 70 % (ref 36–66)
SEGMENTED NEUTROPHILS ABSOLUTE COUNT: 6.7 K/UL (ref 1.3–9.1)
SODIUM BLD-SCNC: 139 MMOL/L (ref 135–144)
SURGICAL PATHOLOGY REPORT: NORMAL
TRANSFUSION STATUS: NORMAL
UNIT DIVISION: 0
WBC # BLD: 9.7 K/UL (ref 3.5–11)

## 2022-04-20 PROCEDURE — 80048 BASIC METABOLIC PNL TOTAL CA: CPT

## 2022-04-20 PROCEDURE — 6360000002 HC RX W HCPCS

## 2022-04-20 PROCEDURE — 36415 COLL VENOUS BLD VENIPUNCTURE: CPT

## 2022-04-20 PROCEDURE — 85025 COMPLETE CBC W/AUTO DIFF WBC: CPT

## 2022-04-20 PROCEDURE — 85014 HEMATOCRIT: CPT

## 2022-04-20 PROCEDURE — 6370000000 HC RX 637 (ALT 250 FOR IP): Performed by: INTERNAL MEDICINE

## 2022-04-20 PROCEDURE — 6370000000 HC RX 637 (ALT 250 FOR IP): Performed by: STUDENT IN AN ORGANIZED HEALTH CARE EDUCATION/TRAINING PROGRAM

## 2022-04-20 PROCEDURE — 85018 HEMOGLOBIN: CPT

## 2022-04-20 PROCEDURE — 6370000000 HC RX 637 (ALT 250 FOR IP)

## 2022-04-20 PROCEDURE — 2060000000 HC ICU INTERMEDIATE R&B

## 2022-04-20 PROCEDURE — 97161 PT EVAL LOW COMPLEX 20 MIN: CPT

## 2022-04-20 PROCEDURE — 6360000002 HC RX W HCPCS: Performed by: STUDENT IN AN ORGANIZED HEALTH CARE EDUCATION/TRAINING PROGRAM

## 2022-04-20 PROCEDURE — 99232 SBSQ HOSP IP/OBS MODERATE 35: CPT | Performed by: INTERNAL MEDICINE

## 2022-04-20 PROCEDURE — 6370000000 HC RX 637 (ALT 250 FOR IP): Performed by: SURGERY

## 2022-04-20 RX ORDER — POTASSIUM CHLORIDE 7.45 MG/ML
10 INJECTION INTRAVENOUS
Status: COMPLETED | OUTPATIENT
Start: 2022-04-20 | End: 2022-04-20

## 2022-04-20 RX ADMIN — OXYCODONE HYDROCHLORIDE AND ACETAMINOPHEN 1 TABLET: 5; 325 TABLET ORAL at 09:13

## 2022-04-20 RX ADMIN — FENTANYL CITRATE 50 MCG: 50 INJECTION INTRAMUSCULAR; INTRAVENOUS at 15:19

## 2022-04-20 RX ADMIN — BENZONATATE 200 MG: 200 CAPSULE ORAL at 09:12

## 2022-04-20 RX ADMIN — FUROSEMIDE 20 MG: 20 TABLET ORAL at 09:12

## 2022-04-20 RX ADMIN — LOSARTAN POTASSIUM 50 MG: 50 TABLET, FILM COATED ORAL at 09:12

## 2022-04-20 RX ADMIN — FENTANYL CITRATE 50 MCG: 50 INJECTION INTRAMUSCULAR; INTRAVENOUS at 03:01

## 2022-04-20 RX ADMIN — POTASSIUM CHLORIDE 10 MEQ: 7.46 INJECTION, SOLUTION INTRAVENOUS at 10:52

## 2022-04-20 RX ADMIN — FENTANYL CITRATE 50 MCG: 50 INJECTION INTRAMUSCULAR; INTRAVENOUS at 19:23

## 2022-04-20 RX ADMIN — Medication 60 MG: at 09:11

## 2022-04-20 RX ADMIN — Medication 5 ML: at 19:18

## 2022-04-20 RX ADMIN — METOPROLOL TARTRATE 25 MG: 25 TABLET, FILM COATED ORAL at 21:46

## 2022-04-20 RX ADMIN — METOPROLOL TARTRATE 25 MG: 25 TABLET, FILM COATED ORAL at 09:12

## 2022-04-20 RX ADMIN — PANTOPRAZOLE SODIUM 40 MG: 40 TABLET, DELAYED RELEASE ORAL at 05:42

## 2022-04-20 RX ADMIN — FENTANYL CITRATE 50 MCG: 50 INJECTION INTRAMUSCULAR; INTRAVENOUS at 09:59

## 2022-04-20 RX ADMIN — BENZONATATE 200 MG: 200 CAPSULE ORAL at 21:46

## 2022-04-20 RX ADMIN — POTASSIUM CHLORIDE 10 MEQ: 7.46 INJECTION, SOLUTION INTRAVENOUS at 11:59

## 2022-04-20 RX ADMIN — Medication 60 MG: at 21:46

## 2022-04-20 RX ADMIN — FENTANYL CITRATE 50 MCG: 50 INJECTION INTRAMUSCULAR; INTRAVENOUS at 21:46

## 2022-04-20 RX ADMIN — POTASSIUM CHLORIDE 10 MEQ: 7.46 INJECTION, SOLUTION INTRAVENOUS at 13:26

## 2022-04-20 RX ADMIN — BENZONATATE 200 MG: 200 CAPSULE ORAL at 15:19

## 2022-04-20 RX ADMIN — POTASSIUM CHLORIDE 10 MEQ: 7.46 INJECTION, SOLUTION INTRAVENOUS at 09:16

## 2022-04-20 ASSESSMENT — PAIN DESCRIPTION - LOCATION
LOCATION: ABDOMEN
LOCATION: ABDOMEN

## 2022-04-20 ASSESSMENT — ENCOUNTER SYMPTOMS
COUGH: 0
STRIDOR: 0
NAUSEA: 0
ABDOMINAL PAIN: 1
CONSTIPATION: 0
APNEA: 0
VOMITING: 0
COLOR CHANGE: 1
RESPIRATORY NEGATIVE: 1
ABDOMINAL DISTENTION: 0
ALLERGIC/IMMUNOLOGIC NEGATIVE: 1
WHEEZING: 0
SHORTNESS OF BREATH: 0
DIARRHEA: 1
CHEST TIGHTNESS: 0
CHOKING: 0
EYES NEGATIVE: 1

## 2022-04-20 ASSESSMENT — PAIN SCALES - GENERAL
PAINLEVEL_OUTOF10: 7
PAINLEVEL_OUTOF10: 9
PAINLEVEL_OUTOF10: 8
PAINLEVEL_OUTOF10: 9
PAINLEVEL_OUTOF10: 8

## 2022-04-20 ASSESSMENT — PAIN DESCRIPTION - DESCRIPTORS
DESCRIPTORS: ACHING
DESCRIPTORS: ACHING

## 2022-04-20 ASSESSMENT — PAIN DESCRIPTION - ORIENTATION
ORIENTATION: RIGHT
ORIENTATION: RIGHT

## 2022-04-20 NOTE — PROGRESS NOTES
[facility]                 PATIENT NAME: Ghazala Ramirez     TODAY'S DATE: 4/20/2022, 10:29 AM    SUBJECTIVE:    Pt seen and examined. Afebrile, VSS. No leukocytosis, hemoglobin low but stable at 7.3. Patient states he had a big cough this morning and is now having right-sided abdominal pain around rectus sheath hematoma. Right flank ecchymosis stable. Bowels are moving. Tolerating diet, no N/V.      OBJECTIVE:   VITALS:  /86   Pulse 92   Temp 98.5 °F (36.9 °C) (Oral)   Resp 16   Ht 6' 1\" (1.854 m)   Wt 299 lb 13.2 oz (136 kg)   SpO2 94%   BMI 39.56 kg/m²      INTAKE/OUTPUT:      Intake/Output Summary (Last 24 hours) at 4/20/2022 1029  Last data filed at 4/20/2022 0718  Gross per 24 hour   Intake 1357.73 ml   Output 300 ml   Net 1057.73 ml                 CONSTITUTIONAL:  awake and alert. No acute distress  HEART:   RRR  LUNGS:   CTA  ABDOMEN:   Abdomen soft, right-sided abdomen/flank tender, non-distended  EXTREMITIES:   No pedal edema    Data:  CBC:   Lab Results   Component Value Date    WBC 9.7 04/20/2022    RBC 2.63 04/20/2022    HGB 7.3 04/20/2022    HCT 21.8 04/20/2022    MCV 83.1 04/20/2022    MCH 27.9 04/20/2022    MCHC 33.6 04/20/2022    RDW 13.7 04/20/2022     04/20/2022    MPV 6.2 04/20/2022     BMP:    Lab Results   Component Value Date     04/20/2022    K 3.6 04/20/2022     04/20/2022    CO2 29 04/20/2022    BUN 14 04/20/2022    LABALBU 3.9 04/16/2022    CREATININE 0.70 04/20/2022    CALCIUM 7.8 04/20/2022    GFRAA >60 04/20/2022    LABGLOM >60 04/20/2022    GLUCOSE 112 04/20/2022       Radiology Review:  No new images to review       ASSESSMENT     Principal Problem:    Pleural effusion  Active Problems:    Primary hypertension    Iron deficiency anemia    Acute blood loss anemia    Rectus sheath hematoma    Acute respiratory failure with hypoxia (HCC)  Resolved Problems:    ISA (acute kidney injury) ([facility] [address]      Plan  1.  Diet as

## 2022-04-20 NOTE — PROGRESS NOTES
Physical Therapy  Facility/Department: [facility] PROGRESSIVE CARE  Physical Therapy Initial Assessment    Name: Candelaria Sandoval  : 1960  MRN: [id number]  Date of Service: 2022    Discharge Recommendations:      PT Equipment Recommendations  Equipment Needed: No      Patient Diagnosis(es): The primary encounter diagnosis was Pleural effusion. Diagnoses of Sepsis with acute renal failure and septic shock, due to unspecified organism, unspecified acute renal failure type ([address] Hematoma of rectus sheath, initial encounter, and ISA (acute kidney injury) ([address] were also pertinent to this visit. Past Medical History:  has a past medical history of CAD (coronary artery disease), Cerebral artery occlusion with cerebral infarction ([facility] [address] CHF (congestive heart failure) ([facility] [address] Hx of heart artery stent, Hyperlipidemia, Hypertension, and MI (myocardial infarction) ([facility] [address] Past Surgical History:  has a past surgical history that includes Appendectomy; Tonsillectomy; Cardiac surgery; Cardiac catheterization (2021); Cardiac catheterization; Cardiac catheterization; and Thoracentesis (2022). Assessment   Assessment: Pt is able to mobilize independently but does c/o abdominal pain at this time resulting in dec ambulation speeds; supervision of mobility provided for purposes of monitoring vitals during mobility. Pt mobilizes while on 1L O2 NC and SpO2 sats remain at 95%. Pt temporarily weaned to room air while seated EOB and SpO2 is maintained at 94% while resting on room air. Pt placed back on 1L O2 as per set-up prior to arrival. Pt does not require continued PT services as he his independent with mobility and denies any needs for continued PT at this time.    Decision Making: Low Complexity  Exam: Functional mobility, strength, ROM, Balance assessments  No Skilled PT: Independent with functional mobility   Requires PT Follow-Up: No  Activity Tolerance  Activity Tolerance: Patient limited by pain  Activity Tolerance Comments: Abdominal pain with upright positioning resulting in slow ambulation/ requiring inc time to mobilize secondary to pain     Plan   Plan  Plan:  (D/C PT, pt is independent)  Plan Comment: D/C PT  Safety Devices  Type of Devices: Call light within reach,Left in bed,Nurse notified (Nurse Mary Kay Elaine)     Restrictions  Restrictions/Precautions  Restrictions/Precautions: General Precautions,Up as Tolerated     Subjective   General  Chart Reviewed: Yes  Patient assessed for rehabilitation services?: Yes  Additional Pertinent Hx: The patient is 59 y.o. male who presents to [address] [place] [address] for management of right pleural effusion. Patient reports that he then felt ill after catching a cold from his wife at home, and developed cough, which time he had coughing fits last week when he would cough strongly for hours. Pt was hypotensive and tachycardic on admission.  S/P thoracentesis on 4/19 with conditions slowly improving per pt report  Response To Previous Treatment: Not applicable  Family / Caregiver Present: Yes (Trudee General, Dtrs, grandchildren)  Referring Practitioner: Ivon Maldonado  Referral Date : 04/19/22  Diagnosis: Pleural effusion  Follows Commands: Within Functional Limits  General Comment  Comments: OK per nurse Caren Curry to proceed with PT eval  Subjective  Subjective: pt is agreeable to mobility assessments         Social/Functional History  Social/Functional History  Lives With: Spouse  Type of Home: House  Home Layout: One level (no basement)  Home Access: Level entry  Bathroom Shower/Tub: Tub/Shower unit  Bathroom Toilet: Standard  Bathroom Equipment:  (none)  Bathroom Accessibility: Accessible  Home Equipment:  (none)  Has the patient had two or more falls in the past year or any fall with injury in the past year?: No  Receives Help From: Family  ADL Assistance: [address] [place] [address]: Independent  Homemaking Responsibilities: Yes (shared home duties with wife)  Ambulation Assistance: Independent (no AD)  Transfer Assistance: Independent (no AD)  Active : Yes  Additional Comments: Pt has supportive wife and family able to assist as needed  Vision/Hearing  Vision Exceptions: Wears glasses at all times  Hearing: Exceptions to [facility]  Hearing Exceptions: Hard of hearing/hearing concerns    Cognition   Orientation  Overall Orientation Status: Within Functional Limits     Objective               AROM RLE (degrees)  RLE AROM: WFL  AROM LLE (degrees)  LLE AROM : WFL  AROM RUE (degrees)  RUE AROM : WFL  AROM LUE (degrees)  LUE AROM : WFL  Strength RLE  Strength RLE: WFL  Strength LLE  Strength LLE: WFL  Strength RUE  Strength RUE: WFL  Strength LUE  Strength LUE: WFL     Sensation  Overall Sensation Status:  (pt denies N/T)     Bed mobility  Supine to Sit: Supervision  Sit to Supine: Supervision  Scooting: Supervision  Comment: Supervision provided for safety/awareness of lines; HOB fully elevated as pt does not tolerate lying flat at this time d/t difficulty with breathing. Pt seated at EOB x 1 minute while on room air; SpO2 @ 93-94% while seated EOB on room air. Transfers  Sit to Stand: Supervision  Stand to sit: Supervision  Comment: No AD needed for transfers; supervision provided for safety as pt is attached to IV/O2  Ambulation  Surface: level tile  Device: No Device  Assistance: Supervision  Quality of Gait: Even step lengths, pt steady but moving slowly and limited by increased abdominal pain with upright posture and amb  Gait Deviations: Slow Vane  Distance: 76'  Comments: Pt ambulates within room and hallway managing his IV pole; supervision provided for safety and for monitoring of SpO2 levels with mobility. SpO2 remains at 94-95% while ambulating and post ambulation while on 1L O2 via NC. Stairs/Curb  Stairs?: No     Balance  Posture: Good  Sitting - Static: Good  Sitting - Dynamic: Good  Standing - Static: Good  Standing - Dynamic: Good  Comments: Standing balance assessed with NO device used.  No LOB to report.  pt does ambulate while managing IV but does not rely on pole for supporting self           AM-PAC Score     AM-PAC Inpatient Mobility without Stair Climbing Raw Score : 20 (04/20/22 1445)  AM-PAC Inpatient without Stair Climbing T-Scale Score : 60.57 (04/20/22 1445)  Mobility Inpatient CMS 0-100% Score: 0 (04/20/22 1445)  Mobility Inpatient without Stair CMS G-Code Modifier : [facility] [place] (04/20/22 1445)       Goals  Patient Goals   Patient goals : to return home       Therapy Time   Individual Concurrent Group Co-treatment   Time In 1445         Time Out 1457         Minutes 12                 Lawayne Apley, PT

## 2022-04-20 NOTE — PROGRESS NOTES
Physician Progress Note      Walter Alfaro  [facility] #:                  [id number]  :                       1960  ADMIT DATE:       2022 5:31 PM  [address] [place] DATE:  RESPONDING  PROVIDER #:        Denver Cheney MD          QUERY TEXT:    Patient admitted with sepsis. Noted documentation of acute respiratory failure   by attending in IM progress note on . In order to support the diagnosis   of acute respiratory failure, please include additional clinical indicators in   your documentation. Or please document if the diagnosis of acute respiratory   failure has been ruled out after further study. The medical record reflects the following:  Risk Factors:  Clinical Indicators: RR 14-23; pt satting 89-92% on RA improving to 92-96% on   2L O2 via NC; per ED notes-->Increased work of breathing, splinting with   respiration, no wheezing; per H/P-->Negative for apnea, cough, chest   tightness, shortness of breath, wheezing and stridor; per GS consult--> normal   respiratory rate and rhythm, lungs clear to auscultation without wheezes,   rales or rhonchi. No accessory muscle use;  Treatment:  2-3L O2 via NC, pulm consult, monitoring, hospital admission    Acute Respiratory Failure Clinical Indicators per [employer] Training Guide and   Quick Reference Guide:  pO2 < 60 mmHg or SpO2 (pulse oximetry) < 91% breathing room air  pCO2 > 50 and pH < 7.35  P/F ratio (pO2 / FIO2) < 300  pO2 decrease or pCO2 increase by 10 mmHg from baseline (if known)  Supplemental oxygen of 40% or more  Presence of respiratory distress, tachypnea, dyspnea, shortness of breath,   wheezing  Unable to speak in complete sentences  Use of accessory muscles to breathe  Extreme anxiety and feeling of impending doom  Tripod position  Confusion/altered mental status/obtunded  Options provided:  -- Acute Respiratory Failure as evidenced by, Please document evidence.   -- Acute Respiratory Failure ruled out after study  -- Other -

## 2022-04-20 NOTE — CARE COORDINATION
ONGOING DISCHARGE PLAN:    Patient is alert and oriented x4. Spoke with patient regarding discharge plan and patient confirms that plan is still to return to home w/ Wife. Denies VNS. Continue to follow for possible Home O2, Sating 94% on 3LNC, Per Nursing, weaning. Pulmonary following. HGB 7.3. Will continue to follow for additional discharge needs.     Electronically signed by Cj Merrill RN on 4/20/2022 at 9:48 AM

## 2022-04-20 NOTE — PROGRESS NOTES
[address]    PROGRESS NOTE             4/20/2022    8:14 AM    Name:   Coral Burrell  MRN:     [id number]     Acct:      [de-identified]   Room:   2123/2123-01   Day:  4  Admit Date:  4/16/2022  5:31 PM    PCP:  Susan Humphrey MD  Code Status:  Full Code    Subjective:     C/C:   Chief Complaint   Patient presents with    Cough     Interval History Status: improved. Patient was seen and examined at bedside. No acute events overnight. Patient reports he is able to tolerate his soft diet without issue, reports his diet is not full at this time. Patient reports improvement in cough, however still very painful when he does. Patient reports that his pain is also improved, not requiring pain medication as frequently as available. Still using 2.5 L nasal cannula, wean as tolerated keeping SPO2 greater than 88% today. Continue using incentive spirometry. Complaining of loose stools, however will stop daily scheduled stool softener/laxative. States he is able to get up and move about as tolerated in his room independently, however we will still have physical therapy come see him. Patient not currently on anticoagulation or medical DVT prophylaxis due to acute bleed, anemia. Continue to monitor hemoglobin every 8 hours. Will restart as appropriate. Patient is looking forward to going home.     Brief History:     The patient is C 1 y.o.  Non- / non  male who presents withCough   and he is admitted to the hospital for the management of right pleural effusion.     Patient reports that he was home for the last week, and had recently caught a cold from his wife, had a brief viral illness with cough that persisted. Maria C López reports that he then felt ill, and developed cough, which time he had coughing fits last week when he would cough strongly for hours.  Patient reports that on Friday he did go to see his outpatient doctor in the office but then presented to the emergency department the following day for extreme chest pain, shortness of breath.  He was found to be hypotensive in the emergency department and was bolused 2 L of fluid.  Met SIRS criteria 2 out of 4 with tachycardia, white blood cell count, and did have low blood pressure 61/51 on presentation.     Initial work-up for sepsis was performed, blood cultures drawn, fluid bolus given, maintenance fluids given, imaging showed large right pleural effusion, right rectus sheath hematoma.      Pro-Taz was slightly elevated, however given imaging, pulmonology does not feel strongly about any antibiotics at this time.  Patient is from home and does not have any predisposing factors for Pseudomonas infection     Patient is saturating mid 90s on 2 L nasal cannula, does not use home oxygen.  Possible history of obstructive sleep apnea, ported the patient had an home sleep apnea study which was negative.      Patient reports today he is doing fine, slightly better than yesterday, however reports he is still coughing quite a bit which causes him a lot of right-sided discomfort     4/17: Plan today is possible bedside thoracentesis by pulmonology, IR has been consulted for possible hematoma evacuation. Discontinued antibiotics due to low suspicion for bacterial cause of pleural effusion.     4/18: Dx thoracentesis performed    4/19: exudative thoracentesis, pending further results    4/20: Continue pain management, continue antitussives, continue to monitor hemoglobin. Awaiting results of body fluid analysis from thoracentesis. Review of Systems:     Review of Systems   Constitutional: Positive for appetite change (Does report this is decreased). Negative for chills, diaphoresis, fatigue and fever. HENT: Negative. Eyes: Negative. Respiratory: Negative. Negative for apnea, cough, choking, chest tightness, shortness of breath, wheezing and stridor. Cardiovascular: Negative. Negative for chest pain, palpitations and leg swelling. Gastrointestinal: Positive for abdominal pain (Right-sided, and area of hematoma) and diarrhea (Complains of some loose stools, improved). Negative for abdominal distention, constipation, nausea and vomiting. Endocrine: Negative. Genitourinary: Negative. Musculoskeletal: Negative. Skin: Positive for color change (Bruising to right side rib cage). Allergic/Immunologic: Negative. Neurological: Negative. Hematological: Negative. Psychiatric/Behavioral: Negative. Medications: Allergies:  No Known Allergies    Current Meds:   Scheduled Meds:    potassium chloride  10 mEq IntraVENous Q1H    pantoprazole  40 mg Oral QAM AC    sennosides-docusate sodium  2 tablet Oral BID    furosemide  20 mg Oral Daily    benzonatate  200 mg Oral TID    dextromethorphan  60 mg Oral 2 times per day    ferrous sulfate  325 mg Oral Every Other Day    metoprolol tartrate  25 mg Oral BID    losartan  50 mg Oral Daily    sodium chloride flush  5-40 mL IntraVENous 2 times per day     Continuous Infusions:    sodium chloride       PRN Meds: HYDROcodone-homatropine, oxyCODONE-acetaminophen, sodium chloride flush, sodium chloride flush, sodium chloride, ondansetron **OR** ondansetron, fentanNYL, acetaminophen    Data:     Past Medical History:   has a past medical history of CAD (coronary artery disease), Cerebral artery occlusion with cerebral infarction ([facility] [address] CHF (congestive heart failure) ([facility] [address] Hx of heart artery stent, Hyperlipidemia, Hypertension, and MI (myocardial infarction) ([facility] [address] Social History:   reports that he quit smoking about 35 years ago. His smoking use included cigarettes. He started smoking about 47 years ago. He has a 30.00 pack-year smoking history. He has never used smokeless tobacco. He reports current alcohol use. He reports that he does not use drugs.      Family History:   Family History Problem Relation Age of Onset    Stroke Mother     Heart Disease Mother     Heart Disease Father     Diabetes Father     Stroke Father        Vitals:  /86   Pulse 92   Temp 98.5 °F (36.9 °C) (Oral)   Resp 16   Ht 6' 1\" (1.854 m)   Wt 299 lb 13.2 oz (136 kg)   SpO2 94%   BMI 39.56 kg/m²   Temp (24hrs), Av.5 °F (36.9 °C), Min:98.2 °F (36.8 °C), Max:98.8 °F (37.1 °C)    No results for input(s): POCGLU in the last 72 hours. I/O(24Hr): Intake/Output Summary (Last 24 hours) at 2022 0814  Last data filed at 2022 3395  Gross per 24 hour   Intake 1357.73 ml   Output 525 ml   Net 832.73 ml       Labs:  [unfilled]    No results found for: SPECIAL  Lab Results   Component Value Date/Time    CULTURE NO GROWTH 2 DAYS 2022 03:30 PM       [facility]    Radiology:    ECHO Complete 2D W Doppler W Color    Result Date: 2022  [address] [facility] Transthoracic Echocardiography Report (TTE)  Patient Name  Suhail Kramer    Date of Study               2022               Lashonda Cui   Date of      1960  Gender                      Male  Birth   Age          59 year(s)  Race                           Room Number  2006        Height:                     73 inch, 185.42 cm   Corporate ID [id number]    Weight:                     300 pounds, 136.1 kg  #   Patient Acct [de-identified]   BSA:          2.56 m^2      BMI:      39.58  #                                                              kg/m^2   MR #         [id number]      Sonographer                 Chanel Guan   Accession #  [id number]  Interpreting Physician      Hammad Nieto 59   Fellow                   Referring Nurse                           Practitioner   Interpreting             Referring Physician         Ema Johnson,  Fellow  Additional Comments Technically difficult study. Type of Study   TTE procedure:2D Echocardiogram, M-Mode, Doppler, Color Doppler, Contrast  study.   Procedure Date Date: 2022 Start: 10:01 AM Study Location: [facility] Technical Quality: Limited visualization due to body habitus. Indications:Dyspnea/SOB and Pleural effusion. History / Tech. Comments: CAD, CHF, Stents x6, HLD, HTN, MI Patient Status: Inpatient Contrast Medium: Definity. Amount - 3 ml Height: 73 inches Weight: 300.01 pounds BSA: 2.56 m^2 BMI: 39.58 kg/m^2 Rhythm: Sinus tachycardia HR: 102 bpm BP: 152/107 mmHg Allergies   - *No Known Allergies. CONCLUSIONS Summary Technically difficult study Left ventricle is normal in size. Mild left ventricular hypertrophy. Normal left ventricular systolic function. Estimated LV EF 55 %. Mild Apical hypokinesis. Left atrium is normal in size. Normal right ventricular size and function. No significant valvular regurgitation or stenosis seen. No significant pericardial effusion is seen. Aortic root dimension is at upper limit of normal. IVC not well visualized Signature ----------------------------------------------------------------------------  Electronically signed by Prieto Oneill(Interpreting physician) on  04/18/2022 03:46 PM ---------------------------------------------------------------------------- ----------------------------------------------------------------------------  Electronically signed by Chanel Guan(Sonographer) on 04/18/2022 12:06  PM ---------------------------------------------------------------------------- FINDINGS Left Atrium Left atrium is normal in size. Left Ventricle Left ventricle is normal in size. Mild left ventricular hypertrophy. Normal left ventricular systolic function. Estimated LV EF 55 %. Apical hypokinesis. Right Atrium Right atrium is normal in size. Right Ventricle Normal right ventricular size and function. Mitral Valve No obvious valvular abnormality seen. No evidence of mitral regurgitation. Aortic Valve No obvious valvular abnormality seen. No evidence of aortic insufficiency or stenosis.  Tricuspid Valve Tricuspid valve was not well visualized. Insignificant tricuspid regurgitation, unable to estimate RVSP. Pulmonic Valve Pulmonic valve was not well visualized. No evidence of pulmonic insufficiency or stenosis. Pericardial Effusion No significant pericardial effusion is seen. Pleural Effusion No pleural effusion seen. Miscellaneous Aortic root dimension is at upper limit of normal. IVC not well visualized. M-mode / 2D Measurements & Calculations:   LVIDd:4.26 cm(3.7 - 5.6 cm)      Diastolic EXORML:01.9 ml  GFUNJ:8.0 cm(2.2 - 4.0 cm)       Systolic WUXHOU:25 ml  YFYO:1.68 cm(0.6 - 1.1 cm)       Aortic Root:3.8 cm(2.0 - 3.7 cm)  LVPWd:1.18 cm(0.6 - 1.1 cm)      LA Dimension: 3.5 cm(1.9 - 4.0 cm)  Fractional Shortenin.27 %    LA volume/Index: 86.5 ml /34m^2  Calculated LVEF (%): 71.54 %     LVOT:2.7 cm   Mitral:                              Aortic   Peak E-Wave: 0.77 m/s                Peak Velocity: 1.46 m/s  Peak A-Wave: 1.16 m/s                Mean Velocity: 1.01 m/s  E/A Ratio: 0.66                      Peak Gradient: 8.53 mmHg  Peak Gradient: 2.35 mmHg             Mean Gradient: 5 mmHg  Deceleration Time: 197 msec                                        Area (continuity): 4.62 cm^2                                       AV VTI: 23.8 cm  Septal Wall E' velocity:0.05 m/s Lateral Wall E' velocity:0.13 m/s    CT ABDOMEN PELVIS WO CONTRAST Additional Contrast? None    Result Date: 2022  EXAMINATION: CT OF THE ABDOMEN AND PELVIS WITHOUT CONTRAST 2022 8:39 am TECHNIQUE: CT of the abdomen and pelvis was performed without the administration of intravenous contrast. Multiplanar reformatted images are provided for review. Dose modulation, iterative reconstruction, and/or weight based adjustment of the mA/kV was utilized to reduce the radiation dose to as low as reasonably achievable.  COMPARISON: CTA of the chest, abdomen, and pelvis 2022 HISTORY: ORDERING SYSTEM PROVIDED HISTORY: abdominal destenion and rule out bleed TECHNOLOGIST PROVIDED HISTORY: abdominal destenion and rule out bleed Reason for Exam: abdominal destenion, pt states abdominal pain FINDINGS: Lower Chest: Similar large right pleural effusion with atelectasis of the right lower lobe. Partially visualized patchy consolidative and ground-glass opacities in the left upper lobe abutting the major fissure. 3 vessel coronary artery calcification. No pericardial effusion. Organs: Evaluation is limited by the absence of contrast.  Minimally increased attenuation of the bile in the gallbladder, possibly due to vicarious excretion of contrast.  Unenhanced liver, spleen, pancreas, adrenal glands, and kidneys are without acute abnormality. 13 mm indeterminate (50 HU) lesion in the posterior upper pole right kidney. GI/Bowel: No bowel obstruction. Colonic diverticulosis without diverticulitis. Appendix is not definitively identified. Pelvis: Mild bladder wall prominence which may be due to lack of distension. Coarse calcifications within the normal caliber prostate. Seminal vesicles are grossly symmetric. Peritoneum/Retroperitoneum: No free fluid, free air, or lymphadenopathy. Aortoiliac and femoral atherosclerosis without aneurysm. Right femoral central venous catheter terminates in the proximal right external iliac vein. Bones/Soft Tissues: No significant change in size in the large right rectus sheath hematoma spanning roughly 16 cm in both transverse and craniocaudal dimensions and approximately 5 cm in AP dimension. No significant change in the asymmetric enlargement of the right serratus anterior muscle. There is increased overlying subcutaneous fat stranding along the right rectus and right lateral flank extending along the oblique musculature and extending into the upper visualized portion of the right lateral hip. Subcutaneous fat stranding is also present along the left lateral flank and upper lateral hip, though to a far lesser degree. No acute bony findings.      No significant change in size of the large right rectus sheath hematoma or of the asymmetric enlargement of the right serratus anterior muscle, though there is increase in the overlying subcutaneous fat stranding, as well as new fat stranding to a lesser degree along the left lateral flank. 13 mm indeterminate lesion in the posterior upper pole left kidney, possibly hemorrhagic or proteinaceous cyst.  This could be characterized at a clinically appropriate time with dedicated multiphase renal CT or MRI. Similar large right pleural effusion with atelectasis of the right lower lobe. Half partially visualized patchy consolidative and ground-glass opacities in the left upper lobe abutting the fissure, suspicious for possible superimposed infection/inflammation. XR CHEST PORTABLE    Result Date: 4/19/2022  EXAMINATION: ONE XRAY VIEW OF THE CHEST 4/19/2022 6:34 am COMPARISON: Chest radiograph performed 04/18/2022. HISTORY: ORDERING SYSTEM PROVIDED HISTORY: follow uo pleural effusion TECHNOLOGIST PROVIDED HISTORY: follow uo pleural effusion Reason for Exam: hx. of pleural effusion, follow up. FINDINGS: There are bibasilar infiltrates. There is no pneumothorax. The heart is prominent. The upper abdomen is unremarkable. The extrathoracic soft tissues are unremarkable. Bibasilar infiltrates representing atelectasis versus pneumonia. XR CHEST PORTABLE    Result Date: 4/18/2022  EXAMINATION: ONE XRAY VIEW OF THE CHEST 4/18/2022 12:30 pm COMPARISON: 04/16/2022 HISTORY: ORDERING SYSTEM PROVIDED HISTORY: Status post thoracentesis TECHNOLOGIST PROVIDED HISTORY: Status post thoracentesis Reason for Exam: Status post thoracentesis FINDINGS: Significantly improved right pleural effusion following thoracentesis. Stable small left pleural effusion. No pneumothorax. Cardiac size is enlarged. No other significant changes are seen     Significantly improved right pleural effusion following thoracentesis.  Stable small left pleural effusion. XR CHEST PORTABLE    Result Date: 4/16/2022  EXAMINATION: ONE XRAY VIEW OF THE CHEST 4/16/2022 2:49 pm COMPARISON: March 14 2022 HISTORY: ORDERING SYSTEM PROVIDED HISTORY: hypotension, SOB TECHNOLOGIST PROVIDED HISTORY: hypotension, SOB Reason for Exam: hypotension, SOB FINDINGS: Marginal inspiration is noted. A large right pleural effusion is noted, left basilar opacification, likely due to a combination of effusion, atelectasis and or consolidation. Cardiomegaly is noted. Vascular markings are distinct. No pneumothorax noted. No acute osseous abnormalities present. 1. Large right pleural effusion 2. Right basilar opacification, which may be due to a combination of atelectasis, effusion and or consolidation. CTA CHEST ABDOMEN PELVIS W CONTRAST    Result Date: 4/16/2022  EXAMINATION: CTA OF THE CHEST, ABDOMEN AND PELVIS WITH CONTRAST 4/16/2022 3:30 pm: TECHNIQUE: CTA of the chest, abdomen and pelvis was performed after the administration of intravenous contrast.  Multiplanar reformatted images are provided for review. MIP images are provided for review. Dose modulation, iterative reconstruction, and/or weight based adjustment of the mA/kV was utilized to reduce the radiation dose to as low as reasonably achievable. COMPARISON: Chest x-ray dated April 16, 2022 HISTORY: ORDERING SYSTEM PROVIDED HISTORY: hypotension, Chest pain, r/o disection TECHNOLOGIST PROVIDED HISTORY: hypotension, Chest pain, r/o disection Decision Support Exception - unselect if not a suspected or confirmed emergency medical condition->Emergency Medical Condition (MA) Reason for Exam: hypotension, Chest pain, r/o disection Relevant Medical/Surgical History: chf, stents, cardiac cath, appendectomy FINDINGS: CTA CHEST: Thoracic aorta: No evidence of thoracic aortic aneurysm or dissection. No acute abnormality of the aorta. Mediastinum: No adenopathy is present.   Normal enhancement of the pulmonary arterial system is present. Extensive coronary arterial calcifications are noted. Small pericardial effusion is present. Fatty changes are present involving the apex of the left ventricle, related to prior infarct. Lungs/Pleura: A large right pleural effusion is present, with compressive atelectasis involving the right lower lobe. Superimposed infection is difficult to exclude. The trachea and mainstem bronchi appear normal. Paraseptal emphysema is present within the right apex. Minimal patchy airspace disease is present within the left upper lobe, likely infectious in etiology. Soft Tissues/Bones: No acute osseous abnormality is present involving the thorax. Asymmetric enlargement of the right serratus anterior muscle is present, compared to the left. This may represent intramuscular hematoma or inflammation/myositis. CTA ABDOMEN: Abdominal aorta/Branches: Normal enhancement of the abdominal aorta and branch vessels is present without evidence of aneurysm formation or dissection. Organs: Hepatic steatosis is present. The gallbladder, pancreas, spleen, adrenal glands, and kidneys demonstrate no acute abnormality. GI/Bowel: Stomach appears normal.  Small bowel appears normal without evidence of obstruction. No evidence of appendicitis is present. Scattered colonic diverticula, without evidence of diverticulitis. Peritoneum/Retroperitoneum: No adenopathy is present. No free fluid is present within the abdomen. No free air is noted. Bones/Soft Tissues: No acute osseous abnormality is present involving the abdomen. Marked enlargement of the right rectus sheath and rectus abdominus muscle is present, consistent with a large rectus sheath hematoma. Hematoma extends superiorly, along the anterolateral right chest wall, likely involving the serrated anterior muscle is well.   Right rectus sheath hematoma measures approximately 15-16 cm in transverse dimension, 4-5 cm in AP dimension, and approximately 16 cm in cephalocaudal dimension. No evidence of active bleeding is present. Soft tissue stranding is present throughout the subcutaneous fat of the right flank and anterior abdominal wall. CTA PELVIS: Aorta/Iliacs: Normal enhancement of the aortic bifurcation, common iliac, internal and external iliac arteries common femoral arteries is noted bilaterally without evidence of aneurysm formation or dissection. Other: Urinary bladder is nondistended. Prostate gland is normal size. No free fluid or free air is present within the pelvis. Bones/Soft Tissues: No acute osseous abnormality is present involving the pelvis. A small umbilical hernia is present containing fat. 1. No evidence of dissection, aneurysm formation, or intramural hematoma formation involving the thoracic or abdominal aorta 2. Large right pleural effusion, with atelectasis involving the right lower lobe. Superimposed infection is difficult to exclude 3. Large right rectus sheath hematoma, measuring approximately 15-16 cm in transverse dimension, 4-5 cm in AP dimension, and approximately 16 cm in cephalocaudal dimension 4. Asymmetric enlargement of the right serratus anterior muscle alyce may related to intramuscular hematoma formation, or myositis. 5.  Critical results were called by Dr. Adelita Paul to Dr. Alise Peralta on 4/16/2022 at 7:11 p.m. hours. US THORACENTESIS Which side should the procedure be performed? Right    Result Date: 4/18/2022  PROCEDURE: ULTRASOUND CHEST FOR THORACENTESIS 4/18/2022 HISTORY: ORDERING SYSTEM PROVIDED HISTORY: Pleural effusion TECHNOLOGIST PROVIDED HISTORY: Pleural effusion Which side should the procedure be performed?->Right FINDINGS: Ultrasound right chest shows a small pleural effusion. Fluid was localized for subsequent thoracentesis performed by Dr. Jose Alfredo Palafox. Please correlate with procedure report for additional details. Small right pleural effusion localized for thoracentesis.          Physical Examination:        Physical Exam  Vitals and nursing note reviewed. Constitutional:       General: He is not in acute distress. Appearance: Normal appearance. He is not ill-appearing or diaphoretic. HENT:      Head: Normocephalic and atraumatic. Nose: Nose normal.      Mouth/Throat:      Mouth: Mucous membranes are moist.   Eyes:      Extraocular Movements: Extraocular movements intact. Cardiovascular:      Rate and Rhythm: Normal rate and regular rhythm. Heart sounds: Normal heart sounds. No murmur heard. No friction rub. No gallop. Pulmonary:      Effort: No respiratory distress. Breath sounds: No stridor. No wheezing, rhonchi or rales. Comments: Somewhat antalgic breathing, continuing encouraging incentive spirometry; breath sounds slightly diminished bilateral lower lung fields, likely due to body habitus, antalgic breathing/limited by pain  Chest:      Chest wall: No tenderness. Abdominal:      General: Bowel sounds are normal.      Comments: Obese, protuberant abdomen   Musculoskeletal:         General: No swelling. Normal range of motion. Cervical back: Normal range of motion. Right lower leg: No edema. Left lower leg: No edema. Skin:     General: Skin is warm and dry. Findings: Bruising (Right side rib cage bruising more confluent, deep purple, extending beyond initial margins outlined at presentation) present. Neurological:      General: No focal deficit present. Mental Status: He is alert. Psychiatric:         Mood and Affect: Mood normal.         Behavior: Behavior normal.         Thought Content:  Thought content normal.         Judgment: Judgment normal.      Comments: Is looking forward to going home           Assessment:        Primary Problem  Pleural effusion    Active Hospital Problems    Diagnosis Date Noted    Acute respiratory failure with hypoxia (HCC) [J96.01] 04/19/2022    Iron deficiency anemia [D50.9] 04/18/2022    Acute blood loss anemia [D62] 04/18/2022    Rectus sheath hematoma [S30.1XXA] 04/18/2022    Primary hypertension [I10] 04/17/2022    Pleural effusion [J90] 04/16/2022       Plan:        Severe sepsis with right pleural effusion secondary to severe cough, likely due to viral pneumonia versus recent change to losartan from lisinopril; Septic shock ruled out due to responsiveness of hypotension to fluid bolus -resolved  -Initial blood pressure 61/51, responded to 2 L fluid bolus  -initial lactic acid 2.8, trended to 2.4  -WBCs 21.3>13.5>11.2>9.7  -temperature on admission 97.8  -RR on admission 18  -HR on admission 118  -bedside thoracentesis per pulm 4/18  -Antitussives, pain management  -Procalcitonin 0.11  -Cefepime, vancomycin discontinued due to marginally elevated Pro-Taz, unlikely bacterial cause of sepsis  -Blood cultures x2 no growth to date  -MRSA nasal swab negative  -Influenza A, B, not detected  -SARS-CoV-2 not detected  -Strep pneumo negative  -Viral respiratory panel negative for viruses tested  -Urinalysis and urine culture obtained, pending  -proBNP 253  -Saturating in the high 90s on 3 L nasal cannula; wean as tolerated, SPO2>88%  -exudative pleural effusion, NGTD     Right rectus sheath hematoma  -Continue to monitor  -IR, gen surg consulted, no intervention  -Antitussives, pain management  -Fentanyl 50 mcg IV every 2 hours as needed for pain  -Percocet 5/3/2025 every 4 hours as needed when tolerating p.o.  -H&H Q8H     Acute kidney injury - resolved  -Baseline 0.8  -On admission 1.86>1.27>0.79>0.7  -NS DC'ed      Combination acute blood loss, dilutional, and iron deficiency anemia  -Hemoglobin 10.7>8.3>8.0>7.9>7.7>7.7>7.3  -iron studies showed KIMI  -ferrous sulfate started QOD  -Transfuse if hemoglobin less than 7  -H&H Q8H     Possible DELLA  -Pulmonology recommends outpatient sleep lab study  -on 2.5L NC, wean as tolerated and keep saturation >88%     History of hypertension  -Home Cozaar 50 mg p.o.

## 2022-04-20 NOTE — PROGRESS NOTES
Pulmonary Progress Note  [facility]      Patient - Jignesh John,  Age - 59 y.o.    - 1960      Room Number - 2123/2123-01   N -  [id number]   [facility]  Date of Admission -  2022  5:31 PM        Arti Rodgers MD  Primary Care Physician - Millie Li MD     SUBJECTIVE   Pt states he is feeling better everyday. He does not c/o new pain. He still has tenderness near areas of bruising. States that he is taking less pain killers. He does not c/o SOB while resting. He does get short of breath with exertion. Coughing does still cause a great deal of pain but states cough medicine is helping. He was on 3L O2 nsl but able to be weaned to 1 L O2 nsl. He is still urinating, stooling, and eating normally. OBJECTIVE   VITALS    height is 6' 1\" (1.854 m) and weight is 299 lb 13.2 oz (136 kg). His oral temperature is 98.5 °F (36.9 °C). His blood pressure is 138/86 and his pulse is 92. His respiration is 16 and oxygen saturation is 94%. Body mass index is 39.56 kg/m².   Temperature Range: Temp: 98.5 °F (36.9 °C) Temp  Av.5 °F (36.9 °C)  Min: 98.2 °F (36.8 °C)  Max: 98.8 °F (37.1 °C)  BP Range:  Systolic (03DJJ), JUJ:573 , Min:112 , KIRILL:944     Diastolic (37SVD), KUB:79, Min:67, Max:89    Pulse Range: Pulse  Av.5  Min: 86  Max: 102  Respiration Range: Resp  Av.7  Min: 16  Max: 22  Current Pulse Ox[de-identified]  SpO2: 94 %  24HR Pulse Ox Range:  SpO2  Av.8 %  Min: 88 %  Max: 98 %  Oxygen Amount and Delivery: O2 Flow Rate (L/min): 3 L/min    Wt Readings from Last 3 Encounters:   22 299 lb 13.2 oz (136 kg)   04/15/22 292 lb (132.5 kg)   22 287 lb 3.2 oz (130.3 kg)       I/O (24 Hours)    Intake/Output Summary (Last 24 hours) at 2022 1006  Last data filed at 2022 0718  Gross per 24 hour   Intake 1357.73 ml   Output 525 ml   Net 832.73 ml       EXAM     General Appearance  Awake, alert, oriented, in no acute distress. Obese  HEENT - normocephalic, atraumatic. []  Mallampati  [] Crowded airway   [] Macroglossia  []  Retrognathia  [] Micrognathia  [x]  Normal tongue size []  Normal Bite  [] [place] sign positive    Neck - Supple,  trachea midline   Lungs - CTA. No wheezes, no rales, no rhonchi. No retractions. No accessory muscle use. Cardiovascular - Heart sounds are normal.  Regular rate and rhythm   Abdomen - Tenderness moving to right lateral side of abdomen, closer to bruising. Soft, nondistended, no masses or organomegaly  Neurologic - There are no focal motor or sensory deficits  Skin - Large red and purple bruise extending R lateral abdomen and flank. Yellow bruising around the periphery. No increase in size from yesterday. Healing purple/yellow baseball size bruise at epigastric area.  No bleeding  Extremities - No clubbing, cyanosis, edema    MEDS      potassium chloride  10 mEq IntraVENous Q1H    pantoprazole  40 mg Oral QAM AC    sennosides-docusate sodium  2 tablet Oral BID    furosemide  20 mg Oral Daily    benzonatate  200 mg Oral TID    dextromethorphan  60 mg Oral 2 times per day    ferrous sulfate  325 mg Oral Every Other Day    metoprolol tartrate  25 mg Oral BID    losartan  50 mg Oral Daily    sodium chloride flush  5-40 mL IntraVENous 2 times per day      sodium chloride       HYDROcodone-homatropine, oxyCODONE-acetaminophen, sodium chloride flush, sodium chloride flush, sodium chloride, ondansetron **OR** ondansetron, fentanNYL, acetaminophen    LABS   CBC   Recent Labs     04/20/22  0440   WBC 9.7   HGB 7.3*   HCT 21.8*   MCV 83.1        BMP:   Lab Results   Component Value Date     04/20/2022    K 3.6 04/20/2022     04/20/2022    CO2 29 04/20/2022    BUN 14 04/20/2022    LABALBU 3.9 04/16/2022    CREATININE 0.70 04/20/2022    CALCIUM 7.8 04/20/2022    GFRAA >60 04/20/2022    LABGLOM >60 04/20/2022     ABGs:No results found for: PHART, PO2ART, DID6GWP   Lab Results   Component Value Date    MODE NOT REPORTED 04/19/2021     Ionized Calcium:  No results found for: IONCA  Magnesium:  No results found for: MG  Phosphorus:  No results found for: PHOS     LIVER PROFILE No results for input(s): AST, ALT, LIPASE, BILIDIR, BILITOT, ALKPHOS in the last 72 hours. Invalid input(s): AMYLASE,  ALB  INR No results for input(s): INR in the last 72 hours. PTT   Lab Results   Component Value Date    APTT 31.8 04/16/2022         RADIOLOGY     (See actual reports for details)    ASSESSMENT/PLAN   Principal Problem:    Pleural effusion  Active Problems:    Primary hypertension    Iron deficiency anemia    Acute blood loss anemia    Rectus sheath hematoma    Acute respiratory failure with hypoxia (HCC)  Resolved Problems:    ISA (acute kidney injury) ([facility] [address]  Plan  Monitor H&H closely - transfuse if below 7  Monitor bruising - appears healing  O2 supplementation dropped from 3L to 1L - consider increased weaning  Home O2 evaluation   Final cytometry results pending  Continue to mobilize with physical therapy      Electronically signed by BRYSON Atkinson on 4/20/2022 at 10:06 AM    Patient seen and examined independently by me. Above discussed and I agree with medical student note except where indicated in the EMR revision history. Also see my additional comments and changes indicated by discrete font, text color, italics, and/or initials. Labs, cultures, and radiographs where available were reviewed. He continues to improve, we did take him off his oxygen saturations remained around 94%. We will decrease the frequency of hemoglobin and hematocrits. Transfuse only if hemoglobin less than 7. Flow cytometry results negative for any malignancy however we are still awaiting cytology results. No evidence of pleural space infection.     Electronically signed by Gisela Johnson MD on 4/20/2022 at 3:13 PM

## 2022-04-20 NOTE — PLAN OF CARE
Problem: Pain:  Goal: Pain level will decrease  Description: Pain level will decrease  Outcome: Ongoing     Problem: Pain:  Goal: Control of acute pain  Description: Control of acute pain  Outcome: Ongoing     Problem: Falls - Risk of:  Goal: Will remain free from falls  Description: Will remain free from falls  Outcome: Met This Shift     Problem: Discharge Planning:  Goal: Discharged to appropriate level of care  Description: Discharged to appropriate level of care  Outcome: Ongoing     Problem: Gas Exchange - Impaired:  Goal: Levels of oxygenation will improve  Description: Levels of oxygenation will improve  Outcome: Ongoing     Problem: Skin Integrity:  Goal: Will show no infection signs and symptoms  Description: Will show no infection signs and symptoms  Outcome: Ongoing

## 2022-04-21 VITALS
DIASTOLIC BLOOD PRESSURE: 84 MMHG | HEART RATE: 90 BPM | OXYGEN SATURATION: 94 % | TEMPERATURE: 98.9 F | HEIGHT: 73 IN | BODY MASS INDEX: 39.74 KG/M2 | WEIGHT: 299.83 LBS | SYSTOLIC BLOOD PRESSURE: 130 MMHG | RESPIRATION RATE: 18 BRPM

## 2022-04-21 LAB
ABSOLUTE EOS #: 0.3 K/UL (ref 0–0.4)
ABSOLUTE LYMPH #: 2.2 K/UL (ref 1–4.8)
ABSOLUTE MONO #: 1.2 K/UL (ref 0.1–1.3)
ANION GAP SERPL CALCULATED.3IONS-SCNC: 7 MMOL/L (ref 9–17)
BASOPHILS # BLD: 1 % (ref 0–2)
BASOPHILS ABSOLUTE: 0.1 K/UL (ref 0–0.2)
BUN BLDV-MCNC: 13 MG/DL (ref 8–23)
CALCIUM SERPL-MCNC: 8 MG/DL (ref 8.6–10.4)
CHLORIDE BLD-SCNC: 99 MMOL/L (ref 98–107)
CO2: 30 MMOL/L (ref 20–31)
CREAT SERPL-MCNC: 0.73 MG/DL (ref 0.7–1.2)
CULTURE: NORMAL
CULTURE: NORMAL
EOSINOPHILS RELATIVE PERCENT: 3 % (ref 0–4)
GFR AFRICAN AMERICAN: >60 ML/MIN
GFR NON-AFRICAN AMERICAN: >60 ML/MIN
GFR SERPL CREATININE-BSD FRML MDRD: ABNORMAL ML/MIN/{1.73_M2}
GLUCOSE BLD-MCNC: 102 MG/DL (ref 70–99)
HCT VFR BLD CALC: 23.3 % (ref 41–53)
HEMOGLOBIN: 7.7 G/DL (ref 13.5–17.5)
LYMPHOCYTES # BLD: 20 % (ref 24–44)
MCH RBC QN AUTO: 27.7 PG (ref 26–34)
MCHC RBC AUTO-ENTMCNC: 33 G/DL (ref 31–37)
MCV RBC AUTO: 84 FL (ref 80–100)
MONOCYTES # BLD: 11 % (ref 1–7)
PDW BLD-RTO: 13.6 % (ref 11.5–14.9)
PLATELET # BLD: 384 K/UL (ref 150–450)
PMV BLD AUTO: 6.2 FL (ref 6–12)
POTASSIUM SERPL-SCNC: 3.9 MMOL/L (ref 3.7–5.3)
RBC # BLD: 2.78 M/UL (ref 4.5–5.9)
SEG NEUTROPHILS: 65 % (ref 36–66)
SEGMENTED NEUTROPHILS ABSOLUTE COUNT: 7.3 K/UL (ref 1.3–9.1)
SODIUM BLD-SCNC: 136 MMOL/L (ref 135–144)
SPECIMEN DESCRIPTION: NORMAL
SPECIMEN DESCRIPTION: NORMAL
WBC # BLD: 11.1 K/UL (ref 3.5–11)

## 2022-04-21 PROCEDURE — 6370000000 HC RX 637 (ALT 250 FOR IP): Performed by: INTERNAL MEDICINE

## 2022-04-21 PROCEDURE — 36415 COLL VENOUS BLD VENIPUNCTURE: CPT

## 2022-04-21 PROCEDURE — 6370000000 HC RX 637 (ALT 250 FOR IP): Performed by: STUDENT IN AN ORGANIZED HEALTH CARE EDUCATION/TRAINING PROGRAM

## 2022-04-21 PROCEDURE — 99239 HOSP IP/OBS DSCHRG MGMT >30: CPT | Performed by: INTERNAL MEDICINE

## 2022-04-21 PROCEDURE — 6370000000 HC RX 637 (ALT 250 FOR IP): Performed by: SURGERY

## 2022-04-21 PROCEDURE — 80048 BASIC METABOLIC PNL TOTAL CA: CPT

## 2022-04-21 PROCEDURE — 85025 COMPLETE CBC W/AUTO DIFF WBC: CPT

## 2022-04-21 PROCEDURE — 6370000000 HC RX 637 (ALT 250 FOR IP)

## 2022-04-21 RX ORDER — OXYCODONE HYDROCHLORIDE AND ACETAMINOPHEN 5; 325 MG/1; MG/1
1 TABLET ORAL EVERY 6 HOURS PRN
Status: DISCONTINUED | OUTPATIENT
Start: 2022-04-21 | End: 2022-04-21 | Stop reason: HOSPADM

## 2022-04-21 RX ORDER — FERROUS SULFATE 325(65) MG
325 TABLET ORAL EVERY OTHER DAY
Qty: 30 TABLET | Refills: 3 | Status: SHIPPED | OUTPATIENT
Start: 2022-04-23 | End: 2022-09-21 | Stop reason: ALTCHOICE

## 2022-04-21 RX ORDER — BENZONATATE 200 MG/1
200 CAPSULE ORAL 3 TIMES DAILY
Qty: 21 CAPSULE | Refills: 0 | Status: SHIPPED | OUTPATIENT
Start: 2022-04-21 | End: 2022-04-28

## 2022-04-21 RX ORDER — DEXTROMETHORPHAN POLISTIREX 30 MG/5ML
60 SUSPENSION ORAL EVERY 12 HOURS SCHEDULED
Qty: 200 ML | Refills: 0 | Status: SHIPPED | OUTPATIENT
Start: 2022-04-21 | End: 2022-05-01

## 2022-04-21 RX ORDER — OXYCODONE HYDROCHLORIDE AND ACETAMINOPHEN 5; 325 MG/1; MG/1
1 TABLET ORAL EVERY 6 HOURS PRN
Qty: 12 TABLET | Refills: 0 | Status: SHIPPED | OUTPATIENT
Start: 2022-04-21 | End: 2022-04-24

## 2022-04-21 RX ADMIN — OXYCODONE HYDROCHLORIDE AND ACETAMINOPHEN 1 TABLET: 5; 325 TABLET ORAL at 01:38

## 2022-04-21 RX ADMIN — METOPROLOL TARTRATE 25 MG: 25 TABLET, FILM COATED ORAL at 08:46

## 2022-04-21 RX ADMIN — LOSARTAN POTASSIUM 50 MG: 50 TABLET, FILM COATED ORAL at 08:46

## 2022-04-21 RX ADMIN — Medication 5 ML: at 13:56

## 2022-04-21 RX ADMIN — BENZONATATE 200 MG: 200 CAPSULE ORAL at 13:56

## 2022-04-21 RX ADMIN — FERROUS SULFATE TAB 325 MG (65 MG ELEMENTAL FE) 325 MG: 325 (65 FE) TAB at 08:46

## 2022-04-21 RX ADMIN — Medication 60 MG: at 08:46

## 2022-04-21 RX ADMIN — Medication 5 ML: at 01:39

## 2022-04-21 RX ADMIN — PANTOPRAZOLE SODIUM 40 MG: 40 TABLET, DELAYED RELEASE ORAL at 08:52

## 2022-04-21 RX ADMIN — FUROSEMIDE 20 MG: 20 TABLET ORAL at 08:46

## 2022-04-21 ASSESSMENT — ENCOUNTER SYMPTOMS
EYES NEGATIVE: 1
APNEA: 0
SHORTNESS OF BREATH: 0
VOMITING: 0
CHEST TIGHTNESS: 0
CHOKING: 0
RESPIRATORY NEGATIVE: 1
WHEEZING: 0
STRIDOR: 0
ALLERGIC/IMMUNOLOGIC NEGATIVE: 1
ABDOMINAL DISTENTION: 0
NAUSEA: 0
ABDOMINAL PAIN: 1
DIARRHEA: 0
COUGH: 0
CONSTIPATION: 0
COLOR CHANGE: 1

## 2022-04-21 ASSESSMENT — PAIN DESCRIPTION - ORIENTATION
ORIENTATION: RIGHT
ORIENTATION: RIGHT

## 2022-04-21 ASSESSMENT — PAIN SCALES - GENERAL
PAINLEVEL_OUTOF10: 8
PAINLEVEL_OUTOF10: 4
PAINLEVEL_OUTOF10: 4
PAINLEVEL_OUTOF10: 8

## 2022-04-21 ASSESSMENT — PAIN DESCRIPTION - LOCATION
LOCATION: ABDOMEN
LOCATION: RIB CAGE

## 2022-04-21 ASSESSMENT — PAIN DESCRIPTION - FREQUENCY
FREQUENCY: INTERMITTENT
FREQUENCY: INTERMITTENT

## 2022-04-21 ASSESSMENT — PAIN DESCRIPTION - PAIN TYPE: TYPE: ACUTE PAIN

## 2022-04-21 ASSESSMENT — PAIN DESCRIPTION - DESCRIPTORS
DESCRIPTORS: SHARP
DESCRIPTORS: SHARP
DESCRIPTORS: ACHING

## 2022-04-21 NOTE — PROGRESS NOTES
Pulmonary Progress Note  [facility]      Patient - Carole Cuadra,  Age - 59 y.o.    - 1960      Room Number - 2123/2123-01   N -  [id number]   [facility]  Date of Admission -  2022  5:31 PM        Soraya Corley MD  Primary Care Physician - Fiorella Bond MD     SUBJECTIVE   Pt states he is feeling well. No difficulties breathing. Does not c/o dyspnea with exertion. Is eating, drinking, urinating, and stooling normally. He still has pain with cough and is coughing about the same. He is ready to be discharged. OBJECTIVE   VITALS    height is 6' 1\" (1.854 m) and weight is 299 lb 13.2 oz (136 kg). His oral temperature is 98.7 °F (37.1 °C). His blood pressure is 133/82 and his pulse is 93. His respiration is 18 and oxygen saturation is 93%. Body mass index is 39.56 kg/m². Temperature Range: Temp: 98.7 °F (37.1 °C) Temp  Av.4 °F (36.9 °C)  Min: 97.4 °F (36.3 °C)  Max: 99.7 °F (37.6 °C)  BP Range:  Systolic (99YLQ), PMQ:772 , Min:101 , RANJIT:984     Diastolic (18FAD), ORS:63, Min:77, Max:85    Pulse Range: Pulse  Av  Min: 86  Max: 101  Respiration Range: Resp  Av.7  Min: 16  Max: 18  Current Pulse Ox[de-identified]  SpO2: 93 %  24HR Pulse Ox Range:  SpO2  Av.2 %  Min: 90 %  Max: 96 %  Oxygen Amount and Delivery: O2 Flow Rate (L/min): 3 L/min    Wt Readings from Last 3 Encounters:   22 299 lb 13.2 oz (136 kg)   04/15/22 292 lb (132.5 kg)   22 287 lb 3.2 oz (130.3 kg)       I/O (24 Hours)    Intake/Output Summary (Last 24 hours) at 2022 1120  Last data filed at 2022 1116  Gross per 24 hour   Intake 440 ml   Output --   Net 440 ml       EXAM     General Appearance  Awake, alert, oriented, in no acute distress  HEENT - normocephalic, atraumatic.  []  Mallampati  [x] Crowded airway   [x] Macroglossia  []  Retrognathia  [] Micrognathia  []  Normal tongue size [] Normal Bite  [] [place] sign positive    Neck - Supple,  trachea midline   Lungs - CTA. No wheezes, no rales, no rhonchi. No retractions. No accessory muscle use. Cardiovascular - Heart sounds are normal.  Regular rate and rhythm   Abdomen - Tenderness moving to right lateral side of abdomen, closer to bruising. Soft, nondistended, no masses or organomegaly  Neurologic - There are no focal motor or sensory deficits  Skin - Large red and purple bruise extending R lateral abdomen and flank. Increased yellow/healing bruising around the periphery. No increase in size from yesterday. Healing purple/yellow baseball size bruise at epigastric area.  No bleeding  Extremities - No clubbing, cyanosis, edema     MEDS      pantoprazole  40 mg Oral QAM AC    sennosides-docusate sodium  2 tablet Oral BID    furosemide  20 mg Oral Daily    benzonatate  200 mg Oral TID    dextromethorphan  60 mg Oral 2 times per day    ferrous sulfate  325 mg Oral Every Other Day    metoprolol tartrate  25 mg Oral BID    losartan  50 mg Oral Daily    sodium chloride flush  5-40 mL IntraVENous 2 times per day      sodium chloride       oxyCODONE-acetaminophen, HYDROcodone-homatropine, sodium chloride flush, sodium chloride flush, sodium chloride, ondansetron **OR** ondansetron, acetaminophen    LABS   CBC   Recent Labs     04/21/22  0429   WBC 11.1*   HGB 7.7*   HCT 23.3*   MCV 84.0        BMP:   Lab Results   Component Value Date     04/21/2022    K 3.9 04/21/2022    CL 99 04/21/2022    CO2 30 04/21/2022    BUN 13 04/21/2022    LABALBU 3.9 04/16/2022    CREATININE 0.73 04/21/2022    CALCIUM 8.0 04/21/2022    GFRAA >60 04/21/2022    LABGLOM >60 04/21/2022     ABGs:No results found for: PHART, PO2ART, UST2VHE   Lab Results   Component Value Date    MODE NOT REPORTED 04/19/2021     Ionized Calcium:  No results found for: IONCA  Magnesium:  No results found for: MG  Phosphorus:  No results found for: PHOS     LIVER PROFILE No results for input(s): AST, ALT, LIPASE, BILIDIR, BILITOT, ALKPHOS in the last 72 hours. Invalid input(s): AMYLASE,  ALB  INR No results for input(s): INR in the last 72 hours. PTT   Lab Results   Component Value Date    APTT 31.8 04/16/2022         RADIOLOGY     (See actual reports for details)    ASSESSMENT/PLAN   Principal Problem:    Pleural effusion  Active Problems:    Primary hypertension    Iron deficiency anemia    Acute blood loss anemia    Rectus sheath hematoma    Acute respiratory failure with hypoxia (HCC)  Resolved Problems:    ISA (acute kidney injury) ([facility] [address]  Plan  Pt okay for discharge. Instructed to return with any signs of anemia e.g. extreme fatigue, chest pain, or SOB  Per surgery hold Plavix and ASA for next couple weeks  Follow up with pulmonology as outpatient    Electronically signed by BRYSON Fairchild on 4/21/2022 at 11:20 AM    Patient seen and examined independently by me. Above discussed and I agree with medical student note except where indicated in the EMR revision history. Also see my additional comments and changes indicated by discrete font, text color, italics, and/or initials. Labs, cultures, and radiographs where available were reviewed. Surgical pathology negative for malignancy as is flow cytometry, thus I feel his pleural effusion and his rectus hematoma are related to his coughing spells. I would discharge on Delsym cough syrup and Tessalon Perles and have him take it scheduled. He will need follow-up in the office in 4 to 6 weeks I also recommend an x-ray at that time. Finally, he needs work-up for obstructive sleep apnea.   Electronically signed by Yolanda Quiñones MD on 4/21/2022 at 1:45 PM

## 2022-04-21 NOTE — PROGRESS NOTES
[facility]    PROGRESS NOTE             4/21/2022    7:20 AM    Name:   Susan Laurent  MRN:     [id number]     Acct:      [de-identified]   Room:   [address] [place] [address] Day:  5  Admit Date:  4/16/2022  5:31 PM    PCP:  Isabella Austin MD  Code Status:  Full Code    Subjective:     C/C:   Chief Complaint   Patient presents with    Cough     Interval History Status: improved. Patient was seen and examined at bedside. No acute events overnight. Vital signs stable. Has not received fentanyl since 9:45 PM on 4/20/2022, discontinued this morning. Patient states his pain is tolerable. Per general surgery, aspirin, Plavix to be held for the next couple of weeks. Patient will follow up outpatient with cardiology for further recommendations on restarting antiplatelet therapy. Patient to follow with pulmonology outpatient for follow-up on fluid analysis.     Brief History:     The patient is R 8 y.o.  Non- / non  male who presents withCough   and he is admitted to the hospital for the management of right pleural effusion.     Patient reports that he was home for the last week, and had recently caught a cold from his wife, had a brief viral illness with cough that persisted.  Patient reports that he then felt ill, and developed cough, which time he had coughing fits last week when he would cough strongly for hours.  Patient reports that on Friday he did go to see his outpatient doctor in the office but then presented to the emergency department the following day for extreme chest pain, shortness of breath.  He was found to be hypotensive in the emergency department and was bolused 2 L of fluid.  Met SIRS criteria 2 out of 4 with tachycardia, white blood cell count, and did have low blood pressure 61/51 on presentation.     Initial work-up for sepsis was performed, blood cultures drawn, fluid bolus given, maintenance fluids given, imaging showed large right pleural effusion, right rectus sheath hematoma.      Pro-Taz was slightly elevated, however given imaging, pulmonology does not feel strongly about any antibiotics at this time.  Patient is from home and does not have any predisposing factors for Pseudomonas infection     Patient is saturating mid 90s on 2 L nasal cannula, does not use home oxygen.  Possible history of obstructive sleep apnea, ported the patient had an home sleep apnea study which was negative.      Patient reports today he is doing fine, slightly better than yesterday, however reports he is still coughing quite a bit which causes him a lot of right-sided discomfort     4/17: Plan today is possible bedside thoracentesis by pulmonology, IR has been consulted for possible hematoma evacuation.  Discontinued antibiotics due to low suspicion for bacterial cause of pleural effusion.     4/18: Dx thoracentesis performed     4/19: exudative thoracentesis, pending further results     4/20: Continue pain management, continue antitussives, continue to monitor hemoglobin. Awaiting results of body fluid analysis from thoracentesis. 4/21: Continue to hold aspirin Plavix per general surgery, patient to follow-up with cardiology outpatient for further recommendations on restarting these medications due to history of multiple stents in September 2021; discharged today. Follow with pulmonology for final results of fluid analysis. Review of Systems:     Review of Systems   Constitutional: Negative. Negative for chills, diaphoresis, fatigue and fever. HENT: Negative. Eyes: Negative. Respiratory: Negative. Negative for apnea, cough, choking, chest tightness, shortness of breath, wheezing and stridor. Cardiovascular: Negative. Negative for chest pain, palpitations and leg swelling. Gastrointestinal: Positive for abdominal pain (Right-sided due to hematoma).  Negative for abdominal distention, constipation, diarrhea, nausea and vomiting. Endocrine: Negative. Genitourinary: Negative. Musculoskeletal: Negative. Skin: Positive for color change (Bruising to right thorax, abdomen). Allergic/Immunologic: Negative. Neurological: Negative. Hematological: Negative. Psychiatric/Behavioral: Negative. Medications: Allergies:  No Known Allergies    Current Meds:   Scheduled Meds:    pantoprazole  40 mg Oral QAM AC    sennosides-docusate sodium  2 tablet Oral BID    furosemide  20 mg Oral Daily    benzonatate  200 mg Oral TID    dextromethorphan  60 mg Oral 2 times per day    ferrous sulfate  325 mg Oral Every Other Day    metoprolol tartrate  25 mg Oral BID    losartan  50 mg Oral Daily    sodium chloride flush  5-40 mL IntraVENous 2 times per day     Continuous Infusions:    sodium chloride       PRN Meds: HYDROcodone-homatropine, oxyCODONE-acetaminophen, sodium chloride flush, sodium chloride flush, sodium chloride, ondansetron **OR** ondansetron, fentanNYL, acetaminophen    Data:     Past Medical History:   has a past medical history of CAD (coronary artery disease), Cerebral artery occlusion with cerebral infarction ([facility] CHF (congestive heart failure) ([facility] Hx of heart artery stent, Hyperlipidemia, Hypertension, and MI (myocardial infarction) ([facility] Social History:   reports that he quit smoking about 35 years ago. His smoking use included cigarettes. He started smoking about 47 years ago. He has a 30.00 pack-year smoking history. He has never used smokeless tobacco. He reports current alcohol use. He reports that he does not use drugs.      Family History:   Family History   Problem Relation Age of Onset    Stroke Mother     Heart Disease Mother     Heart Disease Father     Diabetes Father     Stroke Father        Vitals:  /82   Pulse 93   Temp 98.7 °F (37.1 °C) (Oral)   Resp 18   Ht 6' 1\" (1.854 m)   Wt 299 lb 13.2 oz (136 kg)   SpO2 93%   BMI 39.56 kg/m²   Temp (24hrs), Av.4 °F (36.9 °C), Min:97.4 °F (36.3 °C), Max:99.7 °F (37.6 °C)    No results for input(s): POCGLU in the last 72 hours. I/O(24Hr): No intake or output data in the 24 hours ending 22 0720    Labs:  [unfilled]    No results found for: SPECIAL  Lab Results   Component Value Date/Time    CULTURE NO GROWTH 2 DAYS 2022 03:30 PM       [facility]    Radiology:    ECHO Complete 2D W Doppler W Color    Result Date: 2022  [facility] [facility] Transthoracic Echocardiography Report (TTE)  Patient Name  Suhail Kramer    Date of Study               2022               Sully Florentino   Date of      1960  Gender                      Male  Birth   Age          59 year(s)  Race                           Room Number  2006        Height:                     73 inch, 185.42 cm   Corporate ID [id number]    Weight:                     300 pounds, 136.1 kg  #   Patient Acct [de-identified]   BSA:          2.56 m^2      BMI:      39.58  #                                                              kg/m^2   MR #         [id number]      Sonographer                 Chanel Guan   Accession #  [id number]  Interpreting Physician      Hammad Nieto 59   Fellow                   Referring Nurse                           Practitioner   Interpreting             Referring Physician         Andres Bundy,  Fellow  Additional Comments Technically difficult study. Type of Study   TTE procedure:2D Echocardiogram, M-Mode, Doppler, Color Doppler, Contrast  study. Procedure Date Date: 2022 Start: 10:01 AM Study Location: [address] [place] [address] Technical Quality: Limited visualization due to body habitus. Indications:Dyspnea/SOB and Pleural effusion. History / Tech. Comments: CAD, CHF, Stents x6, HLD, HTN, MI Patient Status: Inpatient Contrast Medium: Definity.  Amount - 3 ml Height: 73 inches Weight: 300.01 pounds BSA: 2.56 m^2 BMI: 39.58 kg/m^2 Rhythm: Sinus tachycardia HR: 102 bpm BP: 152/107 mmHg Allergies - *No Known Allergies. CONCLUSIONS Summary Technically difficult study Left ventricle is normal in size. Mild left ventricular hypertrophy. Normal left ventricular systolic function. Estimated LV EF 55 %. Mild Apical hypokinesis. Left atrium is normal in size. Normal right ventricular size and function. No significant valvular regurgitation or stenosis seen. No significant pericardial effusion is seen. Aortic root dimension is at upper limit of normal. IVC not well visualized Signature ----------------------------------------------------------------------------  Electronically signed by Prieto Oneill(Interpreting physician) on  04/18/2022 03:46 PM ---------------------------------------------------------------------------- ----------------------------------------------------------------------------  Electronically signed by Chanel Guan(Sonographer) on 04/18/2022 12:06  PM ---------------------------------------------------------------------------- FINDINGS Left Atrium Left atrium is normal in size. Left Ventricle Left ventricle is normal in size. Mild left ventricular hypertrophy. Normal left ventricular systolic function. Estimated LV EF 55 %. Apical hypokinesis. Right Atrium Right atrium is normal in size. Right Ventricle Normal right ventricular size and function. Mitral Valve No obvious valvular abnormality seen. No evidence of mitral regurgitation. Aortic Valve No obvious valvular abnormality seen. No evidence of aortic insufficiency or stenosis. Tricuspid Valve Tricuspid valve was not well visualized. Insignificant tricuspid regurgitation, unable to estimate RVSP. Pulmonic Valve Pulmonic valve was not well visualized. No evidence of pulmonic insufficiency or stenosis. Pericardial Effusion No significant pericardial effusion is seen. Pleural Effusion No pleural effusion seen. Miscellaneous Aortic root dimension is at upper limit of normal. IVC not well visualized.  M-mode / 2D Measurements & Calculations: LVIDd:4.26 cm(3.7 - 5.6 cm)      Diastolic DABECS:25.9 ml  CGMKH:8.9 cm(2.2 - 4.0 cm)       Systolic JKVHQK:60 ml  SPGP:9.74 cm(0.6 - 1.1 cm)       Aortic Root:3.8 cm(2.0 - 3.7 cm)  LVPWd:1.18 cm(0.6 - 1.1 cm)      LA Dimension: 3.5 cm(1.9 - 4.0 cm)  Fractional Shortenin.27 %    LA volume/Index: 86.5 ml /34m^2  Calculated LVEF (%): 71.54 %     LVOT:2.7 cm   Mitral:                              Aortic   Peak E-Wave: 0.77 m/s                Peak Velocity: 1.46 m/s  Peak A-Wave: 1.16 m/s                Mean Velocity: 1.01 m/s  E/A Ratio: 0.66                      Peak Gradient: 8.53 mmHg  Peak Gradient: 2.35 mmHg             Mean Gradient: 5 mmHg  Deceleration Time: 197 msec                                        Area (continuity): 4.62 cm^2                                       AV VTI: 23.8 cm  Septal Wall E' velocity:0.05 m/s Lateral Wall E' velocity:0.13 m/s    CT ABDOMEN PELVIS WO CONTRAST Additional Contrast? None    Result Date: 2022  EXAMINATION: CT OF THE ABDOMEN AND PELVIS WITHOUT CONTRAST 2022 8:39 am TECHNIQUE: CT of the abdomen and pelvis was performed without the administration of intravenous contrast. Multiplanar reformatted images are provided for review. Dose modulation, iterative reconstruction, and/or weight based adjustment of the mA/kV was utilized to reduce the radiation dose to as low as reasonably achievable. COMPARISON: CTA of the chest, abdomen, and pelvis 2022 HISTORY: ORDERING SYSTEM PROVIDED HISTORY: abdominal destenion and rule out bleed TECHNOLOGIST PROVIDED HISTORY: abdominal destenion and rule out bleed Reason for Exam: abdominal destenion, pt states abdominal pain FINDINGS: Lower Chest: Similar large right pleural effusion with atelectasis of the right lower lobe. Partially visualized patchy consolidative and ground-glass opacities in the left upper lobe abutting the major fissure. 3 vessel coronary artery calcification. No pericardial effusion.  Organs: Evaluation is limited by the absence of contrast.  Minimally increased attenuation of the bile in the gallbladder, possibly due to vicarious excretion of contrast.  Unenhanced liver, spleen, pancreas, adrenal glands, and kidneys are without acute abnormality. 13 mm indeterminate (50 HU) lesion in the posterior upper pole right kidney. GI/Bowel: No bowel obstruction. Colonic diverticulosis without diverticulitis. Appendix is not definitively identified. Pelvis: Mild bladder wall prominence which may be due to lack of distension. Coarse calcifications within the normal caliber prostate. Seminal vesicles are grossly symmetric. Peritoneum/Retroperitoneum: No free fluid, free air, or lymphadenopathy. Aortoiliac and femoral atherosclerosis without aneurysm. Right femoral central venous catheter terminates in the proximal right external iliac vein. Bones/Soft Tissues: No significant change in size in the large right rectus sheath hematoma spanning roughly 16 cm in both transverse and craniocaudal dimensions and approximately 5 cm in AP dimension. No significant change in the asymmetric enlargement of the right serratus anterior muscle. There is increased overlying subcutaneous fat stranding along the right rectus and right lateral flank extending along the oblique musculature and extending into the upper visualized portion of the right lateral hip. Subcutaneous fat stranding is also present along the left lateral flank and upper lateral hip, though to a far lesser degree. No acute bony findings. No significant change in size of the large right rectus sheath hematoma or of the asymmetric enlargement of the right serratus anterior muscle, though there is increase in the overlying subcutaneous fat stranding, as well as new fat stranding to a lesser degree along the left lateral flank.  13 mm indeterminate lesion in the posterior upper pole left kidney, possibly hemorrhagic or proteinaceous cyst.  This could be characterized combination of effusion, atelectasis and or consolidation. Cardiomegaly is noted. Vascular markings are distinct. No pneumothorax noted. No acute osseous abnormalities present. 1. Large right pleural effusion 2. Right basilar opacification, which may be due to a combination of atelectasis, effusion and or consolidation. CTA CHEST ABDOMEN PELVIS W CONTRAST    Result Date: 4/16/2022  EXAMINATION: CTA OF THE CHEST, ABDOMEN AND PELVIS WITH CONTRAST 4/16/2022 3:30 pm: TECHNIQUE: CTA of the chest, abdomen and pelvis was performed after the administration of intravenous contrast.  Multiplanar reformatted images are provided for review. MIP images are provided for review. Dose modulation, iterative reconstruction, and/or weight based adjustment of the mA/kV was utilized to reduce the radiation dose to as low as reasonably achievable. COMPARISON: Chest x-ray dated April 16, 2022 HISTORY: ORDERING SYSTEM PROVIDED HISTORY: hypotension, Chest pain, r/o disection TECHNOLOGIST PROVIDED HISTORY: hypotension, Chest pain, r/o disection Decision Support Exception - unselect if not a suspected or confirmed emergency medical condition->Emergency Medical Condition (MA) Reason for Exam: hypotension, Chest pain, r/o disection Relevant Medical/Surgical History: chf, stents, cardiac cath, appendectomy FINDINGS: CTA CHEST: Thoracic aorta: No evidence of thoracic aortic aneurysm or dissection. No acute abnormality of the aorta. Mediastinum: No adenopathy is present. Normal enhancement of the pulmonary arterial system is present. Extensive coronary arterial calcifications are noted. Small pericardial effusion is present. Fatty changes are present involving the apex of the left ventricle, related to prior infarct. Lungs/Pleura: A large right pleural effusion is present, with compressive atelectasis involving the right lower lobe. Superimposed infection is difficult to exclude.   The trachea and mainstem bronchi appear normal. Paraseptal emphysema is present within the right apex. Minimal patchy airspace disease is present within the left upper lobe, likely infectious in etiology. Soft Tissues/Bones: No acute osseous abnormality is present involving the thorax. Asymmetric enlargement of the right serratus anterior muscle is present, compared to the left. This may represent intramuscular hematoma or inflammation/myositis. CTA ABDOMEN: Abdominal aorta/Branches: Normal enhancement of the abdominal aorta and branch vessels is present without evidence of aneurysm formation or dissection. Organs: Hepatic steatosis is present. The gallbladder, pancreas, spleen, adrenal glands, and kidneys demonstrate no acute abnormality. GI/Bowel: Stomach appears normal.  Small bowel appears normal without evidence of obstruction. No evidence of appendicitis is present. Scattered colonic diverticula, without evidence of diverticulitis. Peritoneum/Retroperitoneum: No adenopathy is present. No free fluid is present within the abdomen. No free air is noted. Bones/Soft Tissues: No acute osseous abnormality is present involving the abdomen. Marked enlargement of the right rectus sheath and rectus abdominus muscle is present, consistent with a large rectus sheath hematoma. Hematoma extends superiorly, along the anterolateral right chest wall, likely involving the serrated anterior muscle is well. Right rectus sheath hematoma measures approximately 15-16 cm in transverse dimension, 4-5 cm in AP dimension, and approximately 16 cm in cephalocaudal dimension. No evidence of active bleeding is present. Soft tissue stranding is present throughout the subcutaneous fat of the right flank and anterior abdominal wall. CTA PELVIS: Aorta/Iliacs: Normal enhancement of the aortic bifurcation, common iliac, internal and external iliac arteries common femoral arteries is noted bilaterally without evidence of aneurysm formation or dissection.  Other: Urinary bladder is nondistended. Prostate gland is normal size. No free fluid or free air is present within the pelvis. Bones/Soft Tissues: No acute osseous abnormality is present involving the pelvis. A small umbilical hernia is present containing fat. 1. No evidence of dissection, aneurysm formation, or intramural hematoma formation involving the thoracic or abdominal aorta 2. Large right pleural effusion, with atelectasis involving the right lower lobe. Superimposed infection is difficult to exclude 3. Large right rectus sheath hematoma, measuring approximately 15-16 cm in transverse dimension, 4-5 cm in AP dimension, and approximately 16 cm in cephalocaudal dimension 4. Asymmetric enlargement of the right serratus anterior muscle alyce may related to intramuscular hematoma formation, or myositis. 5.  Critical results were called by Dr. Violet Lakhani to Dr. Jessica Bowie on 4/16/2022 at 7:11 p.m. hours. US THORACENTESIS Which side should the procedure be performed? Right    Result Date: 4/18/2022  PROCEDURE: ULTRASOUND CHEST FOR THORACENTESIS 4/18/2022 HISTORY: ORDERING SYSTEM PROVIDED HISTORY: Pleural effusion TECHNOLOGIST PROVIDED HISTORY: Pleural effusion Which side should the procedure be performed?->Right FINDINGS: Ultrasound right chest shows a small pleural effusion. Fluid was localized for subsequent thoracentesis performed by Dr. Wyatt Norman. Please correlate with procedure report for additional details. Small right pleural effusion localized for thoracentesis. Physical Examination:        Physical Exam  Vitals and nursing note reviewed. Constitutional:       General: He is not in acute distress. Appearance: Normal appearance. He is obese. He is not ill-appearing, toxic-appearing or diaphoretic. HENT:      Head: Normocephalic and atraumatic. Nose: Nose normal.      Mouth/Throat:      Mouth: Mucous membranes are moist.   Eyes:      Extraocular Movements: Extraocular movements intact. Cardiovascular:      Rate and Rhythm: Normal rate and regular rhythm. Heart sounds: Normal heart sounds. No murmur heard. No friction rub. No gallop. Pulmonary:      Effort: Pulmonary effort is normal. No respiratory distress. Breath sounds: No stridor. No wheezing, rhonchi or rales. Comments: Diminished breath sounds bilateral lung bases, likely due to body habitus, positioning, and recent large pleural effusion with rectus sheath hematoma resulting in pain    Chest:      Chest wall: No tenderness. Abdominal:      General: Abdomen is flat. Bowel sounds are normal. There is no distension. Palpations: Abdomen is soft. There is no mass. Tenderness: There is no abdominal tenderness. There is no guarding or rebound. Hernia: No hernia is present. Musculoskeletal:      Cervical back: Normal range of motion. Skin:     General: Skin is warm and dry. Findings: Bruising (Right thorax, extending beyond the initial margins delineated, borders are improving, yellowing) present. Neurological:      General: No focal deficit present. Mental Status: He is alert. Psychiatric:         Mood and Affect: Mood normal.         Behavior: Behavior normal.         Thought Content: Thought content normal.         Judgment: Judgment normal.      Comments: Looking forward to being discharged today.            Assessment:        Primary Problem  Pleural effusion    Active Hospital Problems    Diagnosis Date Noted    [facility] [J96.01] 04/19/2022    Iron deficiency anemia [D50.9] 04/18/2022    Acute blood loss anemia [D62] 04/18/2022    Rectus sheath hematoma [S30.1XXA] 04/18/2022    Primary hypertension [I10] 04/17/2022    Pleural effusion [J90] 04/16/2022       Plan:        Severe sepsis with right pleural effusion secondary to severe cough, likely due to viral pneumonia versus recent change to losartan from lisinopril; Septic shock ruled out due to responsiveness of hypotension to fluid bolus -resolved  -Initial blood pressure 61/51, responded to 2 L fluid bolus  -initial lactic acid 2.8, trended to 2.4  -WBCs 21.3>13.5>11.2>9.7  -temperature on admission 97.8  -RR on admission 18  -HR on admission 118  -bedside thoracentesis per pulm 4/18  -Antitussives, pain management  -Procalcitonin 0.11  -Cefepime, vancomycin discontinued due to marginally elevated Pro-Taz, unlikely bacterial cause of sepsis  -Blood cultures x2 no growth to date  -MRSA nasal swab negative  -Influenza A, B, not detected  -SARS-CoV-2 not detected  -Strep pneumo negative  -Viral respiratory panel negative for viruses tested  -Urinalysis and urine culture obtained, pending  -proBNP 253  -Saturating low 90s, room air  -exudative pleural effusion, NGTD  -Continue incentive spirometry.       Right rectus sheath hematoma  -Continue to monitor  -IR, gen surg consulted, no intervention  -Antitussives, pain management  -Percocet 5-325 every 6 hours as needed      Combination acute blood loss, dilutional, and iron deficiency anemia  -Hemoglobin 10.7>8.3>8.0>7.9>7.7>7.7>7.3>7.5>7.7  -iron studies showed KIMI  -ferrous sulfate started QOD  -Transfuse if hemoglobin less than 7     Possible DELLA  -Pulmonology recommends outpatient sleep lab study  -room air, saturating low 90s     hypertension, well-controlled  -Home Cozaar 50 mg p.o. daily, Lopressor 25 mg p.o. twice daily started     History of multiple cardiac stents  -On Plavix at home, hold d/t active bleed for next few weeks per general surgery  -pt to follow w/ cardiology OP for further recommendations     Diet  -Regular, low fiber diet     DVT prophylaxis  -Pneumatic compression devices  -Hold Lovenox due hematoma and anemia     GI prophylaxis  -Protonix     Disposition: Discharge to home today with outpatient follow-up with cardiology for restarting ASA, plavix; follow w/ pulmonology for final results fluid analysis.     Dede Diaz,

## 2022-04-21 NOTE — PROGRESS NOTES
CLINICAL PHARMACY NOTE: MEDS TO BEDS    Total # of Prescriptions Filled: 2   The following medications were delivered to the patient:  · Benzonatate 200mg  · Percocet 5/325mg    Additional Documentation:  OTC(s) Not Covered + Profiled Rx(s)   - Iron   - Delsym

## 2022-04-21 NOTE — PLAN OF CARE
Problem: Pain:  Goal: Pain level will decrease  Description: Pain level will decrease  Outcome: Completed  Goal: Control of acute pain  Description: Control of acute pain  Outcome: Completed  Goal: Control of chronic pain  Description: Control of chronic pain  Outcome: Completed     Problem: Falls - Risk of:  Goal: Will remain free from falls  Description: Will remain free from falls  Outcome: Completed  Goal: Absence of physical injury  Description: Absence of physical injury  Outcome: Completed     Problem: Discharge Planning:  Goal: Discharged to appropriate level of care  Description: Discharged to appropriate level of care  Outcome: Completed  Goal: Participates in care planning  Description: Participates in care planning  Outcome: Completed     Problem: Gas Exchange - Impaired:  Goal: Levels of oxygenation will improve  Description: Levels of oxygenation will improve  Outcome: Completed     Problem: Infection, Septic Shock:  Goal: Will show no infection signs and symptoms  Description: Will show no infection signs and symptoms  Outcome: Completed     Problem: Skin Integrity:  Goal: Will show no infection signs and symptoms  Description: Will show no infection signs and symptoms  Outcome: Completed  Goal: Absence of new skin breakdown  Description: Absence of new skin breakdown  Outcome: Completed     Problem: Discharge Planning  Goal: Discharge to home or other facility with appropriate resources  Outcome: Completed

## 2022-04-21 NOTE — PROGRESS NOTES
Patient seen and examined. Afebrile, VSS. Mild increase in WBC but not significant, hemoglobin has been stable. Hgb 7.7 this morning. Patient is doing well. Denies abdominal pain unless he coughs. Bowels are moving. Tolerating diet, no N/V. Abdomen soft, non-distended. Tender over right abdominal wall. Abdominal/right flank ecchymosis is stable. Surgically stable for discharge. Hold ASA/Plavix; patient to follow up as outpatient with cardiology in regards to restarting anticoagulation. Continue medical management.      Tammi Salinas PA-C  [address] [facility]

## 2022-04-21 NOTE — DISCHARGE SUMMARY
[address]    Discharge Summary     Patient ID: Nader Weathers  :  1960   MRN: [id number]     ACCOUNT:  [de-identified]   Patient's PCP: Brooke Josue MD  Admit Date: 2022   Discharge Date: 2022     Length of Stay: 5  Code Status:  Full Code  Admitting Physician: Adarsh Ayala MD  Discharge Physician: Coni Flores MD     Active Discharge Diagnoses:       Primary Problem  Pleural effusion      [place] Problems    Diagnosis Date Noted    Acute respiratory failure with hypoxia ([address] [J96.01] 2022    Iron deficiency anemia [D50.9] 2022    Acute blood loss anemia [D62] 2022    Rectus sheath hematoma [S30.1XXA] 2022    Primary hypertension [I10] 2022    Pleural effusion [J90] 2022       Admission Condition:  poor     Discharged Condition: stable    Hospital Stay:       Hospital Course:  Nader Weathers is a 59 y.o. male who was admitted for the management of   Pleural effusion , presented to ER with Cough       The patient is B 1 y.o.  Non- / non  male who presents withCough   and he is admitted to the hospital for the management of right pleural effusion.     Patient reports that he was home for the last week, and had recently caught a cold from his wife, had a brief viral illness with cough that persisted.  Patient reports that he then felt ill, and developed cough, which time he had coughing fits last week when he would cough strongly for hours.  Patient reports that on Friday he did go to see his outpatient doctor in the office but then presented to the emergency department the following day for extreme chest pain, shortness of breath.  He was found to be hypotensive in the emergency department and was bolused 2 L of fluid.  Met SIRS criteria 2 out of 4 with tachycardia, white blood cell count, and did have low blood pressure 61/51 on presentation.     Initial work-up for sepsis was performed, blood cultures drawn, fluid bolus given, maintenance fluids given, imaging showed large right pleural effusion, right rectus sheath hematoma.      Pro-Taz was slightly elevated, however given imaging, pulmonology does not feel strongly about any antibiotics at this time.  Patient is from home and does not have any predisposing factors for Pseudomonas infection     Patient is saturating mid 90s on 2 L nasal cannula, does not use home oxygen.  Possible history of obstructive sleep apnea, ported the patient had an home sleep apnea study which was negative.      Patient reports today he is doing fine, slightly better than yesterday, however reports he is still coughing quite a bit which causes him a lot of right-sided discomfort     4/17: Plan today is possible bedside thoracentesis by pulmonology, IR has been consulted for possible hematoma evacuation.  Discontinued antibiotics due to low suspicion for bacterial cause of pleural effusion.     4/18: Dx thoracentesis performed     4/19: exudative thoracentesis, pending further results     4/20: Continue pain management, continue antitussives, continue to monitor hemoglobin.  Awaiting results of body fluid analysis from thoracentesis.     4/21: Continue to hold aspirin Plavix per general surgery, patient to follow-up with cardiology outpatient for further recommendations on restarting these medications due to history of multiple stents in September 2021; discharged today. Follow with pulmonology for final results of fluid analysis.       Significant therapeutic interventions: sepsis work up, supplemental O2, thoracentesis    Significant Diagnostic Studies:   Labs / Micro:  CBC:   Lab Results   Component Value Date    WBC 11.1 04/21/2022    RBC 2.78 04/21/2022    HGB 7.7 04/21/2022    HCT 23.3 04/21/2022    MCV 84.0 04/21/2022    MCH 27.7 04/21/2022    MCHC 33.0 04/21/2022    RDW 13.6 04/21/2022     04/21/2022     BMP:    Lab Results   Component Value Date    GLUCOSE 102 04/21/2022     04/21/2022    K 3.9 04/21/2022    CL 99 04/21/2022    CO2 30 04/21/2022    ANIONGAP 7 04/21/2022    BUN 13 04/21/2022    CREATININE 0.73 04/21/2022    BUNCRER NOT REPORTED 04/19/2021    CALCIUM 8.0 04/21/2022    LABGLOM >60 04/21/2022    GFRAA >60 04/21/2022    GFR      04/21/2022         Radiology:    ECHO Complete 2D W Doppler W Color    Result Date: 4/18/2022  [facility] Transthoracic Echocardiography Report (TTE)  Patient Name 2021 Suhail Kramer    Date of Study               04/18/2022               Jaqueline Lundberg   Date of      1960  Gender                      Male  Birth   Age          59 year(s)  Race                           Room Number  2006        Height:                     73 inch, 185.42 cm   Corporate ID [id number]    Weight:                     300 pounds, 136.1 kg  #   Patient Acct [de-identified]   BSA:          2.56 m^2      BMI:      39.58  #                                                              kg/m^2   MR #         [id number]      Sonographer                 Chanel Guan   Accession #  [id number]  Interpreting Physician      Hammad Nieto 59   Fellow                   Referring Nurse                           Practitioner   Interpreting             Referring Physician         Severa Livings,  Fellow  Additional Comments Technically difficult study. Type of Study   TTE procedure:2D Echocardiogram, M-Mode, Doppler, Color Doppler, Contrast  study. Procedure Date Date: 04/18/2022 Start: 10:01 AM Study Location: [address] [place] [address] Technical Quality: Limited visualization due to body habitus. Indications:Dyspnea/SOB and Pleural effusion. History / Tech. Comments: CAD, CHF, Stents x6, HLD, HTN, MI Patient Status: Inpatient Contrast Medium: Definity.  Amount - 3 ml Height: 73 inches Weight: 300.01 pounds BSA: 2.56 m^2 BMI: 39.58 kg/m^2 Rhythm: Sinus tachycardia HR: 102 bpm BP: 152/107 mmHg Allergies   - *No Known Allergies. CONCLUSIONS Summary Technically difficult study Left ventricle is normal in size. Mild left ventricular hypertrophy. Normal left ventricular systolic function. Estimated LV EF 55 %. Mild Apical hypokinesis. Left atrium is normal in size. Normal right ventricular size and function. No significant valvular regurgitation or stenosis seen. No significant pericardial effusion is seen. Aortic root dimension is at upper limit of normal. IVC not well visualized Signature ----------------------------------------------------------------------------  Electronically signed by Prieto Oneill(Interpreting physician) on  04/18/2022 03:46 PM ---------------------------------------------------------------------------- ----------------------------------------------------------------------------  Electronically signed by Chanel Guan(Sonographer) on 04/18/2022 12:06  PM ---------------------------------------------------------------------------- FINDINGS Left Atrium Left atrium is normal in size. Left Ventricle Left ventricle is normal in size. Mild left ventricular hypertrophy. Normal left ventricular systolic function. Estimated LV EF 55 %. Apical hypokinesis. Right Atrium Right atrium is normal in size. Right Ventricle Normal right ventricular size and function. Mitral Valve No obvious valvular abnormality seen. No evidence of mitral regurgitation. Aortic Valve No obvious valvular abnormality seen. No evidence of aortic insufficiency or stenosis. Tricuspid Valve Tricuspid valve was not well visualized. Insignificant tricuspid regurgitation, unable to estimate RVSP. Pulmonic Valve Pulmonic valve was not well visualized. No evidence of pulmonic insufficiency or stenosis. Pericardial Effusion No significant pericardial effusion is seen. Pleural Effusion No pleural effusion seen. Miscellaneous Aortic root dimension is at upper limit of normal. IVC not well visualized.  M-mode / 2D Measurements & Calculations:   LVIDd:4.26 cm(3.7 - 5.6 cm)      Diastolic NZXSHY:72.9 ml  KGXG.7 cm(2.2 - 4.0 cm)       Systolic LQBTMA:85 ml  RSCD:7.83 cm(0.6 - 1.1 cm)       Aortic Root:3.8 cm(2.0 - 3.7 cm)  LVPWd:1.18 cm(0.6 - 1.1 cm)      LA Dimension: 3.5 cm(1.9 - 4.0 cm)  Fractional Shortenin.27 %    LA volume/Index: 86.5 ml /34m^2  Calculated LVEF (%): 71.54 %     LVOT:2.7 cm   Mitral:                              Aortic   Peak E-Wave: 0.77 m/s                Peak Velocity: 1.46 m/s  Peak A-Wave: 1.16 m/s                Mean Velocity: 1.01 m/s  E/A Ratio: 0.66                      Peak Gradient: 8.53 mmHg  Peak Gradient: 2.35 mmHg             Mean Gradient: 5 mmHg  Deceleration Time: 197 msec                                        Area (continuity): 4.62 cm^2                                       AV VTI: 23.8 cm  Septal Wall E' velocity:0.05 m/s Lateral Wall E' velocity:0.13 m/s    CT ABDOMEN PELVIS WO CONTRAST Additional Contrast? None    Result Date: 2022  EXAMINATION: CT OF THE ABDOMEN AND PELVIS WITHOUT CONTRAST 2022 8:39 am TECHNIQUE: CT of the abdomen and pelvis was performed without the administration of intravenous contrast. Multiplanar reformatted images are provided for review. Dose modulation, iterative reconstruction, and/or weight based adjustment of the mA/kV was utilized to reduce the radiation dose to as low as reasonably achievable. COMPARISON: CTA of the chest, abdomen, and pelvis 2022 HISTORY: ORDERING SYSTEM PROVIDED HISTORY: abdominal destenion and rule out bleed TECHNOLOGIST PROVIDED HISTORY: abdominal destenion and rule out bleed Reason for Exam: abdominal destenion, pt states abdominal pain FINDINGS: Lower Chest: Similar large right pleural effusion with atelectasis of the right lower lobe. Partially visualized patchy consolidative and ground-glass opacities in the left upper lobe abutting the major fissure. 3 vessel coronary artery calcification. No pericardial effusion. Organs: Evaluation is limited by the absence of contrast.  Minimally increased attenuation of the bile in the gallbladder, possibly due to vicarious excretion of contrast.  Unenhanced liver, spleen, pancreas, adrenal glands, and kidneys are without acute abnormality. 13 mm indeterminate (50 HU) lesion in the posterior upper pole right kidney. GI/Bowel: No bowel obstruction. Colonic diverticulosis without diverticulitis. Appendix is not definitively identified. Pelvis: Mild bladder wall prominence which may be due to lack of distension. Coarse calcifications within the normal caliber prostate. Seminal vesicles are grossly symmetric. Peritoneum/Retroperitoneum: No free fluid, free air, or lymphadenopathy. Aortoiliac and femoral atherosclerosis without aneurysm. Right femoral central venous catheter terminates in the proximal right external iliac vein. Bones/Soft Tissues: No significant change in size in the large right rectus sheath hematoma spanning roughly 16 cm in both transverse and craniocaudal dimensions and approximately 5 cm in AP dimension. No significant change in the asymmetric enlargement of the right serratus anterior muscle. There is increased overlying subcutaneous fat stranding along the right rectus and right lateral flank extending along the oblique musculature and extending into the upper visualized portion of the right lateral hip. Subcutaneous fat stranding is also present along the left lateral flank and upper lateral hip, though to a far lesser degree. No acute bony findings. No significant change in size of the large right rectus sheath hematoma or of the asymmetric enlargement of the right serratus anterior muscle, though there is increase in the overlying subcutaneous fat stranding, as well as new fat stranding to a lesser degree along the left lateral flank.  13 mm indeterminate lesion in the posterior upper pole left kidney, possibly hemorrhagic or proteinaceous cyst.  This could be characterized at a clinically appropriate time with dedicated multiphase renal CT or MRI. Similar large right pleural effusion with atelectasis of the right lower lobe. Half partially visualized patchy consolidative and ground-glass opacities in the left upper lobe abutting the fissure, suspicious for possible superimposed infection/inflammation. XR CHEST PORTABLE    Result Date: 4/19/2022  EXAMINATION: ONE XRAY VIEW OF THE CHEST 4/19/2022 6:34 am COMPARISON: Chest radiograph performed 04/18/2022. HISTORY: ORDERING SYSTEM PROVIDED HISTORY: follow uo pleural effusion TECHNOLOGIST PROVIDED HISTORY: follow uo pleural effusion Reason for Exam: hx. of pleural effusion, follow up. FINDINGS: There are bibasilar infiltrates. There is no pneumothorax. The heart is prominent. The upper abdomen is unremarkable. The extrathoracic soft tissues are unremarkable. Bibasilar infiltrates representing atelectasis versus pneumonia. XR CHEST PORTABLE    Result Date: 4/18/2022  EXAMINATION: ONE XRAY VIEW OF THE CHEST 4/18/2022 12:30 pm COMPARISON: 04/16/2022 HISTORY: ORDERING SYSTEM PROVIDED HISTORY: Status post thoracentesis TECHNOLOGIST PROVIDED HISTORY: Status post thoracentesis Reason for Exam: Status post thoracentesis FINDINGS: Significantly improved right pleural effusion following thoracentesis. Stable small left pleural effusion. No pneumothorax. Cardiac size is enlarged. No other significant changes are seen     Significantly improved right pleural effusion following thoracentesis. Stable small left pleural effusion. XR CHEST PORTABLE    Result Date: 4/16/2022  EXAMINATION: ONE XRAY VIEW OF THE CHEST 4/16/2022 2:49 pm COMPARISON: March 14 2022 HISTORY: ORDERING SYSTEM PROVIDED HISTORY: hypotension, SOB TECHNOLOGIST PROVIDED HISTORY: hypotension, SOB Reason for Exam: hypotension, SOB FINDINGS: Marginal inspiration is noted.   A large right pleural effusion is noted, left basilar opacification, likely due to a combination of effusion, atelectasis and or consolidation. Cardiomegaly is noted. Vascular markings are distinct. No pneumothorax noted. No acute osseous abnormalities present. 1. Large right pleural effusion 2. Right basilar opacification, which may be due to a combination of atelectasis, effusion and or consolidation. CTA CHEST ABDOMEN PELVIS W CONTRAST    Result Date: 4/16/2022  EXAMINATION: CTA OF THE CHEST, ABDOMEN AND PELVIS WITH CONTRAST 4/16/2022 3:30 pm: TECHNIQUE: CTA of the chest, abdomen and pelvis was performed after the administration of intravenous contrast.  Multiplanar reformatted images are provided for review. MIP images are provided for review. Dose modulation, iterative reconstruction, and/or weight based adjustment of the mA/kV was utilized to reduce the radiation dose to as low as reasonably achievable. COMPARISON: Chest x-ray dated April 16, 2022 HISTORY: ORDERING SYSTEM PROVIDED HISTORY: hypotension, Chest pain, r/o disection TECHNOLOGIST PROVIDED HISTORY: hypotension, Chest pain, r/o disection Decision Support Exception - unselect if not a suspected or confirmed emergency medical condition->Emergency Medical Condition (MA) Reason for Exam: hypotension, Chest pain, r/o disection Relevant Medical/Surgical History: chf, stents, cardiac cath, appendectomy FINDINGS: CTA CHEST: Thoracic aorta: No evidence of thoracic aortic aneurysm or dissection. No acute abnormality of the aorta. Mediastinum: No adenopathy is present. Normal enhancement of the pulmonary arterial system is present. Extensive coronary arterial calcifications are noted. Small pericardial effusion is present. Fatty changes are present involving the apex of the left ventricle, related to prior infarct. Lungs/Pleura: A large right pleural effusion is present, with compressive atelectasis involving the right lower lobe. Superimposed infection is difficult to exclude.   The trachea and mainstem bronchi appear normal. Paraseptal emphysema is present within the right apex. Minimal patchy airspace disease is present within the left upper lobe, likely infectious in etiology. Soft Tissues/Bones: No acute osseous abnormality is present involving the thorax. Asymmetric enlargement of the right serratus anterior muscle is present, compared to the left. This may represent intramuscular hematoma or inflammation/myositis. CTA ABDOMEN: Abdominal aorta/Branches: Normal enhancement of the abdominal aorta and branch vessels is present without evidence of aneurysm formation or dissection. Organs: Hepatic steatosis is present. The gallbladder, pancreas, spleen, adrenal glands, and kidneys demonstrate no acute abnormality. GI/Bowel: Stomach appears normal.  Small bowel appears normal without evidence of obstruction. No evidence of appendicitis is present. Scattered colonic diverticula, without evidence of diverticulitis. Peritoneum/Retroperitoneum: No adenopathy is present. No free fluid is present within the abdomen. No free air is noted. Bones/Soft Tissues: No acute osseous abnormality is present involving the abdomen. Marked enlargement of the right rectus sheath and rectus abdominus muscle is present, consistent with a large rectus sheath hematoma. Hematoma extends superiorly, along the anterolateral right chest wall, likely involving the serrated anterior muscle is well. Right rectus sheath hematoma measures approximately 15-16 cm in transverse dimension, 4-5 cm in AP dimension, and approximately 16 cm in cephalocaudal dimension. No evidence of active bleeding is present. Soft tissue stranding is present throughout the subcutaneous fat of the right flank and anterior abdominal wall. CTA PELVIS: Aorta/Iliacs: Normal enhancement of the aortic bifurcation, common iliac, internal and external iliac arteries common femoral arteries is noted bilaterally without evidence of aneurysm formation or dissection.  Other: Urinary bladder is nondistended. Prostate gland is normal size. No free fluid or free air is present within the pelvis. Bones/Soft Tissues: No acute osseous abnormality is present involving the pelvis. A small umbilical hernia is present containing fat. 1. No evidence of dissection, aneurysm formation, or intramural hematoma formation involving the thoracic or abdominal aorta 2. Large right pleural effusion, with atelectasis involving the right lower lobe. Superimposed infection is difficult to exclude 3. Large right rectus sheath hematoma, measuring approximately 15-16 cm in transverse dimension, 4-5 cm in AP dimension, and approximately 16 cm in cephalocaudal dimension 4. Asymmetric enlargement of the right serratus anterior muscle alyce may related to intramuscular hematoma formation, or myositis. 5.  Critical results were called by Dr. Renee Anderson to Dr. Boris Briceño on 4/16/2022 at 7:11 p.m. hours. US THORACENTESIS Which side should the procedure be performed? Right    Result Date: 4/18/2022  PROCEDURE: ULTRASOUND CHEST FOR THORACENTESIS 4/18/2022 HISTORY: ORDERING SYSTEM PROVIDED HISTORY: Pleural effusion TECHNOLOGIST PROVIDED HISTORY: Pleural effusion Which side should the procedure be performed?->Right FINDINGS: Ultrasound right chest shows a small pleural effusion. Fluid was localized for subsequent thoracentesis performed by Dr. Frederick Lewis. Please correlate with procedure report for additional details. Small right pleural effusion localized for thoracentesis. Consultations:    Consults:     Final Specialist Recommendations/Findings:   IP CONSULT TO INTERNAL MEDICINE  IP CONSULT TO CRITICAL CARE  IP CONSULT TO GENERAL SURGERY  IP CONSULT TO CARDIOLOGY      The patient was seen and examined on day of discharge and this discharge summary is in conjunction with any daily progress note from day of discharge.     Discharge plan:       Disposition: Home    Physician Follow Up: known as: Nitrostat  Place 1 tablet under the tongue every 5 minutes as needed for Chest pain up to max of 3 total doses. If no relief after 1 dose, call 911. STOP taking these medications    aspirin 81 MG chewable tablet     clopidogrel 75 MG tablet  Commonly known as: PLAVIX     docusate sodium 100 MG capsule  Commonly known as: COLACE           Where to Get Your Medications      These medications were sent to [facility]  [address]  [address], [address] [facility] [address]    Phone: [phone]   · benzonatate 200 MG capsule  · dextromethorphan 30 MG/5ML extended release liquid  · ferrous sulfate 325 (65 Fe) MG tablet  · oxyCODONE-acetaminophen 5-325 MG per tablet           Electronically signed by   Claude Olmedo MD  4/21/2022  3:19 PM      Thank you Dr. Lis Serrano MD for the opportunity to be involved in this patient's care. Attending Physician Statement  I have discussed the care of [address] and I have examined the patient myselft and taken ros and hpi , including pertinent history and exam findings,  with the resident. I have reviewed the key elements of all parts of the encounter with the resident. I agree with the DC plan as documented by the resident.       Electronically signed by Jairo Delacruz MD

## 2022-04-22 ENCOUNTER — TELEPHONE (OUTPATIENT)
Dept: PRIMARY CARE CLINIC | Age: 62
End: 2022-04-22

## 2022-04-22 NOTE — TELEPHONE ENCOUNTER
[facility] Transitions Initial Follow Up Call    Outreach made within 2 business days of discharge: Yes    Patient: Kenan Schaffer Patient : 1960   MRN: [id number]  Reason for Admission: There are no discharge diagnoses documented for the most recent discharge. Discharge Date: 22       Spoke with: MANASA TO CALL OFFICE BACK TO SCHEDULE APPT     Discharge department/facility: [facility]     Unable to reach patient- 1st attempt - left voicemail to call office back     Follow Up  No future appointments.     Vik Books, MA

## 2022-04-24 ENCOUNTER — TELEPHONE (OUTPATIENT)
Dept: PRIMARY CARE CLINIC | Age: 62
End: 2022-04-24

## 2022-04-24 NOTE — TELEPHONE ENCOUNTER
Was in the hospital. Increased bruising ad swelling today  Reviewed hospital d/c and tests and labs  Bruising is spreading from abdomen and right side down to the right buttock, hip and thigh. The hip and thigh don't feel bruised. Swelling in ankles and feet x 1D, Patient states there is not denting with finger pressure, his wife states there is. He states he isn't SOB today but his wife states he was. take extra dose lasix today  Elevate feet when sitting and when laying prop feet up for 20 minutes. Make f/u appt this week or next.  He has appt with his cardiologist

## 2022-04-25 ENCOUNTER — TELEPHONE (OUTPATIENT)
Dept: PRIMARY CARE CLINIC | Age: 62
End: 2022-04-25

## 2022-04-25 LAB
CULTURE: ABNORMAL
DIRECT EXAM: ABNORMAL
DIRECT EXAM: ABNORMAL
SPECIMEN DESCRIPTION: ABNORMAL

## 2022-04-25 NOTE — TELEPHONE ENCOUNTER
[facility] Transitions Initial Follow Up Call    Outreach made within 2 business days of discharge: Yes    Patient: Stefan Amaya Patient : 1960   MRN: [id number]  Reason for Admission: There are no discharge diagnoses documented for the most recent discharge. Discharge Date: 22       Spoke with: NO ANSWER/BUSY     Discharge department/facility: [address] [place] [address]     PHONE WAS BUSY, UNABLE TO LEAVE MESSAGE     Follow Up  No future appointments.     Nadia Molina MA

## 2022-05-03 ENCOUNTER — HOSPITAL ENCOUNTER (OUTPATIENT)
Age: 62
Setting detail: SPECIMEN
Discharge: HOME | End: 2022-05-03

## 2022-05-03 LAB
ANION GAP SERPL CALCULATED.3IONS-SCNC: 12 MMOL/L (ref 9–17)
BUN BLDV-MCNC: 18 MG/DL (ref 8–23)
CALCIUM SERPL-MCNC: 9 MG/DL (ref 8.6–10.4)
CHLORIDE BLD-SCNC: 102 MMOL/L (ref 98–107)
CO2: 24 MMOL/L (ref 20–31)
CREAT SERPL-MCNC: 0.84 MG/DL (ref 0.7–1.2)
GFR AFRICAN AMERICAN: >60 ML/MIN
GFR NON-AFRICAN AMERICAN: >60 ML/MIN
GFR SERPL CREATININE-BSD FRML MDRD: NORMAL ML/MIN/{1.73_M2}
GLUCOSE BLD-MCNC: 70 MG/DL (ref 70–99)
HCT VFR BLD CALC: 35.2 % (ref 40.7–50.3)
HEMOGLOBIN: 10.6 G/DL (ref 13–17)
MAGNESIUM: 2.4 MG/DL (ref 1.6–2.6)
MCH RBC QN AUTO: 27.3 PG (ref 25.2–33.5)
MCHC RBC AUTO-ENTMCNC: 30.1 G/DL (ref 28.4–34.8)
MCV RBC AUTO: 90.7 FL (ref 82.6–102.9)
NRBC AUTOMATED: 0 PER 100 WBC
PDW BLD-RTO: 14.5 % (ref 11.8–14.4)
PLATELET # BLD: 471 K/UL (ref 138–453)
PMV BLD AUTO: 9.1 FL (ref 8.1–13.5)
POTASSIUM SERPL-SCNC: 4.9 MMOL/L (ref 3.7–5.3)
RBC # BLD: 3.88 M/UL (ref 4.21–5.77)
SODIUM BLD-SCNC: 138 MMOL/L (ref 135–144)
WBC # BLD: 9.2 K/UL (ref 3.5–11.3)

## 2022-09-21 ENCOUNTER — OFFICE VISIT (OUTPATIENT)
Dept: PRIMARY CARE CLINIC | Age: 62
End: 2022-09-21
Payer: COMMERCIAL

## 2022-09-21 VITALS
BODY MASS INDEX: 35.49 KG/M2 | HEART RATE: 96 BPM | DIASTOLIC BLOOD PRESSURE: 80 MMHG | HEIGHT: 73 IN | OXYGEN SATURATION: 96 % | WEIGHT: 267.8 LBS | SYSTOLIC BLOOD PRESSURE: 118 MMHG

## 2022-09-21 DIAGNOSIS — M25.511 CHRONIC RIGHT SHOULDER PAIN: Primary | ICD-10-CM

## 2022-09-21 DIAGNOSIS — G89.29 CHRONIC RIGHT SHOULDER PAIN: Primary | ICD-10-CM

## 2022-09-21 PROCEDURE — 96372 THER/PROPH/DIAG INJ SC/IM: CPT | Performed by: PHYSICIAN ASSISTANT

## 2022-09-21 PROCEDURE — 99213 OFFICE O/P EST LOW 20 MIN: CPT | Performed by: PHYSICIAN ASSISTANT

## 2022-09-21 RX ORDER — TICAGRELOR 90 MG/1
TABLET ORAL
COMMUNITY
Start: 2022-08-05

## 2022-09-21 RX ORDER — METHYLPREDNISOLONE ACETATE 40 MG/ML
40 INJECTION, SUSPENSION INTRA-ARTICULAR; INTRALESIONAL; INTRAMUSCULAR; SOFT TISSUE ONCE
Status: COMPLETED | OUTPATIENT
Start: 2022-09-21 | End: 2022-09-21

## 2022-09-21 RX ORDER — CYCLOBENZAPRINE HCL 10 MG
10 TABLET ORAL NIGHTLY
Qty: 10 TABLET | Refills: 0 | Status: SHIPPED | OUTPATIENT
Start: 2022-09-21 | End: 2022-10-01

## 2022-09-21 RX ADMIN — METHYLPREDNISOLONE ACETATE 40 MG: 40 INJECTION, SUSPENSION INTRA-ARTICULAR; INTRALESIONAL; INTRAMUSCULAR; SOFT TISSUE at 15:01

## 2022-09-21 NOTE — PROGRESS NOTES
[address] [address]  Dept: [phone]    León Roca is a 57 y.o. male Established patient, who presents today for his medical conditions/complaints as noted below. Chief Complaint   Patient presents with    Arm Pain     Patient is here today c/o right arm pain, worse since yesterday. Patient states ongoing pain for months. HPI:     HPI: The patient has been having upper right arm pain for months. Would ache when sitting still and be better with movement. No injury. Also having some spasm into neck. Helps when rubbing it. The patient stopped statin.      Reviewed prior notes None  Reviewed previous Labs    LDL Cholesterol (mg/dL)   Date Value   09/13/2021 90   04/20/2021 86       (goal LDL is <100)   AST (U/L)   Date Value   04/16/2022 11     ALT (U/L)   Date Value   04/16/2022 17     BUN (mg/dL)   Date Value   05/03/2022 18     Hemoglobin A1C (%)   Date Value   09/13/2021 5.6     TSH (mIU/L)   Date Value   04/19/2021 1.35     BP Readings from Last 3 Encounters:   09/21/22 118/80   04/21/22 130/84   04/15/22 138/86          (goal 120/80)  Lab Results   Component Value Date    LABA1C 5.6 09/13/2021     Lab Results   Component Value Date     09/13/2021     Past Medical History:   Diagnosis Date    CAD (coronary artery disease)     Cerebral artery occlusion with cerebral infarction ([address] 04/2021    TIA    CHF (congestive heart failure) ([facility])     Hx of heart artery stent     heart stents x 6    Hyperlipidemia     Hypertension     MI (myocardial infarction) ([address]     MI at age 35      Past Surgical History:   Procedure Laterality Date    APPENDECTOMY      CARDIAC CATHETERIZATION  09/13/2021    LORAINE MID RCA LORAINE DISTAL RCA    CARDIAC CATHETERIZATION      CARDIAC CATHETERIZATION      heart stents x 6    CARDIAC SURGERY      4 stents placed in 2002    THORACENTESIS  4/18/2022         TONSILLECTOMY      as a child Family History   Problem Relation Age of Onset    Stroke Mother     Heart Disease Mother     Heart Disease Father     Diabetes Father     Stroke Father        Social History     Tobacco Use    Smoking status: Former     Packs/day: 2.00     Years: 15.00     Pack years: 30.00     Types: Cigarettes     Start date:      Quit date:      Years since quittin.7    Smokeless tobacco: Never   Substance Use Topics    Alcohol use: Yes     Comment: OCCAISIONALLY      Current Outpatient Medications   Medication Sig Dispense Refill    BRILINTA 90 MG TABS tablet TAKE 1 TABLET BY MOUTH TWICE A DAY      cyclobenzaprine (FLEXERIL) 10 MG tablet Take 1 tablet by mouth at bedtime for 10 days 10 tablet 0    furosemide (LASIX) 20 MG tablet take 1 tablet by mouth once daily      metoprolol tartrate (LOPRESSOR) 25 MG tablet Take 1 tablet by mouth 2 times daily 60 tablet 5    losartan (COZAAR) 50 MG tablet Take 50 mg by mouth daily      atorvastatin (LIPITOR) 80 MG tablet Take 1 tablet by mouth nightly (Patient not taking: No sig reported) 30 tablet 3     Current Facility-Administered Medications   Medication Dose Route Frequency Provider Last Rate Last Admin    methylPREDNISolone acetate (DEPO-MEDROL) injection 40 mg  40 mg IntraMUSCular Once Ariel Schilder, PA         No Known Allergies    Health Maintenance   Topic Date Due    HIV screen  Never done    Hepatitis C screen  Never done    DTaP/Tdap/Td vaccine (1 - Tdap) 2003    Shingles vaccine (1 of 2) Never done    COVID-19 Vaccine (3 - Booster for Pfizer series) 2021    Flu vaccine (1) 2022    Lipids  2022    Depression Screen  04/15/2023    Colorectal Cancer Screen  2025    Hepatitis A vaccine  Aged Out    Hepatitis B vaccine  Aged Out    Hib vaccine  Aged Out    Meningococcal (ACWY) vaccine  Aged Out    Pneumococcal 0-64 years Vaccine  Aged Out       Subjective:      Review of Systems   Constitutional:  Negative for chills and fever. Musculoskeletal:  Positive for arthralgias, neck pain and neck stiffness. Negative for joint swelling. Skin:  Negative for rash. Neurological:  Negative for dizziness, weakness and numbness. Objective:     /80   Pulse 96   Ht 6' 1\" (1.854 m)   Wt 267 lb 12.8 oz (121.5 kg)   SpO2 96%   BMI 35.33 kg/m²   Physical Exam  Constitutional:       Appearance: Normal appearance. HENT:      Head: Normocephalic and atraumatic. Right Ear: External ear normal.      Left Ear: External ear normal.   Cardiovascular:      Rate and Rhythm: Normal rate and regular rhythm. Pulses: Normal pulses. Heart sounds: Normal heart sounds. Pulmonary:      Effort: Pulmonary effort is normal.      Breath sounds: Normal breath sounds. Musculoskeletal:         General: Tenderness (paraspinal muscle spasm on right side of neck.) present. No swelling. Normal range of motion. Neurological:      Mental Status: He is alert. Assessment and Plan:          1. Chronic right shoulder pain  -     XR SHOULDER RIGHT (MIN 2 VIEWS); Future  -     methylPREDNISolone acetate (DEPO-MEDROL) injection 40 mg; 40 mg, IntraMUSCular, ONCE, 1 dose, On Wed 9/21/22 at 1500  -     cyclobenzaprine (FLEXERIL) 10 MG tablet; Take 1 tablet by mouth at bedtime for 10 days, Disp-10 tablet, R-0Normal           Return for Follow up if symptoms persist or worsen. Patient given educational materials - see patient instructions. Discussed use, benefit, and side effects of prescribed medications. All patient questions answered. Pt voiced understanding. Reviewed health maintenance. Instructed to continue current medications, diet and exercise. Patient agreed with treatment plan. Follow up as directed.      Electronically signed by LUIS MANUEL Workman on 9/21/2022 at 2:43 PM

## 2024-02-05 ENCOUNTER — TELEPHONE (OUTPATIENT)
Dept: PRIMARY CARE CLINIC | Age: 64
End: 2024-02-05

## 2024-02-05 DIAGNOSIS — R47.01 EXPRESSIVE APHASIA: ICD-10-CM

## 2024-02-05 DIAGNOSIS — I25.10 CAD IN NATIVE ARTERY: Primary | ICD-10-CM

## 2024-02-05 DIAGNOSIS — I10 PRIMARY HYPERTENSION: ICD-10-CM

## 2024-02-05 DIAGNOSIS — I63.81 CEREBROVASCULAR ACCIDENT (CVA) DUE TO OCCLUSION OF SMALL ARTERY (HCC): ICD-10-CM

## 2024-02-05 NOTE — TELEPHONE ENCOUNTER
He should see first available provider  If he is not taking any blood thinners like brillinta then he should be taking baby aspirin daily   I don't see recent labs: will put in orders: fasting

## 2024-02-05 NOTE — TELEPHONE ENCOUNTER
Last thurs pt was on the ph with his son and for about 20 to 30 seconds he was not able to get his thoughts together . This happened to him about 2 yrs ago and he had a stroke. Pt did not go to er. Pt ended up going and doing a  ( pt is a ) and was fine. States that he has been perfectly fine since last thurs. Pt did have a headache on fri and pretty much just layed around. Not seeing cardiology until the end of March. Asking is you want to see him, have him see another provider, or what do you recommend?     [phone]

## 2024-02-05 NOTE — TELEPHONE ENCOUNTER
Scheduled pt an appt. Pt does take a baby asa daily. Pt will get his BW done tomorrow am. Fasting instructions given.

## 2024-02-06 DIAGNOSIS — R47.01 EXPRESSIVE APHASIA: ICD-10-CM

## 2024-02-06 DIAGNOSIS — I25.10 CAD IN NATIVE ARTERY: ICD-10-CM

## 2024-02-06 DIAGNOSIS — I10 PRIMARY HYPERTENSION: ICD-10-CM

## 2024-02-06 DIAGNOSIS — I63.81 CEREBROVASCULAR ACCIDENT (CVA) DUE TO OCCLUSION OF SMALL ARTERY (HCC): ICD-10-CM

## 2024-02-06 LAB
ABSOLUTE IMMATURE GRANULOCYTE: 0.04 K/UL (ref 0–0.3)
ALBUMIN SERPL-MCNC: 4.5 G/DL (ref 3.5–5.2)
ALBUMIN/GLOBULIN RATIO: 1.8 (ref 1–2.5)
ALP BLD-CCNC: 48 U/L (ref 40–129)
ALT SERPL-CCNC: 25 U/L (ref 5–41)
ANION GAP SERPL CALCULATED.3IONS-SCNC: 9 MMOL/L (ref 9–17)
AST SERPL-CCNC: 15 U/L
BASOPHILS ABSOLUTE: 0.06 K/UL (ref 0–0.2)
BASOPHILS RELATIVE PERCENT: 1 % (ref 0–2)
BILIRUB SERPL-MCNC: 0.5 MG/DL (ref 0.3–1.2)
BUN BLDV-MCNC: 17 MG/DL (ref 8–23)
CALCIUM SERPL-MCNC: 8.7 MG/DL (ref 8.6–10.4)
CHLORIDE BLD-SCNC: 105 MMOL/L (ref 98–107)
CHOLESTEROL/HDL RATIO: 5.2
CHOLESTEROL: 173 MG/DL
CO2: 27 MMOL/L (ref 20–31)
CREAT SERPL-MCNC: 0.9 MG/DL (ref 0.7–1.2)
EOSINOPHILS ABSOLUTE: 0.17 K/UL (ref 0–0.44)
EOSINOPHILS RELATIVE PERCENT: 2 % (ref 1–4)
GFR SERPL CREATININE-BSD FRML MDRD: >60 ML/MIN/1.73M2
GLUCOSE FASTING: 109 MG/DL (ref 70–99)
HCT VFR BLD CALC: 50 % (ref 40.7–50.3)
HDLC SERPL-MCNC: 33 MG/DL
HEMOGLOBIN: 16.3 G/DL (ref 13–17)
IMMATURE GRANULOCYTES: 1 %
LDL CHOLESTEROL: 107 MG/DL (ref 0–130)
LYMPHOCYTES ABSOLUTE: 2.92 K/UL (ref 1.1–3.7)
LYMPHOCYTES RELATIVE PERCENT: 42 % (ref 24–43)
MCH RBC QN AUTO: 27.8 PG (ref 25.2–33.5)
MCHC RBC AUTO-ENTMCNC: 32.6 G/DL (ref 28.4–34.8)
MCV RBC AUTO: 85.3 FL (ref 82.6–102.9)
MONOCYTES ABSOLUTE: 0.54 K/UL (ref 0.1–1.2)
MONOCYTES RELATIVE PERCENT: 8 % (ref 3–12)
NEUTROPHILS ABSOLUTE: 3.24 K/UL (ref 1.5–8.1)
NEUTROPHILS RELATIVE PERCENT: 46 % (ref 36–65)
NRBC AUTOMATED: 0 PER 100 WBC
PDW BLD-RTO: 13 % (ref 11.8–14.4)
PLATELET # BLD: 215 K/UL (ref 138–453)
PMV BLD AUTO: 9.9 FL (ref 8.1–13.5)
POTASSIUM SERPL-SCNC: 4.7 MMOL/L (ref 3.7–5.3)
RBC # BLD: 5.86 M/UL (ref 4.21–5.77)
SODIUM BLD-SCNC: 141 MMOL/L (ref 135–144)
TOTAL PROTEIN: 7 G/DL (ref 6.4–8.3)
TRIGL SERPL-MCNC: 166 MG/DL
WBC # BLD: 7 K/UL (ref 3.5–11.3)

## 2024-02-07 NOTE — RESULT ENCOUNTER NOTE
LDL is not at goal of 80 or less. He should be on a statin medicine.   Sugar a little above normal so are triglycerides, decrease sugars  He sees Dr Santo this week and can review further

## 2024-02-08 ENCOUNTER — OFFICE VISIT (OUTPATIENT)
Dept: PRIMARY CARE CLINIC | Age: 64
End: 2024-02-08
Payer: COMMERCIAL

## 2024-02-08 VITALS
OXYGEN SATURATION: 98 % | HEART RATE: 85 BPM | BODY MASS INDEX: 37.64 KG/M2 | SYSTOLIC BLOOD PRESSURE: 130 MMHG | WEIGHT: 284 LBS | DIASTOLIC BLOOD PRESSURE: 78 MMHG | TEMPERATURE: 97.6 F | HEIGHT: 73 IN

## 2024-02-08 DIAGNOSIS — I25.10 CAD IN NATIVE ARTERY: Primary | ICD-10-CM

## 2024-02-08 DIAGNOSIS — Z86.73 HISTORY OF TIA (TRANSIENT ISCHEMIC ATTACK): ICD-10-CM

## 2024-02-08 DIAGNOSIS — I10 PRIMARY HYPERTENSION: ICD-10-CM

## 2024-02-08 PROCEDURE — 1123F ACP DISCUSS/DSCN MKR DOCD: CPT | Performed by: STUDENT IN AN ORGANIZED HEALTH CARE EDUCATION/TRAINING PROGRAM

## 2024-02-08 PROCEDURE — 99214 OFFICE O/P EST MOD 30 MIN: CPT | Performed by: STUDENT IN AN ORGANIZED HEALTH CARE EDUCATION/TRAINING PROGRAM

## 2024-02-08 PROCEDURE — 3078F DIAST BP <80 MM HG: CPT | Performed by: STUDENT IN AN ORGANIZED HEALTH CARE EDUCATION/TRAINING PROGRAM

## 2024-02-08 PROCEDURE — 3075F SYST BP GE 130 - 139MM HG: CPT | Performed by: STUDENT IN AN ORGANIZED HEALTH CARE EDUCATION/TRAINING PROGRAM

## 2024-02-08 RX ORDER — SIMVASTATIN 20 MG
20 TABLET ORAL NIGHTLY
Qty: 90 TABLET | Refills: 1 | Status: SHIPPED | OUTPATIENT
Start: 2024-02-08

## 2024-02-08 RX ORDER — ASPIRIN 81 MG/1
81 TABLET ORAL DAILY
COMMUNITY

## 2024-02-08 SDOH — ECONOMIC STABILITY: FOOD INSECURITY: WITHIN THE PAST 12 MONTHS, THE FOOD YOU BOUGHT JUST DIDN'T LAST AND YOU DIDN'T HAVE MONEY TO GET MORE.: NEVER TRUE

## 2024-02-08 SDOH — ECONOMIC STABILITY: INCOME INSECURITY: HOW HARD IS IT FOR YOU TO PAY FOR THE VERY BASICS LIKE FOOD, HOUSING, MEDICAL CARE, AND HEATING?: NOT VERY HARD

## 2024-02-08 SDOH — ECONOMIC STABILITY: FOOD INSECURITY: WITHIN THE PAST 12 MONTHS, YOU WORRIED THAT YOUR FOOD WOULD RUN OUT BEFORE YOU GOT MONEY TO BUY MORE.: NEVER TRUE

## 2024-02-08 SDOH — ECONOMIC STABILITY: HOUSING INSECURITY
IN THE LAST 12 MONTHS, WAS THERE A TIME WHEN YOU DID NOT HAVE A STEADY PLACE TO SLEEP OR SLEPT IN A SHELTER (INCLUDING NOW)?: NO

## 2024-02-08 ASSESSMENT — PATIENT HEALTH QUESTIONNAIRE - PHQ9
1. LITTLE INTEREST OR PLEASURE IN DOING THINGS: 0
SUM OF ALL RESPONSES TO PHQ QUESTIONS 1-9: 0
SUM OF ALL RESPONSES TO PHQ QUESTIONS 1-9: 0
2. FEELING DOWN, DEPRESSED OR HOPELESS: 0
SUM OF ALL RESPONSES TO PHQ QUESTIONS 1-9: 0
SUM OF ALL RESPONSES TO PHQ QUESTIONS 1-9: 0
SUM OF ALL RESPONSES TO PHQ9 QUESTIONS 1 & 2: 0

## 2024-02-08 ASSESSMENT — ENCOUNTER SYMPTOMS
ABDOMINAL PAIN: 0
NAUSEA: 0
VOMITING: 0
SHORTNESS OF BREATH: 0

## 2024-02-08 NOTE — PROGRESS NOTES
[facility]  [facility]  [address] [facility] [address]  Dept: [phone]    Ronald Preston is a 63 y.o. male established patient, who presents acutely for his complaints as noted below:    Chief Complaint   Patient presents with    Stroke     Patient states that he is having signs of stroke as he has had one in the past. Forgetfulness and pausing in between thoughts.        HPI:     Patient was having difficulty with words for estimated 20-30 seconds while on the phone with son. Discussed this with his PCP, has hx of a small stroke. Takes daily baby aspirin. Lab work was ordered, which he completed. He was advised to be on a statin medication. Today, he reports that he was previously on atorvastatin 80 mg daily but had reactions to it, felt flush, waking up with spots in front of his eyes.    Reviewed prior notes from PCP.  Reviewed previous labs.    LDL Cholesterol (mg/dL)   Date Value   02/06/2024 107   09/13/2021 90   04/20/2021 86       (goal LDL is <100)   AST (U/L)   Date Value   02/06/2024 15     ALT (U/L)   Date Value   02/06/2024 25     BUN (mg/dL)   Date Value   02/06/2024 17     Hemoglobin A1C (%)   Date Value   09/13/2021 5.6     TSH (mIU/L)   Date Value   04/19/2021 1.35     BP Readings from Last 3 Encounters:   02/08/24 130/78   09/21/22 118/80   04/21/22 130/84          (goal 120/80)    Past Medical History:   Diagnosis Date    CAD (coronary artery disease)     Cerebral artery occlusion with cerebral infarction ([facility]) 04/2021    TIA    CHF (congestive heart failure) ([facility])     Hx of heart artery stent     heart stents x 6    Hyperlipidemia     Hypertension     MI (myocardial infarction) ([facility])     MI at age 41      Past Surgical History:   Procedure Laterality Date    APPENDECTOMY      CARDIAC CATHETERIZATION  09/13/2021    LORAINE MID RCA LORAINE DISTAL RCA    CARDIAC CATHETERIZATION      CARDIAC CATHETERIZATION      heart stents x 6    CARDIAC SURGERY      4

## 2024-03-15 ENCOUNTER — OFFICE VISIT (OUTPATIENT)
Dept: PRIMARY CARE CLINIC | Age: 64
End: 2024-03-15
Payer: COMMERCIAL

## 2024-03-15 VITALS
SYSTOLIC BLOOD PRESSURE: 130 MMHG | BODY MASS INDEX: 38.51 KG/M2 | WEIGHT: 290.6 LBS | HEIGHT: 73 IN | DIASTOLIC BLOOD PRESSURE: 90 MMHG | HEART RATE: 83 BPM | OXYGEN SATURATION: 94 %

## 2024-03-15 DIAGNOSIS — I10 PRIMARY HYPERTENSION: Primary | ICD-10-CM

## 2024-03-15 DIAGNOSIS — I63.81 CEREBROVASCULAR ACCIDENT (CVA) DUE TO OCCLUSION OF SMALL ARTERY (HCC): ICD-10-CM

## 2024-03-15 DIAGNOSIS — E66.01 SEVERE OBESITY (BMI 35.0-39.9) WITH COMORBIDITY (HCC): ICD-10-CM

## 2024-03-15 PROBLEM — J96.01 ACUTE RESPIRATORY FAILURE WITH HYPOXIA (HCC): Status: RESOLVED | Noted: 2022-04-19 | Resolved: 2024-03-15

## 2024-03-15 PROCEDURE — 3077F SYST BP >= 140 MM HG: CPT | Performed by: FAMILY MEDICINE

## 2024-03-15 PROCEDURE — 99214 OFFICE O/P EST MOD 30 MIN: CPT | Performed by: FAMILY MEDICINE

## 2024-03-15 PROCEDURE — 1123F ACP DISCUSS/DSCN MKR DOCD: CPT | Performed by: FAMILY MEDICINE

## 2024-03-15 PROCEDURE — 3080F DIAST BP >= 90 MM HG: CPT | Performed by: FAMILY MEDICINE

## 2024-03-15 NOTE — PATIENT INSTRUCTIONS

## 2024-03-15 NOTE — PROGRESS NOTES
SpO2 94%   BMI 38.35 kg/m²   Physical Exam  Vitals and nursing note reviewed.   Constitutional:       General: He is not in acute distress.     Appearance: He is well-developed. He is obese. He is not ill-appearing.   HENT:      Head: Normocephalic and atraumatic.      Right Ear: External ear normal.      Left Ear: External ear normal.   Eyes:      General: No scleral icterus.        Right eye: No discharge.         Left eye: No discharge.      Conjunctiva/sclera: Conjunctivae normal.   Neck:      Thyroid: No thyromegaly.      Trachea: No tracheal deviation.   Cardiovascular:      Rate and Rhythm: Normal rate and regular rhythm.      Heart sounds: Normal heart sounds.   Pulmonary:      Effort: Pulmonary effort is normal. No respiratory distress.      Breath sounds: Normal breath sounds. No wheezing.   Musculoskeletal:      Right lower leg: No edema.      Left lower leg: No edema.   Lymphadenopathy:      Cervical: No cervical adenopathy.   Skin:     General: Skin is warm.      Findings: No rash.   Neurological:      Mental Status: He is alert and oriented to person, place, and time.   Psychiatric:         Mood and Affect: Mood normal.         Behavior: Behavior normal.         Thought Content: Thought content normal.         Assessment:       Diagnosis Orders   1. Primary hypertension  Glucose, Fasting      2. Severe obesity (BMI 35.0-39.9) with comorbidity (HCC)        3. Cerebrovascular accident (CVA) due to occlusion of small artery (HCC)  Glucose, Fasting    MRI BRAIN WO CONTRAST           Plan:      Return in about 3 months (around 6/15/2024) for hypertension.  He needs to have the blood pressure down another 5-15 points.  Have an office visit either here or with cardiologist in 3 months  Orders Placed This Encounter   Procedures    MRI BRAIN WO CONTRAST     Standing Status:   Future     Standing Expiration Date:   3/15/2025    Glucose, Fasting     Standing Status:   Future     Standing Expiration Date:

## 2024-11-15 ENCOUNTER — HOSPITAL ENCOUNTER (OUTPATIENT)
Age: 64
Discharge: HOME | End: 2024-11-15
Payer: COMMERCIAL

## 2024-11-15 LAB
ALBUMIN SERPL-MCNC: 4.6 G/DL (ref 3.5–5.2)
ALP SERPL-CCNC: 56 U/L (ref 40–129)
ALT SERPL-CCNC: 32 U/L (ref 10–50)
ANION GAP SERPL CALCULATED.3IONS-SCNC: 10 MMOL/L (ref 9–16)
AST SERPL-CCNC: 21 U/L (ref 10–50)
BILIRUB SERPL-MCNC: 0.4 MG/DL (ref 0–1.2)
BUN SERPL-MCNC: 14 MG/DL (ref 8–23)
CALCIUM SERPL-MCNC: 8.8 MG/DL (ref 8.6–10.4)
CHLORIDE SERPL-SCNC: 103 MMOL/L (ref 98–107)
CO2 SERPL-SCNC: 25 MMOL/L (ref 20–31)
CREAT SERPL-MCNC: 0.9 MG/DL (ref 0.7–1.2)
CRP SERPL HS-MCNC: <3 MG/L (ref 0–5)
ERYTHROCYTE [DISTWIDTH] IN BLOOD BY AUTOMATED COUNT: 14.1 % (ref 11.5–14.9)
EST. AVERAGE GLUCOSE BLD GHB EST-MCNC: 108 MG/DL
GFR, ESTIMATED: >90 ML/MIN/1.73M2
GLUCOSE SERPL-MCNC: 119 MG/DL (ref 74–99)
HBA1C MFR BLD: 5.4 % (ref 4–6)
HCT VFR BLD AUTO: 50.2 % (ref 41–53)
HCYS SERPL-SCNC: 9.9 UMOL/L (ref 0–15)
HGB BLD-MCNC: 16.9 G/DL (ref 13.5–17.5)
INSULIN COMMENT: NORMAL
INSULIN REFERENCE RANGE:: NORMAL
INSULIN: 45.5 MU/L
MCH RBC QN AUTO: 28.8 PG (ref 26–34)
MCHC RBC AUTO-ENTMCNC: 33.6 G/DL (ref 31–37)
MCV RBC AUTO: 85.6 FL (ref 80–100)
PLATELET # BLD AUTO: 234 K/UL (ref 150–450)
PMV BLD AUTO: 7.6 FL (ref 6–12)
POTASSIUM SERPL-SCNC: 4.3 MMOL/L (ref 3.7–5.3)
PROT SERPL-MCNC: 7.1 G/DL (ref 6.6–8.7)
PSA SERPL-MCNC: 1.8 NG/ML (ref 0–4)
RBC # BLD AUTO: 5.87 M/UL (ref 4.5–5.9)
SHBG SERPL-SCNC: 22 NMOL/L (ref 19–76)
SODIUM SERPL-SCNC: 138 MMOL/L (ref 136–145)
T3FREE SERPL-MCNC: 3.4 PG/ML (ref 2–4.4)
T4 FREE SERPL-MCNC: 1.2 NG/DL (ref 0.9–1.7)
TESTOST FREE MFR SERPL: 35.6 PG/ML (ref 47–244)
TESTOST SERPL-MCNC: 153 NG/DL (ref 193–740)
TSH SERPL DL<=0.05 MIU/L-ACNC: 1.53 UIU/ML (ref 0.27–4.2)
WBC OTHER # BLD: 6.6 K/UL (ref 3.5–11)

## 2024-11-15 PROCEDURE — 84439 ASSAY OF FREE THYROXINE: CPT

## 2024-11-15 PROCEDURE — 80053 COMPREHEN METABOLIC PANEL: CPT

## 2024-11-15 PROCEDURE — 83525 ASSAY OF INSULIN: CPT

## 2024-11-15 PROCEDURE — 83090 ASSAY OF HOMOCYSTEINE: CPT

## 2024-11-15 PROCEDURE — 84443 ASSAY THYROID STIM HORMONE: CPT

## 2024-11-15 PROCEDURE — 84403 ASSAY OF TOTAL TESTOSTERONE: CPT

## 2024-11-15 PROCEDURE — 84442 ASSAY OF THYROID ACTIVITY: CPT

## 2024-11-15 PROCEDURE — 36415 COLL VENOUS BLD VENIPUNCTURE: CPT

## 2024-11-15 PROCEDURE — 84270 ASSAY OF SEX HORMONE GLOBUL: CPT

## 2024-11-15 PROCEDURE — 86376 MICROSOMAL ANTIBODY EACH: CPT

## 2024-11-15 PROCEDURE — G0103 PSA SCREENING: HCPCS

## 2024-11-15 PROCEDURE — 84481 FREE ASSAY (FT-3): CPT

## 2024-11-15 PROCEDURE — 85027 COMPLETE CBC AUTOMATED: CPT

## 2024-11-15 PROCEDURE — 82626 DEHYDROEPIANDROSTERONE: CPT

## 2024-11-15 PROCEDURE — 82652 VIT D 1 25-DIHYDROXY: CPT

## 2024-11-15 PROCEDURE — 86140 C-REACTIVE PROTEIN: CPT

## 2024-11-15 PROCEDURE — 83036 HEMOGLOBIN GLYCOSYLATED A1C: CPT

## 2024-11-17 LAB
1,25(OH)2D3 SERPL-MCNC: 72.8 PG/ML (ref 19.9–79.3)
T4BG SERPL-MCNC: 15.5 UG/ML (ref 13–30)
THYROPEROXIDASE AB SERPL IA-ACNC: 9 IU/ML (ref 0–25)

## 2024-11-19 LAB — DHEA: 2.02 NG/ML (ref 0.63–4.7)
